# Patient Record
Sex: MALE | Race: OTHER | NOT HISPANIC OR LATINO | URBAN - METROPOLITAN AREA
[De-identification: names, ages, dates, MRNs, and addresses within clinical notes are randomized per-mention and may not be internally consistent; named-entity substitution may affect disease eponyms.]

---

## 2018-09-04 ENCOUNTER — INPATIENT (INPATIENT)
Facility: HOSPITAL | Age: 18
LOS: 2 days | Discharge: HOME | DRG: 872 | End: 2018-09-07
Attending: INTERNAL MEDICINE | Admitting: INTERNAL MEDICINE
Payer: COMMERCIAL

## 2018-09-04 VITALS
HEIGHT: 66 IN | SYSTOLIC BLOOD PRESSURE: 121 MMHG | TEMPERATURE: 103 F | HEART RATE: 152 BPM | OXYGEN SATURATION: 96 % | RESPIRATION RATE: 20 BRPM | DIASTOLIC BLOOD PRESSURE: 80 MMHG | WEIGHT: 199.96 LBS

## 2018-09-04 DIAGNOSIS — Z29.9 ENCOUNTER FOR PROPHYLACTIC MEASURES, UNSPECIFIED: ICD-10-CM

## 2018-09-04 DIAGNOSIS — A41.9 SEPSIS, UNSPECIFIED ORGANISM: ICD-10-CM

## 2018-09-04 DIAGNOSIS — K52.9 NONINFECTIVE GASTROENTERITIS AND COLITIS, UNSPECIFIED: ICD-10-CM

## 2018-09-04 LAB
24R-OH-CALCIDIOL SERPL-MCNC: 23.3 NG/ML — LOW (ref 30–80)
ALBUMIN SERPL ELPH-MCNC: 4.2 G/DL — SIGNIFICANT CHANGE UP (ref 3.5–5)
ALP SERPL-CCNC: 112 U/L — SIGNIFICANT CHANGE UP (ref 40–120)
ALT FLD-CCNC: 50 U/L DA — SIGNIFICANT CHANGE UP (ref 10–60)
ANION GAP SERPL CALC-SCNC: 7 MMOL/L — SIGNIFICANT CHANGE UP (ref 5–17)
ANION GAP SERPL CALC-SCNC: 9 MMOL/L — SIGNIFICANT CHANGE UP (ref 5–17)
APPEARANCE UR: CLEAR — SIGNIFICANT CHANGE UP
APTT BLD: 30 SEC — SIGNIFICANT CHANGE UP (ref 27.5–37.4)
AST SERPL-CCNC: 25 U/L — SIGNIFICANT CHANGE UP (ref 10–40)
BAKER'S YEAST IGA QN IA: 13.9 UNITS — SIGNIFICANT CHANGE UP
BAKER'S YEAST IGA QN IA: NEGATIVE — SIGNIFICANT CHANGE UP
BAKER'S YEAST IGG QN IA: 13.7 UNITS — SIGNIFICANT CHANGE UP
BAKER'S YEAST IGG QN IA: NEGATIVE — SIGNIFICANT CHANGE UP
BASOPHILS # BLD AUTO: 0 K/UL — SIGNIFICANT CHANGE UP (ref 0–0.2)
BASOPHILS # BLD AUTO: 0.1 K/UL — SIGNIFICANT CHANGE UP (ref 0–0.2)
BASOPHILS NFR BLD AUTO: 0.2 % — SIGNIFICANT CHANGE UP (ref 0–2)
BASOPHILS NFR BLD AUTO: 0.5 % — SIGNIFICANT CHANGE UP (ref 0–2)
BILIRUB SERPL-MCNC: 0.4 MG/DL — SIGNIFICANT CHANGE UP (ref 0.2–1.2)
BILIRUB UR-MCNC: NEGATIVE — SIGNIFICANT CHANGE UP
BUN SERPL-MCNC: 13 MG/DL — SIGNIFICANT CHANGE UP (ref 7–18)
BUN SERPL-MCNC: 14 MG/DL — SIGNIFICANT CHANGE UP (ref 7–18)
CALCIUM SERPL-MCNC: 8.5 MG/DL — SIGNIFICANT CHANGE UP (ref 8.4–10.5)
CALCIUM SERPL-MCNC: 8.9 MG/DL — SIGNIFICANT CHANGE UP (ref 8.4–10.5)
CHLORIDE SERPL-SCNC: 105 MMOL/L — SIGNIFICANT CHANGE UP (ref 96–108)
CHLORIDE SERPL-SCNC: 108 MMOL/L — SIGNIFICANT CHANGE UP (ref 96–108)
CHOLEST SERPL-MCNC: 159 MG/DL — SIGNIFICANT CHANGE UP (ref 10–199)
CO2 SERPL-SCNC: 25 MMOL/L — SIGNIFICANT CHANGE UP (ref 22–31)
CO2 SERPL-SCNC: 25 MMOL/L — SIGNIFICANT CHANGE UP (ref 22–31)
COLOR SPEC: YELLOW — SIGNIFICANT CHANGE UP
CREAT SERPL-MCNC: 1.1 MG/DL — SIGNIFICANT CHANGE UP (ref 0.5–1.3)
CREAT SERPL-MCNC: 1.11 MG/DL — SIGNIFICANT CHANGE UP (ref 0.5–1.3)
CRP SERPL-MCNC: 13.47 MG/DL — HIGH (ref 0–0.4)
DIFF PNL FLD: ABNORMAL
EOSINOPHIL # BLD AUTO: 0 K/UL — SIGNIFICANT CHANGE UP (ref 0–0.5)
EOSINOPHIL # BLD AUTO: 0 K/UL — SIGNIFICANT CHANGE UP (ref 0–0.5)
EOSINOPHIL NFR BLD AUTO: 0 % — SIGNIFICANT CHANGE UP (ref 0–6)
EOSINOPHIL NFR BLD AUTO: 0.1 % — SIGNIFICANT CHANGE UP (ref 0–6)
ERYTHROCYTE [SEDIMENTATION RATE] IN BLOOD: 13 MM/HR — SIGNIFICANT CHANGE UP (ref 0–15)
GLUCOSE SERPL-MCNC: 104 MG/DL — HIGH (ref 70–99)
GLUCOSE SERPL-MCNC: 99 MG/DL — SIGNIFICANT CHANGE UP (ref 70–99)
GLUCOSE UR QL: NEGATIVE — SIGNIFICANT CHANGE UP
HBA1C BLD-MCNC: 5.4 % — SIGNIFICANT CHANGE UP (ref 4–5.6)
HCT VFR BLD CALC: 42.4 % — SIGNIFICANT CHANGE UP (ref 39–50)
HCT VFR BLD CALC: 47.6 % — SIGNIFICANT CHANGE UP (ref 39–50)
HDLC SERPL-MCNC: 45 MG/DL — SIGNIFICANT CHANGE UP
HGB BLD-MCNC: 13.6 G/DL — SIGNIFICANT CHANGE UP (ref 13–17)
HGB BLD-MCNC: 15.2 G/DL — SIGNIFICANT CHANGE UP (ref 13–17)
INR BLD: 1.12 RATIO — SIGNIFICANT CHANGE UP (ref 0.88–1.16)
KETONES UR-MCNC: NEGATIVE — SIGNIFICANT CHANGE UP
LACTATE SERPL-SCNC: 1.1 MMOL/L — SIGNIFICANT CHANGE UP (ref 0.7–2)
LEUKOCYTE ESTERASE UR-ACNC: NEGATIVE — SIGNIFICANT CHANGE UP
LIPID PNL WITH DIRECT LDL SERPL: 106 MG/DL — SIGNIFICANT CHANGE UP
LYMPHOCYTES # BLD AUTO: 0.8 K/UL — LOW (ref 1–3.3)
LYMPHOCYTES # BLD AUTO: 1.1 K/UL — SIGNIFICANT CHANGE UP (ref 1–3.3)
LYMPHOCYTES # BLD AUTO: 4.9 % — LOW (ref 13–44)
LYMPHOCYTES # BLD AUTO: 6.4 % — LOW (ref 13–44)
MAGNESIUM SERPL-MCNC: 1.7 MG/DL — SIGNIFICANT CHANGE UP (ref 1.6–2.6)
MCHC RBC-ENTMCNC: 27.2 PG — SIGNIFICANT CHANGE UP (ref 27–34)
MCHC RBC-ENTMCNC: 27.6 PG — SIGNIFICANT CHANGE UP (ref 27–34)
MCHC RBC-ENTMCNC: 31.9 GM/DL — LOW (ref 32–36)
MCHC RBC-ENTMCNC: 32.2 GM/DL — SIGNIFICANT CHANGE UP (ref 32–36)
MCV RBC AUTO: 85.3 FL — SIGNIFICANT CHANGE UP (ref 80–100)
MCV RBC AUTO: 85.8 FL — SIGNIFICANT CHANGE UP (ref 80–100)
MONOCYTES # BLD AUTO: 0.8 K/UL — SIGNIFICANT CHANGE UP (ref 0–0.9)
MONOCYTES # BLD AUTO: 0.9 K/UL — SIGNIFICANT CHANGE UP (ref 0–0.9)
MONOCYTES NFR BLD AUTO: 4.5 % — SIGNIFICANT CHANGE UP (ref 2–14)
MONOCYTES NFR BLD AUTO: 5.2 % — SIGNIFICANT CHANGE UP (ref 2–14)
NEUTROPHILS # BLD AUTO: 14.8 K/UL — HIGH (ref 1.8–7.4)
NEUTROPHILS # BLD AUTO: 15.2 K/UL — HIGH (ref 1.8–7.4)
NEUTROPHILS NFR BLD AUTO: 87.8 % — HIGH (ref 43–77)
NEUTROPHILS NFR BLD AUTO: 90.3 % — HIGH (ref 43–77)
NITRITE UR-MCNC: NEGATIVE — SIGNIFICANT CHANGE UP
PH UR: 5 — SIGNIFICANT CHANGE UP (ref 5–8)
PHOSPHATE SERPL-MCNC: 2.3 MG/DL — LOW (ref 2.5–4.5)
PLATELET # BLD AUTO: 295 K/UL — SIGNIFICANT CHANGE UP (ref 150–400)
PLATELET # BLD AUTO: 347 K/UL — SIGNIFICANT CHANGE UP (ref 150–400)
POTASSIUM SERPL-MCNC: 3.5 MMOL/L — SIGNIFICANT CHANGE UP (ref 3.5–5.3)
POTASSIUM SERPL-MCNC: 3.7 MMOL/L — SIGNIFICANT CHANGE UP (ref 3.5–5.3)
POTASSIUM SERPL-SCNC: 3.5 MMOL/L — SIGNIFICANT CHANGE UP (ref 3.5–5.3)
POTASSIUM SERPL-SCNC: 3.7 MMOL/L — SIGNIFICANT CHANGE UP (ref 3.5–5.3)
PROT SERPL-MCNC: 9.1 G/DL — HIGH (ref 6–8.3)
PROT UR-MCNC: 30 MG/DL
PROTHROM AB SERPL-ACNC: 12.2 SEC — SIGNIFICANT CHANGE UP (ref 9.8–12.7)
RBC # BLD: 4.94 M/UL — SIGNIFICANT CHANGE UP (ref 4.2–5.8)
RBC # BLD: 5.58 M/UL — SIGNIFICANT CHANGE UP (ref 4.2–5.8)
RBC # FLD: 13.4 % — SIGNIFICANT CHANGE UP (ref 10.3–14.5)
RBC # FLD: 13.7 % — SIGNIFICANT CHANGE UP (ref 10.3–14.5)
SODIUM SERPL-SCNC: 139 MMOL/L — SIGNIFICANT CHANGE UP (ref 135–145)
SODIUM SERPL-SCNC: 140 MMOL/L — SIGNIFICANT CHANGE UP (ref 135–145)
SP GR SPEC: 1.01 — SIGNIFICANT CHANGE UP (ref 1.01–1.02)
TOTAL CHOLESTEROL/HDL RATIO MEASUREMENT: 3.5 RATIO — SIGNIFICANT CHANGE UP (ref 3.4–9.6)
TRIGL SERPL-MCNC: 42 MG/DL — SIGNIFICANT CHANGE UP (ref 10–149)
TSH SERPL-MCNC: 0.28 UU/ML — LOW (ref 0.34–4.82)
UROBILINOGEN FLD QL: NEGATIVE — SIGNIFICANT CHANGE UP
VIT B12 SERPL-MCNC: 643 PG/ML — SIGNIFICANT CHANGE UP (ref 232–1245)
WBC # BLD: 16.9 K/UL — HIGH (ref 3.8–10.5)
WBC # BLD: 16.9 K/UL — HIGH (ref 3.8–10.5)
WBC # FLD AUTO: 16.9 K/UL — HIGH (ref 3.8–10.5)
WBC # FLD AUTO: 16.9 K/UL — HIGH (ref 3.8–10.5)

## 2018-09-04 PROCEDURE — 71046 X-RAY EXAM CHEST 2 VIEWS: CPT | Mod: 26

## 2018-09-04 PROCEDURE — 74177 CT ABD & PELVIS W/CONTRAST: CPT | Mod: 26

## 2018-09-04 PROCEDURE — 99285 EMERGENCY DEPT VISIT HI MDM: CPT | Mod: 25

## 2018-09-04 RX ORDER — ACETAMINOPHEN 500 MG
1000 TABLET ORAL ONCE
Qty: 0 | Refills: 0 | Status: COMPLETED | OUTPATIENT
Start: 2018-09-04 | End: 2018-09-04

## 2018-09-04 RX ORDER — CIPROFLOXACIN LACTATE 400MG/40ML
400 VIAL (ML) INTRAVENOUS EVERY 12 HOURS
Qty: 0 | Refills: 0 | Status: DISCONTINUED | OUTPATIENT
Start: 2018-09-04 | End: 2018-09-07

## 2018-09-04 RX ORDER — METRONIDAZOLE 500 MG
500 TABLET ORAL EVERY 8 HOURS
Qty: 0 | Refills: 0 | Status: DISCONTINUED | OUTPATIENT
Start: 2018-09-04 | End: 2018-09-07

## 2018-09-04 RX ORDER — ACETAMINOPHEN 500 MG
650 TABLET ORAL EVERY 6 HOURS
Qty: 0 | Refills: 0 | Status: DISCONTINUED | OUTPATIENT
Start: 2018-09-04 | End: 2018-09-07

## 2018-09-04 RX ORDER — ONDANSETRON 8 MG/1
4 TABLET, FILM COATED ORAL ONCE
Qty: 0 | Refills: 0 | Status: COMPLETED | OUTPATIENT
Start: 2018-09-04 | End: 2018-09-04

## 2018-09-04 RX ORDER — SODIUM,POTASSIUM PHOSPHATES 278-250MG
1 POWDER IN PACKET (EA) ORAL
Qty: 0 | Refills: 0 | Status: COMPLETED | OUTPATIENT
Start: 2018-09-04 | End: 2018-09-04

## 2018-09-04 RX ORDER — SODIUM CHLORIDE 9 MG/ML
1000 INJECTION, SOLUTION INTRAVENOUS
Qty: 0 | Refills: 0 | Status: COMPLETED | OUTPATIENT
Start: 2018-09-04 | End: 2018-09-05

## 2018-09-04 RX ORDER — KETOROLAC TROMETHAMINE 30 MG/ML
15 SYRINGE (ML) INJECTION EVERY 6 HOURS
Qty: 0 | Refills: 0 | Status: DISCONTINUED | OUTPATIENT
Start: 2018-09-04 | End: 2018-09-07

## 2018-09-04 RX ORDER — SODIUM CHLORIDE 9 MG/ML
2700 INJECTION INTRAMUSCULAR; INTRAVENOUS; SUBCUTANEOUS ONCE
Qty: 0 | Refills: 0 | Status: COMPLETED | OUTPATIENT
Start: 2018-09-04 | End: 2018-09-04

## 2018-09-04 RX ORDER — TRAMADOL HYDROCHLORIDE 50 MG/1
25 TABLET ORAL EVERY 6 HOURS
Qty: 0 | Refills: 0 | Status: DISCONTINUED | OUTPATIENT
Start: 2018-09-04 | End: 2018-09-07

## 2018-09-04 RX ORDER — ONDANSETRON 8 MG/1
4 TABLET, FILM COATED ORAL EVERY 6 HOURS
Qty: 0 | Refills: 0 | Status: DISCONTINUED | OUTPATIENT
Start: 2018-09-04 | End: 2018-09-07

## 2018-09-04 RX ORDER — KETOROLAC TROMETHAMINE 30 MG/ML
30 SYRINGE (ML) INJECTION ONCE
Qty: 0 | Refills: 0 | Status: DISCONTINUED | OUTPATIENT
Start: 2018-09-04 | End: 2018-09-04

## 2018-09-04 RX ORDER — PIPERACILLIN AND TAZOBACTAM 4; .5 G/20ML; G/20ML
3.38 INJECTION, POWDER, LYOPHILIZED, FOR SOLUTION INTRAVENOUS ONCE
Qty: 0 | Refills: 0 | Status: COMPLETED | OUTPATIENT
Start: 2018-09-04 | End: 2018-09-04

## 2018-09-04 RX ADMIN — SODIUM CHLORIDE 100 MILLILITER(S): 9 INJECTION, SOLUTION INTRAVENOUS at 13:00

## 2018-09-04 RX ADMIN — Medication 100 MILLIGRAM(S): at 13:00

## 2018-09-04 RX ADMIN — Medication 100 MILLIGRAM(S): at 06:37

## 2018-09-04 RX ADMIN — Medication 100 MILLIGRAM(S): at 21:32

## 2018-09-04 RX ADMIN — Medication 1 TABLET(S): at 18:19

## 2018-09-04 RX ADMIN — SODIUM CHLORIDE 2700 MILLILITER(S): 9 INJECTION INTRAMUSCULAR; INTRAVENOUS; SUBCUTANEOUS at 03:09

## 2018-09-04 RX ADMIN — Medication 400 MILLIGRAM(S): at 03:09

## 2018-09-04 RX ADMIN — SODIUM CHLORIDE 100 MILLILITER(S): 9 INJECTION, SOLUTION INTRAVENOUS at 18:21

## 2018-09-04 RX ADMIN — Medication 1000 MILLIGRAM(S): at 04:00

## 2018-09-04 RX ADMIN — Medication 30 MILLIGRAM(S): at 03:10

## 2018-09-04 RX ADMIN — Medication 200 MILLIGRAM(S): at 18:19

## 2018-09-04 RX ADMIN — PIPERACILLIN AND TAZOBACTAM 200 GRAM(S): 4; .5 INJECTION, POWDER, LYOPHILIZED, FOR SOLUTION INTRAVENOUS at 03:14

## 2018-09-04 RX ADMIN — ONDANSETRON 4 MILLIGRAM(S): 8 TABLET, FILM COATED ORAL at 03:10

## 2018-09-04 RX ADMIN — Medication 650 MILLIGRAM(S): at 18:21

## 2018-09-04 RX ADMIN — PIPERACILLIN AND TAZOBACTAM 3.38 GRAM(S): 4; .5 INJECTION, POWDER, LYOPHILIZED, FOR SOLUTION INTRAVENOUS at 04:00

## 2018-09-04 RX ADMIN — Medication 200 MILLIGRAM(S): at 06:37

## 2018-09-04 RX ADMIN — SODIUM CHLORIDE 2700 MILLILITER(S): 9 INJECTION INTRAMUSCULAR; INTRAVENOUS; SUBCUTANEOUS at 04:22

## 2018-09-04 RX ADMIN — Medication 650 MILLIGRAM(S): at 13:00

## 2018-09-04 RX ADMIN — Medication 30 MILLIGRAM(S): at 04:22

## 2018-09-04 RX ADMIN — Medication 1 TABLET(S): at 13:00

## 2018-09-04 RX ADMIN — SODIUM CHLORIDE 100 MILLILITER(S): 9 INJECTION, SOLUTION INTRAVENOUS at 21:32

## 2018-09-04 NOTE — H&P ADULT - PROBLEM SELECTOR PLAN 1
Patient comes in with fever with abdominal pain   High grade fever and tachycardia in Ed  Code Sepsis was called in Ed, patient was given one dose of Zosyn , 2700ml fluid , after which temp and Hr normalized  ; Lactate wnl   Leucocytosis noted on labs  will start with Cipro and Flagyl for now   NPO  IVF   Pain mx   f/u blood cultures   Monitor for fever , signs of hemodynamic stability Patient comes in with fever with abdominal pain   High grade fever and tachycardia in Ed  Code Sepsis was called in Ed, patient was given one dose of Zosyn , 2700ml fluid , after which temp and Hr normalized  ; Lactate wnl   Leucocytosis noted on labs  will start with Cipro and Flagyl for now   NPO  IVF   Pain mx   f/u blood cultures   Monitor for fever , signs of hemodynamic stability  Could be bacterial vs viral   however with h/o Ulcerative Colitis, would send Calprotectin, asca , anca Patient comes in with fever with abdominal pain   High grade fever and tachycardia in Ed  Code Sepsis was called in Ed, patient was given one dose of Zosyn , 2700ml fluid , after which temp and Hr normalized  ; Lactate wnl   Leucocytosis noted on labs  will start with Cipro and Flagyl for now   NPO  IVF   Pain mx   Zofran prn for nausea   f/u blood cultures   Monitor for fever , signs of hemodynamic stability  Could be bacterial vs viral   Stool ova and parasites   however with h/o Ulcerative Colitis, would send Calprotectin, asca , anca , esr , crp

## 2018-09-04 NOTE — ED PROVIDER NOTE - PROGRESS NOTE DETAILS
labs show wbc 17K, cmp unremarkable, awaiting UA  CT shows ascending/transverse colon colitis  discussed above with patient and family, pt stable for admission

## 2018-09-04 NOTE — H&P ADULT - FAMILY HISTORY
No pertinent family history in first degree relatives Sibling  Still living? Unknown  Family history of ulcerative colitis, Age at diagnosis: Age Unknown

## 2018-09-04 NOTE — ED PROVIDER NOTE - MUSCULOSKELETAL, MLM
Spine appears normal, range of motion is not limited, mild tenderness over right upper quadrant and epigastrium.

## 2018-09-04 NOTE — H&P ADULT - ATTENDING COMMENTS
Seen and examined  with sister in room . I feel much better so want to eat . Agree with above A/P . Ct abdomen noted. IV abxs,IVF and will start clears.

## 2018-09-04 NOTE — H&P ADULT - NSHPPHYSICALEXAM_GEN_ALL_CORE
Constitutional : Well-developed  Eyes : EOMI; PERRL; no drainage or redness ; dry mucous membranes   Neck: No bruits; no thyromegaly or nodules  Breasts : No masses; no nipple discharge  Back : No deformity or limitation of movement  Respiratory : Breath Sounds equal & clear to percussion & auscultation, no accessory muscle use  Cardiovascular :Regular rate & rhythm, normal S1, S2; no murmurs, gallops or rubs;  Gastrointestinal : Soft, tenderness + diffusely , no rebound tenderness or guarding , no hepatosplenomegaly, normal bowel sounds  Extremities : No cyanosis, clubbing or edema  Vascular	: Equal and normal pulses (carotid, femoral, dorsalis pedis)  Neurological : Alert & oriented; no sensory, motor or coordination deficits, normal reflexes  Skin : No lesions; no rash  Musculoskeletal : No joint pain, swelling or deformity; no limitation of movement  Psychiatric : Affect and characteristics of appearance, verbalizations, behaviors are appropriate Constitutional : obese   Eyes : EOMI; PERRL; no drainage or redness ; dry mucous membranes   Neck: No bruits; no thyromegaly or nodules  Breasts : No masses; no nipple discharge  Back : No deformity or limitation of movement  Respiratory : Breath Sounds equal & clear to percussion & auscultation, no accessory muscle use  Cardiovascular :Regular rate & rhythm, normal S1, S2; no murmurs, gallops or rubs;  Gastrointestinal : Soft, no tenderness , said had tenderness before , no rebound tenderness or guarding , no hepatosplenomegaly, normal bowel sounds  Extremities : No cyanosis, clubbing or edema  Vascular	: Equal and normal pulses (carotid, femoral, dorsalis pedis)  Neurological : Alert & oriented; no sensory, motor or coordination deficits, normal reflexes  Skin : No lesions; no rash  Musculoskeletal : No joint pain, swelling or deformity; no limitation of movement  Psychiatric : Affect and characteristics of appearance, verbalizations, behaviors are appropriate

## 2018-09-04 NOTE — ED PROVIDER NOTE - MEDICAL DECISION MAKING DETAILS
17 y/o M pt presenting with fever, abd pain, and diarrhea. Will obtain sepsis, labs, UA, CT abd, give IV fluids, IV abx, Tylenol and Toradol for fever. Will reassess. 17 y/o M pt presenting with fever, abd pain, and diarrhea. Will obtain sepsis, labs, UA, CT, give IV fluids, IV abx, Tylenol and Toradol for fever. Will reassess.

## 2018-09-04 NOTE — H&P ADULT - NSHPLABSRESULTS_GEN_ALL_CORE
15.2   16.9  )-----------( 347      ( 04 Sep 2018 03:10 )             47.6       09-04    139  |  105  |  14  ----------------------------<  104<H>  3.5   |  25  |  1.11    Ca    8.9      04 Sep 2018 03:10    TPro  9.1<H>  /  Alb  4.2  /  TBili  0.4  /  DBili  x   /  AST  25  /  ALT  50  /  AlkPhos  112  09-04      < from: CT Abdomen and Pelvis w/ IV Cont (09.04.18 @ 03:52) >    FINDINGS:    LOWER CHEST: Within normal limits.    LIVER: Steatosis.  BILE DUCTS: Normal caliber.  GALLBLADDER: Within normal limits.  SPLEEN: Within normal limits.  PANCREAS: Within normal limits.  ADRENALS: Within normal limits.  KIDNEYS/URETERS: Within normal limits.    BLADDER: Within normal limits.  REPRODUCTIVE ORGANS: The prostate is within normal limits.    BOWEL: Colonic bowel wall thickening involving the ascending and   transverse colon with associated mild pericolonic inflammatory change. No   bowel obstruction. Appendix normal.  PERITONEUM: No ascites. Few mildly prominent right lower quadrant lymph   nodes, likely reactive.   VESSELS:  Within normal limits.  RETROPERITONEUM: No lymphadenopathy.    ABDOMINAL WALL: Small fat-containing left inguinal hernia..  BONES: Within normal limits.    IMPRESSION:    Colitis of the ascending and transverse colon.    < end of copied text >

## 2018-09-04 NOTE — H&P ADULT - ASSESSMENT
Will admit to medicine for Sepsis secondary to Colitis. 17 yo male with no PMH or PSH ,comes in with abdominal pain and fever at home. Patient complaint of high grade fever at home , with some chills. Also experienced generalized abdominal pain , 4/10 in intensity , with no radiation to back. Patient also reported nausea and 4 episodes of NBNP vomiting. Last bm was yest night , formed stools , no diarrhea , no blood in stools. No constipation. Denies any sick contacts / hospitalizations. Denies any h/o similar episode.   Negative for all other ROS. Had outside food ( chinese ) , but on Saturday , nobody else ate same food. No recent travel.   Family history : sister has Ulcerative Colitis     In Ed , BP : 121/ 80 mm hg , Hr : 152 , Temp : 102.8 F  Code Sepsis was called in Ed, patient was given one dose of Zosyn , 2700ml fluid , after which temp and Hr normalized   cbc shows white count of 16 with left shift   bmp wnl , lactate wnl   CT A/P shows colitis of Ascending colon and Transverse Colon     Will admit to medicine for Sepsis secondary to Colitis.

## 2018-09-04 NOTE — ED ADULT TRIAGE NOTE - CHIEF COMPLAINT QUOTE
pt stated he has a fever that started yesterday with stomach pain with nausea and vomiting denies diarrhea

## 2018-09-04 NOTE — H&P ADULT - HISTORY OF PRESENT ILLNESS
17 yo male with no PMH or PSH ,comes in with abdominal pain and fever at home.         In Ed , BP : 121/ 80 mm hg , Hr : 152 , Temp : 102.8 F  Code Sepsis was called in Ed, patient was given one dose of Zosyn , 2700ml fluid , after which temp and Hr normalized   cbc shows white count of 16 with left shift   bmp wnl , lactate wnl   CT A/P shows colitis of Ascending colon and Transverse Colon 17 yo male with no PMH or PSH ,comes in with abdominal pain and fever at home. Patient complaint of high grade fever at home , with some chills. Also experienced generalized abdominal pain , 4/10 in intensity , with no radiation to back. Patient also reported nausea and 4 episodes of NBNP vomiting. Last bm was yest night , formed stools , no diarrhea , no blood in stools. No constipation. Denies any sick contacts / hospitalizations. Denies any h/o similar episode.   Negative for all other ROS. Had outside food ( chinese ) , but on Saturday , nobody else ate same food. No recent travel.   Family history : sister has Ulcerative Colitis     In Ed , BP : 121/ 80 mm hg , Hr : 152 , Temp : 102.8 F  Code Sepsis was called in Ed, patient was given one dose of Zosyn , 2700ml fluid , after which temp and Hr normalized   cbc shows white count of 16 with left shift   bmp wnl , lactate wnl   CT A/P shows colitis of Ascending colon and Transverse Colon

## 2018-09-04 NOTE — ED PROVIDER NOTE - OBJECTIVE STATEMENT
19 y/o M pt with no PMHx presents to the ED c/o fever and abd pain on the middle of his abd since yesterday. Patient reports he did not take his temperature. Patient states he took ibuprofen at 11pm and had 3 episodes of vomiting. Patient denies diarrhea or any other complaints. NKDA.

## 2018-09-04 NOTE — PATIENT PROFILE ADULT. - CAREGIVER
Patient Education     Bronchitis with Wheezing (Viral or Bacterial: Adult)    Bronchitis is an infection of the air passages. It often occurs during the common cold and is usually caused by a virus. Symptoms include cough with mucus (phlegm) and low-grade fever. This illness is contagious during the first few days and is spread through the air by coughing and sneezing, or by direct contact (touching the sick person and then touching your own eyes, nose, or mouth).  If there is a lot of inflammation, air flow is restricted. The air passages may also go into spasm, especially if you have asthma. This causes wheezing and difficulty breathing even in people who do not have asthma.  Bronchitis usually lasts 7 to 14 days. The wheezing should improve with treatment during the first week. An inhaler is often prescribed to relax the air passages and stop wheezing. Antibiotics will be prescribed if your doctor thinks there is also a secondary bacterial infection.  Home care  · If symptoms are severe, rest at home for the first 2 to 3 days. When you go back to your usual activities, don't let yourself get too tired.  · Do not smoke. Also avoid being exposed to secondhand smoke.  · You may use over-the-counter medicine to control fever or pain, unless another medicine was prescribed. Note: If you have chronic liver or kidney disease or have ever had a stomach ulcer or gastrointestinal bleeding, talk with your healthcare provider before using these medicines. Also talk to your provider if you are taking medicine to prevent blood clots.) Aspirin should never be given to anyone younger than 18 years of age who is ill with a viral infection or fever. It may cause severe liver or brain damage.  · Your appetite may be poor, so a light diet is fine. Avoid dehydration by drinking 6 to 8 glasses of fluids per day (such as water, soft drinks, sports drinks, juices, tea, or soup). Extra fluids will help loosen secretions in the nose  and lungs.  · Over-the-counter cough, cold, and sore-throat medicines will not shorten the length of the illness, but they may be helpful to reduce symptoms. (Note: Do not use decongestants if you have high blood pressure.)  · If you were given an inhaler, use it exactly as directed. If you need to use it more often than prescribed, your condition may be worsening. If this happens, contact your healthcare provider.  · If prescribed, finish all antibiotic medicine, even if you are feeling better after only a few days.  Follow-up care  Follow up with your healthcare provider, or as advised. If you had an X-ray or ECG (electrocardiogram), a specialist will review it. You will be notified of any new findings that may affect your care.  Note: If you are age 65 or older, or if you have a chronic lung disease or condition that affects your immune system, or you smoke, talk to your healthcare provider about having a pneumococcal vaccinations and a yearly influenza vaccination (flu shot).  When to seek medical advice   Call your healthcare provider right away if any of these occur:  · Fever of 100.4°F (38°C) or higher  · Coughing up increasing amounts of colored sputum  · Weakness, drowsiness, headache, facial pain, ear pain, or a stiff neck  Call 911, or get immediate medical care  Contact emergency services right away if any of these occur.  · Coughing up blood  · Worsening weakness, drowsiness, headache, or stiff neck  · Increased wheezing not helped with medication, shortness of breath, or pain with breathing  © 9146-2853 [employer]. [address] [place] [address], [place] [address]. All rights reserved. This information is not intended as a substitute for professional medical care. Always follow your healthcare professional's instructions.             FOR TREATMENT OF RESPIRATORY INFECTIONS (UPPER RESPIRATORY INFECTIONS OR COMMMON COLD, SINUSITIS, BRONCHITIS, ETC.:    1. To help thin out the mucous and dry it up  some, use guaifenesin, an expectorant, brand names are Mucinex or Robitussin.  It also is used to lessen a cough by making the phlegm thinner, but does not suppress it. The maximum dose is 1200 mg 2 times daily.  2.  To help suppress the cough, use dextromethorphan which comes by itself, for example a brand name is Delsym, or comes in combination with guaifenesin, for example, brand names are Mucinex DM or Robitussin-DM.  The maximum dose is 60 mg twice a day. Also, studies have shown that honey is clinically effective as a cough suppressant.  3. To help decrease congestion and swelling in the nose/sinus openings and loosen mucus in the nose/sinuses, studies have shown that using saline irrigation or a saline nasal spray, a brand name is Ayr Nasal Spray, is very helpful. I usually recommend to use 2 sprays each nostril at least 4 times daily, or as often as needed. Using the saline nasal spray also helps for prevention of nosebleeds if use regularly. In addition, at nighttime, I recommend using an ointment to each nostril, like vitamin A and D or Vaseline or even saline ointment applying to each nostril.  4. Also, to help decrease congestion and swelling in the nose/sinus openings, studies have shown that a steroid nasal spray is also effective. They are available by prescription or over-the-counter. A common one is fluticasone, brand name is Flonase, and usual dosing is 2 sprays to each nostril once daily.  5. To help with congestion/swelling alone in the nose/sinus openings and the eustachian tube which drains from the middle ear, use a nasal decongestant, oxymetazoline, brand names are Afrin or Neosynephrine, usually dosed at 2 sprays in each nostril 2 times daily.  You need to be careful to use it not longer than 4-5 days because of the risk of tolerance and developing chronic nasal congestion unless you continue to use it.   6. To help with congestion/swelling in the nose, sinus openings and chest, use  pseudoephedrine, a brand name is Sudafed, which is dosed based on the number of milligrams in the pill/liquid:  Immediate release: 60 mg every 4-6 hours; Extended release: 120 mg every 12 hours or 240 mg every 24 hours; maximum: 240 mg/24 hours.  7. For pain/fever, use acetaminophen, a brand name is Tylenol, maximum dosing is 1000 mg 4 times a day, or any of the over-the-counter NSAIDs for example ibuprofen,a brand name is Advil, usually comes in 200 mg pills and use a maximum of 12 pills per day  usually taking 4 pills 3 times daily or naproxen sodium, a brand name is Aleve, usually comes in 220 mg pills and use a maximum of 2 pills up to 2 times per day. Take the ibuprofen or naproxen sodium with food, or a large glass of juice or milk.  8. Antibiotics are generally not necessary early on in the course of a respiratory infection, but if symptoms worsen or a person is at increased risk of a bacterial infection secondary to other health problems, then antibiotics can be prescribed.  9. Drink plenty of fluids as this also helps to thin out the mucous.  10.  Use a cool mist vaporizer or humidifier at the bedside at night to increase humidity and help loosen mucus/phlegm and prevent dry throat irritation causing increased coughing.  11.  For wheezing or bronchospasm, you have been prescribed and an albuterol inhaler. I recommend that for the first 4 days take 1 or 2 puffs 4 times a day regularly and you may increase up to every 4 hours as needed if you wake up during the night and after those first 4 days, you may be able to use it less often and only use it when needed, depending on how severe the wheezing/bronchospasm/coughing is. If you take 2 puffs at a time, try to wait 2-3 minutes after the 1st puff before you take the 2nd puff.  After using an inhaler, rinse your mouth out with any liquid and then swallow and then drink more liquid to rinse any residual medication in your mouth and throat.   12. Follow up with  your primary care physician next week Thursday or Friday. If you get worse then go to the emergency room   Yes

## 2018-09-04 NOTE — ED PROVIDER NOTE - NS ED MD DISPO ADMITTING SERVICE
- Patient reported to have 3 episodes of whole-body shaking followed by unresponsiveness over the past 2 days lasting 20-25 seconds- 2 witnessed by his uncle and 1 that patient reports feeling himself  - pending video EEG  - neuro recs appreciated  - possible non psychogenic seizure - Patient reported to have 3 episodes of whole-body shaking followed by unresponsiveness prior to hospitalization  - pending video EEG  - neuro recs appreciated  - possible non psychogenic seizure [facility]

## 2018-09-05 DIAGNOSIS — K52.9 NONINFECTIVE GASTROENTERITIS AND COLITIS, UNSPECIFIED: ICD-10-CM

## 2018-09-05 LAB
ANION GAP SERPL CALC-SCNC: 10 MMOL/L — SIGNIFICANT CHANGE UP (ref 5–17)
AUTO DIFF PNL BLD: NEGATIVE — SIGNIFICANT CHANGE UP
BUN SERPL-MCNC: 9 MG/DL — SIGNIFICANT CHANGE UP (ref 7–18)
C-ANCA SER-ACNC: NEGATIVE — SIGNIFICANT CHANGE UP
CALCIUM SERPL-MCNC: 8.3 MG/DL — LOW (ref 8.4–10.5)
CHLORIDE SERPL-SCNC: 105 MMOL/L — SIGNIFICANT CHANGE UP (ref 96–108)
CO2 SERPL-SCNC: 23 MMOL/L — SIGNIFICANT CHANGE UP (ref 22–31)
CREAT SERPL-MCNC: 1.12 MG/DL — SIGNIFICANT CHANGE UP (ref 0.5–1.3)
CULTURE RESULTS: NO GROWTH — SIGNIFICANT CHANGE UP
GLUCOSE SERPL-MCNC: 103 MG/DL — HIGH (ref 70–99)
HCT VFR BLD CALC: 43.3 % — SIGNIFICANT CHANGE UP (ref 39–50)
HGB BLD-MCNC: 14.1 G/DL — SIGNIFICANT CHANGE UP (ref 13–17)
MCHC RBC-ENTMCNC: 28 PG — SIGNIFICANT CHANGE UP (ref 27–34)
MCHC RBC-ENTMCNC: 32.6 GM/DL — SIGNIFICANT CHANGE UP (ref 32–36)
MCV RBC AUTO: 86.1 FL — SIGNIFICANT CHANGE UP (ref 80–100)
P-ANCA SER-ACNC: NEGATIVE — SIGNIFICANT CHANGE UP
PHOSPHATE SERPL-MCNC: 2.6 MG/DL — SIGNIFICANT CHANGE UP (ref 2.5–4.5)
PLATELET # BLD AUTO: 260 K/UL — SIGNIFICANT CHANGE UP (ref 150–400)
POTASSIUM SERPL-MCNC: 3.7 MMOL/L — SIGNIFICANT CHANGE UP (ref 3.5–5.3)
POTASSIUM SERPL-SCNC: 3.7 MMOL/L — SIGNIFICANT CHANGE UP (ref 3.5–5.3)
RBC # BLD: 5.03 M/UL — SIGNIFICANT CHANGE UP (ref 4.2–5.8)
RBC # FLD: 13.3 % — SIGNIFICANT CHANGE UP (ref 10.3–14.5)
SODIUM SERPL-SCNC: 138 MMOL/L — SIGNIFICANT CHANGE UP (ref 135–145)
SPECIMEN SOURCE: SIGNIFICANT CHANGE UP
T3FREE SERPL-MCNC: 1.58 PG/ML — LOW (ref 1.8–4.6)
T4 FREE SERPL-MCNC: 1.1 NG/DL — SIGNIFICANT CHANGE UP (ref 0.9–1.8)
WBC # BLD: 15.7 K/UL — HIGH (ref 3.8–10.5)
WBC # FLD AUTO: 15.7 K/UL — HIGH (ref 3.8–10.5)

## 2018-09-05 PROCEDURE — 99254 IP/OBS CNSLTJ NEW/EST MOD 60: CPT

## 2018-09-05 RX ORDER — ERGOCALCIFEROL 1.25 MG/1
50000 CAPSULE ORAL
Qty: 0 | Refills: 0 | Status: DISCONTINUED | OUTPATIENT
Start: 2018-09-05 | End: 2018-09-07

## 2018-09-05 RX ORDER — ACETAMINOPHEN 500 MG
650 TABLET ORAL ONCE
Qty: 0 | Refills: 0 | Status: DISCONTINUED | OUTPATIENT
Start: 2018-09-05 | End: 2018-09-07

## 2018-09-05 RX ADMIN — Medication 650 MILLIGRAM(S): at 00:34

## 2018-09-05 RX ADMIN — SODIUM CHLORIDE 100 MILLILITER(S): 9 INJECTION, SOLUTION INTRAVENOUS at 06:24

## 2018-09-05 RX ADMIN — Medication 100 MILLIGRAM(S): at 14:50

## 2018-09-05 RX ADMIN — ERGOCALCIFEROL 50000 UNIT(S): 1.25 CAPSULE ORAL at 14:50

## 2018-09-05 RX ADMIN — Medication 200 MILLIGRAM(S): at 17:30

## 2018-09-05 RX ADMIN — Medication 100 MILLIGRAM(S): at 06:24

## 2018-09-05 RX ADMIN — Medication 200 MILLIGRAM(S): at 06:24

## 2018-09-05 RX ADMIN — Medication 100 MILLIGRAM(S): at 21:17

## 2018-09-05 NOTE — PROGRESS NOTE ADULT - SUBJECTIVE AND OBJECTIVE BOX
INTERVAL HPI/OVERNIGHT EVENTS: I feel better and had two loose BM today.   Vital Signs Last 24 Hrs  T(C): 37.3 (05 Sep 2018 08:14), Max: 39.3 (04 Sep 2018 23:55)  T(F): 99.1 (05 Sep 2018 08:14), Max: 102.8 (04 Sep 2018 23:55)  HR: 105 (05 Sep 2018 08:14) (105 - 131)  BP: 132/68 (05 Sep 2018 08:14) (109/54 - 132/68)  BP(mean): --  RR: 16 (05 Sep 2018 08:14) (16 - 18)  SpO2: 97% (05 Sep 2018 08:14) (97% - 99%)  I&O's Summary    MEDICATIONS  (STANDING):  ciprofloxacin   IVPB 400 milliGRAM(s) IV Intermittent every 12 hours  ergocalciferol 10033 Unit(s) Oral <User Schedule>  metroNIDAZOLE  IVPB 500 milliGRAM(s) IV Intermittent every 8 hours    MEDICATIONS  (PRN):  acetaminophen   Tablet 650 milliGRAM(s) Oral every 6 hours PRN For Temp greater than 38 C (100.4 F)  acetaminophen   Tablet .. 650 milliGRAM(s) Oral once PRN Temp greater or equal to 38.5C (101.3F)  acetaminophen   Tablet. 650 milliGRAM(s) Oral every 6 hours PRN Mild Pain (1 - 3)  ketorolac   Injectable 15 milliGRAM(s) IV Push every 6 hours PRN Severe Pain (7 - 10)  ondansetron Injectable 4 milliGRAM(s) IV Push every 6 hours PRN Nausea and/or Vomiting  traMADol 25 milliGRAM(s) Oral every 6 hours PRN Moderate Pain (4 - 6)    LABS:                        14.1   15.7  )-----------( 260      ( 05 Sep 2018 07:02 )             43.3     09-05    138  |  105  |  9   ----------------------------<  103<H>  3.7   |  23  |  1.12    Ca    8.3<L>      05 Sep 2018 07:02  Phos  2.6     09-05  Mg     1.7     09-04    TPro  9.1<H>  /  Alb  4.2  /  TBili  0.4  /  DBili  x   /  AST  25  /  ALT  50  /  AlkPhos  112  09-04    PT/INR - ( 04 Sep 2018 03:10 )   PT: 12.2 sec;   INR: 1.12 ratio         PTT - ( 04 Sep 2018 03:10 )  PTT:30.0 sec  Urinalysis Basic - ( 04 Sep 2018 05:45 )    Color: Yellow / Appearance: Clear / S.015 / pH: x  Gluc: x / Ketone: Negative  / Bili: Negative / Urobili: Negative   Blood: x / Protein: 30 mg/dL / Nitrite: Negative   Leuk Esterase: Negative / RBC: 2-5 /HPF / WBC 0-2 /HPF   Sq Epi: x / Non Sq Epi: Occasional /HPF / Bacteria: x      CAPILLARY BLOOD GLUCOSE            Urinalysis Basic - ( 04 Sep 2018 05:45 )    Color: Yellow / Appearance: Clear / S.015 / pH: x  Gluc: x / Ketone: Negative  / Bili: Negative / Urobili: Negative   Blood: x / Protein: 30 mg/dL / Nitrite: Negative   Leuk Esterase: Negative / RBC: 2-5 /HPF / WBC 0-2 /HPF   Sq Epi: x / Non Sq Epi: Occasional /HPF / Bacteria: x      REVIEW OF SYSTEMS:  CONSTITUTIONAL: No fever, weight loss, or fatigue  EYES: No eye pain, visual disturbances, or discharge  ENMT:  No difficulty hearing, tinnitus, vertigo; No sinus or throat pain  NECK: No pain or stiffness  RESPIRATORY: No cough, wheezing, chills or hemoptysis; No shortness of breath  CARDIOVASCULAR: No chest pain, palpitations, dizziness, or leg swelling  GASTROINTESTINAL: No abdominal or epigastric pain. No nausea, vomiting, or hematemesis; No diarrhea or constipation. No melena or hematochezia.  GENITOURINARY: No dysuria, frequency, hematuria, or incontinence  NEUROLOGICAL: No headaches, memory loss, loss of strength, numbness, or tremors      Consultant(s) Notes Reviewed:  [x ] YES  [ ] NO    PHYSICAL EXAM:  GENERAL: NAD, well-groomed, well-developed,not in any distress ,  HEAD:  Atraumatic, Normocephalic  EYES: EOMI, PERRLA, conjunctiva and sclera clear  ENMT: No tonsillar erythema, exudates, or enlargement; Moist mucous membranes, Good dentition, No lesions  NECK: Supple, No JVD, Normal thyroid  NERVOUS SYSTEM:  Alert & Oriented X3, No focal deficit   CHEST/LUNG: Good air entry bilateral with no  rales, rhonchi, wheezing, or rubs  HEART: Regular rate and rhythm; No murmurs, rubs, or gallops  ABDOMEN: Soft, Nontender, Nondistended; Bowel sounds present  EXTREMITIES:  2+ Peripheral Pulses, No clubbing, cyanosis, or edema  SKIN: No rashes or lesions    Care Discussed with Consultants/Other Providers [ x] YES  [ ] NO

## 2018-09-05 NOTE — PROGRESS NOTE ADULT - PROBLEM SELECTOR PLAN 2
IMPROVE VTE Individual Risk Assessment          RISK                                                          Points  [  ] Previous VTE                                                3  [  ] Thrombophilia                                             2  [  ] Lower limb paralysis                                   2        (unable to hold up >15 seconds)    [  ] Current Cancer                                             2         (within 6 months)  [  ] Immobilization > 24 hrs                              1  [  ] ICU/CCU stay > 24 hours                             1  [  ] Age > 60                                                         1    IMPROVE VTE Score: 0   no indication for dvt ppx

## 2018-09-05 NOTE — CONSULT NOTE ADULT - ASSESSMENT
18 year old male with acute colitis. Although this might be UC the acuity of his symptoms lean away from a chronic process like UC. He is responding well to antibiotics ASCA is negative. ANCA is pending. Stool cultures and Fecal calprotectin is pending.   Plan:  Advance to Low fiber diet   Follow up all stool cultures and Ova and parasite (please send three specimens).   Outpatient colonoscopy   Avoid opoid for pain   Continue antibiotics   DVT prophylaxis

## 2018-09-05 NOTE — PROGRESS NOTE ADULT - ASSESSMENT
19 yo male with no PMH or PSH ,comes in with abdominal pain and fever at home. Patient complaint of high grade fever at home , with some chills. Also experienced generalized abdominal pain , 4/10 in intensity , with no radiation to back. Patient also reported nausea and 4 episodes of NBNP vomiting. Last bm was yest night , formed stools , no diarrhea , no blood in stools. No constipation. Denies any sick contacts / hospitalizations. Denies any h/o similar episode.   Negative for all other ROS. Had outside food ( chinese ) , but on Saturday , nobody else ate same food. No recent travel.   Family history : sister has Ulcerative Colitis     In Ed , BP : 121/ 80 mm hg , Hr : 152 , Temp : 102.8 F  Code Sepsis was called in Ed, patient was given one dose of Zosyn , 2700ml fluid , after which temp and Hr normalized   cbc shows white count of 16 with left shift   bmp wnl , lactate wnl   CT A/P shows colitis of Ascending colon and Transverse Colon     Will admit to medicine for Sepsis secondary to Colitis.

## 2018-09-05 NOTE — PROGRESS NOTE ADULT - ASSESSMENT
19 yo male with no PMH or PSH ,comes in with abdominal pain and fever at home. Patient complaint of high grade fever at home , with some chills. Also experienced generalized abdominal pain , 4/10 in intensity , with no radiation to back. Patient also reported nausea and 4 episodes of NBNP vomiting. Last bm was yest night , formed stools , no diarrhea , no blood in stools. No constipation. Denies any sick contacts / hospitalizations. Denies any h/o similar episode.      Problem/Plan - 1:  ·  Problem: Sepsis.  Plan: Present on admission. Improving. IV Abxs and S/P IVF.      Problem/Plan - 2:  ·  Problem: Acute colitis .  Plan: On Abxs. GI consult noted . Work up in progress .      Problem/Plan - 2:  ·  Problem: Prophylactic measure.  Plan: IMPROVE VTE Individual Risk Assessment; Ambulating .

## 2018-09-05 NOTE — CONSULT NOTE ADULT - SUBJECTIVE AND OBJECTIVE BOX
Patient is a 18y old  Male who presents with a chief complaint of fever, abdominal pain (05 Sep 2018 07:43)        REVIEW OF SYSTEMS  Constitutional:   No fever, no fatigue, no pallor, no night sweats, no weight loss.  HEENT:   No eye pain, no vision changes, no icterus, no mouth ulcers.  Respiratory:   No shortness of breath, no cough, no respiratory distress.   Cardiovascular:   No chest pain, no palpitations.   Gastrointestinal: No abdominal pain, no nausea, no vomiting , no diahrrea, no constipation, no hematochezia,no melena.  Skin:   No rashes, no jaundice, no eczema.   Musculoskeletal:   No joint pain, no swelling, no myalgia.   Neurologic:   No headache, no seizure, no weakness.   Genitourinary:   No dysuria, no decreased urine output.  Psychiatric:  No depression, no anxiety,   Endocrine:   No thyroid disease, no diabetes.  Heme/Lymphatic:   No anemia, no blood transfusions, no lymph node enlargement, no bleeding, no bruising.  ___________________________________________________________________________________________  Allergies    No Known Allergies    Intolerances      MEDICATIONS  (STANDING):  ciprofloxacin   IVPB 400 milliGRAM(s) IV Intermittent every 12 hours  ergocalciferol 88755 Unit(s) Oral <User Schedule>  metroNIDAZOLE  IVPB 500 milliGRAM(s) IV Intermittent every 8 hours    MEDICATIONS  (PRN):  acetaminophen   Tablet 650 milliGRAM(s) Oral every 6 hours PRN For Temp greater than 38 C (100.4 F)  acetaminophen   Tablet .. 650 milliGRAM(s) Oral once PRN Temp greater or equal to 38.5C (101.3F)  acetaminophen   Tablet. 650 milliGRAM(s) Oral every 6 hours PRN Mild Pain (1 - 3)  ketorolac   Injectable 15 milliGRAM(s) IV Push every 6 hours PRN Severe Pain (7 - 10)  ondansetron Injectable 4 milliGRAM(s) IV Push every 6 hours PRN Nausea and/or Vomiting  traMADol 25 milliGRAM(s) Oral every 6 hours PRN Moderate Pain (4 - 6)      PAST MEDICAL & SURGICAL HISTORY:  No pertinent past medical history  Hearing loss: Right-sided hearing loss at the age of 4. Prescribed a hearing aid.  No significant past surgical history    FAMILY HISTORY:  Family history of ulcerative colitis (Sibling)  Family history of hypercholesterolemia    Social History: No hsitory of : Tobacco use, IVDA, EToH  ______________________________________________________________________________________    PHYSICAL EXAM    Daily     Daily Weight in k.8 (04 Sep 2018 17:32)  BMI: 32.3 ( @ 02:34)  Change in Weight:  Vital Signs Last 24 Hrs  T(C): 37.3 (05 Sep 2018 08:14), Max: 39.3 (04 Sep 2018 23:55)  T(F): 99.1 (05 Sep 2018 08:14), Max: 102.8 (04 Sep 2018 23:55)  HR: 105 (05 Sep 2018 08:14) (105 - 131)  BP: 132/68 (05 Sep 2018 08:14) (109/54 - 132/68)  BP(mean): --  RR: 16 (05 Sep 2018 08:14) (16 - 18)  SpO2: 97% (05 Sep 2018 08:14) (97% - 99%)    General:  Well developed, well nourished, alert and active, no pallor, NAD.  HEENT:    Normal appearance of conjunctiva, ears, nose, lips, oropharynx, and oral mucosa, anicteric.  Neck:  No masses, no asymmetry.  Lymph Nodes:  No lymphadenopathy.   Cardiovascular:  RRR normal S1/S2, no murmur.  Respiratory:  CTA B/L, normal respiratory effort.   Abdominal:   soft, no masses or tenderness, normoactive BS, NT/ND, no HSM.  Extremities:   No clubbing or cyanosis, normal capillary refill, no edema.   Skin:   No rash, jaundice, lesions, eczema.   Musculoskeletal:  No joint swelling, erythema or tenderness.   Neuro: No focal deficits.   Other:   _______________________________________________________________________________________________  Lab Results:                          14.1   15.7  )-----------( 260      ( 05 Sep 2018 07:02 )             43.3         138  |  105  |  9   ----------------------------<  103<H>  3.7   |  23  |  1.12    Ca    8.3<L>      05 Sep 2018 07:02  Phos  2.6       Mg     1.7         TPro  9.1<H>  /  Alb  4.2  /  TBili  0.4  /  DBili  x   /  AST  25  /  ALT  50  /  AlkPhos  112  09-04    LIVER FUNCTIONS - ( 04 Sep 2018 03:10 )  Alb: 4.2 g/dL / Pro: 9.1 g/dL / ALK PHOS: 112 U/L / ALT: 50 U/L DA / AST: 25 U/L / GGT: x           PT/INR - ( 04 Sep 2018 03:10 )   PT: 12.2 sec;   INR: 1.12 ratio         PTT - ( 04 Sep 2018 03:10 )  PTT:30.0 sec  C-Reactive Protein, Serum: 13.47 mg/dL ( @ 17:57)        Stool Results:          RADIOLOGY RESULTS:    EXAM:  CT ABDOMEN AND PELVIS IC                            PROCEDURE DATE:  2018          INTERPRETATION:  CLINICAL INFORMATION: Right upper quadrant abdominal   pain.     COMPARISON: None available.    PROCEDURE:   CT of the Abdomen and Pelvis was performed with intravenous contrast.   Intravenous contrast: 90 ml Omnipaque 350. 10 ml discarded.  Oral contrast: None.  Sagittal and coronal reformats were performed.    FINDINGS:    LOWER CHEST: Within normal limits.    LIVER: Steatosis.  BILE DUCTS: Normal caliber.  GALLBLADDER: Within normal limits.  SPLEEN: Within normal limits.  PANCREAS: Within normal limits.  ADRENALS: Within normal limits.  KIDNEYS/URETERS: Within normal limits.    BLADDER: Within normal limits.  REPRODUCTIVE ORGANS: The prostate is within normal limits.    BOWEL: Colonic bowel wall thickening involving the ascending and   transverse colon with associated mild pericolonic inflammatory change. No   bowel obstruction. Appendix normal.  PERITONEUM: No ascites. Few mildly prominent right lower quadrant lymph   nodes, likely reactive.   VESSELS:  Within normal limits.  RETROPERITONEUM: No lymphadenopathy.    ABDOMINAL WALL: Small fat-containing left inguinal hernia..  BONES: Within normal limits.    IMPRESSION:    Colitis of the ascending and transverse colon.                ESMER DU M.D., ATTENDING RADIOLOGIST  This document has been electronically signed. Sep  4 2018  3:59AM                SURGICAL PATHOLOGY: Patient is a 18y old  Male who presents with a chief complaint of fever, abdominal pain (05 Sep 2018 07:43)    18 year old male presents with fever and abdominal pain.  Mr. Esquivel was in his normal state of health. This past Saturday he ate Chinese food abn felt fine afterwards. This past Tuesday he started to experience high fever (102 Far) with severe diffsue abdominal pain with no radiation   His pain intensified to 8/10 and he presented to the ED. In ED he was found to be tachycardiac and was started on fluids and antibiotics Today he is feeling better. He only reports two episodes of non-bloody diarrhea.         REVIEW OF SYSTEMS  Constitutional:   +fever, no fatigue, no pallor, no night sweats, no weight loss.  HEENT:   No eye pain, no vision changes, no icterus, no mouth ulcers.  Respiratory:   No shortness of breath, no cough, no respiratory distress.   Cardiovascular:   No chest pain, no palpitations.   Gastrointestinal: + abdominal pain, no nausea, no vomiting , + diarrhea no constipation, no hematochezia, no melena.  Skin:   No rashes, no jaundice, no eczema.   Musculoskeletal:   No joint pain, no swelling, no myalgia.   Neurologic:   No headache, no seizure, no weakness.   Genitourinary:   No dysuria, no decreased urine output.  Psychiatric:  No depression, no anxiety,   Endocrine:   No thyroid disease, no diabetes.  Heme/Lymphatic:   No anemia, no blood transfusions, no lymph node enlargement, no bleeding, no bruising.  ___________________________________________________________________________________________  Allergies    No Known Allergies    Intolerances      MEDICATIONS  (STANDING):  ciprofloxacin   IVPB 400 milliGRAM(s) IV Intermittent every 12 hours  ergocalciferol 21760 Unit(s) Oral <User Schedule>  metroNIDAZOLE  IVPB 500 milliGRAM(s) IV Intermittent every 8 hours    MEDICATIONS  (PRN):  acetaminophen   Tablet 650 milliGRAM(s) Oral every 6 hours PRN For Temp greater than 38 C (100.4 F)  acetaminophen   Tablet .. 650 milliGRAM(s) Oral once PRN Temp greater or equal to 38.5C (101.3F)  acetaminophen   Tablet. 650 milliGRAM(s) Oral every 6 hours PRN Mild Pain (1 - 3)  ketorolac   Injectable 15 milliGRAM(s) IV Push every 6 hours PRN Severe Pain (7 - 10)  ondansetron Injectable 4 milliGRAM(s) IV Push every 6 hours PRN Nausea and/or Vomiting  traMADol 25 milliGRAM(s) Oral every 6 hours PRN Moderate Pain (4 - 6)      PAST MEDICAL & SURGICAL HISTORY:  No pertinent past medical history  Hearing loss: Right-sided hearing loss at the age of 4. Prescribed a hearing aid.  No significant past surgical history    FAMILY HISTORY:  Family history of ulcerative colitis (Sibling)  Family history of hypercholesterolemia    Social History: No history of : Tobacco use, IVDA, EToH  ______________________________________________________________________________________    PHYSICAL EXAM    Daily     Daily Weight in k.8 (04 Sep 2018 17:32)  BMI: 32.3 ( @ 02:34)  Change in Weight:  Vital Signs Last 24 Hrs  T(C): 37.3 (05 Sep 2018 08:14), Max: 39.3 (04 Sep 2018 23:55)  T(F): 99.1 (05 Sep 2018 08:14), Max: 102.8 (04 Sep 2018 23:55)  HR: 105 (05 Sep 2018 08:14) (105 - 131)  BP: 132/68 (05 Sep 2018 08:14) (109/54 - 132/68)  BP(mean): --  RR: 16 (05 Sep 2018 08:14) (16 - 18)  SpO2: 97% (05 Sep 2018 08:14) (97% - 99%)    General:  Well developed, well nourished, alert and active, no pallor, NAD.  HEENT:    Normal appearance of conjunctiva, ears, nose, lips, oropharynx, and oral mucosa, anicteric.  Neck:  No masses, no asymmetry.  Lymph Nodes:  No lymphadenopathy.   Cardiovascular:  RRR normal S1/S2, no murmur.  Respiratory:  CTA B/L, normal respiratory effort.   Abdominal:   soft, no masses or tenderness, normoactive BS, NT/ND, no HSM.  Extremities:   No clubbing or cyanosis, normal capillary refill, no edema.   Skin:   No rash, jaundice, lesions, eczema.   Musculoskeletal:  No joint swelling, erythema or tenderness.   Neuro: No focal deficits.   Other:   _______________________________________________________________________________________________  Lab Results:                          14.1   15.7  )-----------( 260      ( 05 Sep 2018 07:02 )             43.3         138  |  105  |  9   ----------------------------<  103<H>  3.7   |  23  |  1.12    Ca    8.3<L>      05 Sep 2018 07:02  Phos  2.6     -  Mg     1.7         TPro  9.1<H>  /  Alb  4.2  /  TBili  0.4  /  DBili  x   /  AST  25  /  ALT  50  /  AlkPhos  112  09-04    LIVER FUNCTIONS - ( 04 Sep 2018 03:10 )  Alb: 4.2 g/dL / Pro: 9.1 g/dL / ALK PHOS: 112 U/L / ALT: 50 U/L DA / AST: 25 U/L / GGT: x           PT/INR - ( 04 Sep 2018 03:10 )   PT: 12.2 sec;   INR: 1.12 ratio         PTT - ( 04 Sep 2018 03:10 )  PTT:30.0 sec  C-Reactive Protein, Serum: 13.47 mg/dL ( @ 17:57)        Stool Results:          RADIOLOGY RESULTS:    EXAM:  CT ABDOMEN AND PELVIS IC                            PROCEDURE DATE:  2018          INTERPRETATION:  CLINICAL INFORMATION: Right upper quadrant abdominal   pain.     COMPARISON: None available.    PROCEDURE:   CT of the Abdomen and Pelvis was performed with intravenous contrast.   Intravenous contrast: 90 ml Omnipaque 350. 10 ml discarded.  Oral contrast: None.  Sagittal and coronal reformats were performed.    FINDINGS:    LOWER CHEST: Within normal limits.    LIVER: Steatosis.  BILE DUCTS: Normal caliber.  GALLBLADDER: Within normal limits.  SPLEEN: Within normal limits.  PANCREAS: Within normal limits.  ADRENALS: Within normal limits.  KIDNEYS/URETERS: Within normal limits.    BLADDER: Within normal limits.  REPRODUCTIVE ORGANS: The prostate is within normal limits.    BOWEL: Colonic bowel wall thickening involving the ascending and   transverse colon with associated mild pericolonic inflammatory change. No   bowel obstruction. Appendix normal.  PERITONEUM: No ascites. Few mildly prominent right lower quadrant lymph   nodes, likely reactive.   VESSELS:  Within normal limits.  RETROPERITONEUM: No lymphadenopathy.    ABDOMINAL WALL: Small fat-containing left inguinal hernia..  BONES: Within normal limits.    IMPRESSION:    Colitis of the ascending and transverse colon.                ESMER DU M.D., ATTENDING RADIOLOGIST  This document has been electronically signed. Sep  4 2018  3:59AM                SURGICAL PATHOLOGY:

## 2018-09-05 NOTE — PROGRESS NOTE ADULT - SUBJECTIVE AND OBJECTIVE BOX
PGY 1 Note discussed with supervising resident and primary attending    Patient is a 18y old  Male who presents with a chief complaint of fever, abdominal pain (04 Sep 2018 05:44)      INTERVAL HPI/OVERNIGHT EVENTS: 2 episodes of lose stools & overnight fever    MEDICATIONS  (STANDING):  ciprofloxacin   IVPB 400 milliGRAM(s) IV Intermittent every 12 hours  ergocalciferol 65215 Unit(s) Oral every week  metroNIDAZOLE  IVPB 500 milliGRAM(s) IV Intermittent every 8 hours    MEDICATIONS  (PRN):  acetaminophen   Tablet 650 milliGRAM(s) Oral every 6 hours PRN For Temp greater than 38 C (100.4 F)  acetaminophen   Tablet .. 650 milliGRAM(s) Oral once PRN Temp greater or equal to 38.5C (101.3F)  acetaminophen   Tablet. 650 milliGRAM(s) Oral every 6 hours PRN Mild Pain (1 - 3)  ketorolac   Injectable 15 milliGRAM(s) IV Push every 6 hours PRN Severe Pain (7 - 10)  ondansetron Injectable 4 milliGRAM(s) IV Push every 6 hours PRN Nausea and/or Vomiting  traMADol 25 milliGRAM(s) Oral every 6 hours PRN Moderate Pain (4 - 6)      __________________________________________________  REVIEW OF SYSTEMS:    CONSTITUTIONAL: No fever,   EYES: no acute visual disturbances  NECK: No pain or stiffness  RESPIRATORY: No cough; No shortness of breath  CARDIOVASCULAR: No chest pain, no palpitations  GASTROINTESTINAL: No pain. No nausea or vomiting; No diarrhea   NEUROLOGICAL: No headache or numbness, no tremors  MUSCULOSKELETAL: No joint pain, no muscle pain  GENITOURINARY: no dysuria, no frequency, no hesitancy  PSYCHIATRY: no depression , no anxiety  ALL OTHER  ROS negative        Vital Signs Last 24 Hrs  T(C): 37.3 (05 Sep 2018 02:55), Max: 39.3 (04 Sep 2018 23:55)  T(F): 99.1 (05 Sep 2018 02:55), Max: 102.8 (04 Sep 2018 23:55)  HR: 112 (05 Sep 2018 02:55) (112 - 133)  BP: 110/41 (05 Sep 2018 02:55) (93/34 - 130/57)  BP(mean): --  RR: 18 (05 Sep 2018 02:55) (18 - 18)  SpO2: 98% (05 Sep 2018 02:55) (97% - 99%)    ________________________________________________  PHYSICAL EXAM:  GENERAL: NAD  HEENT: Normocephalic;  conjunctivae and sclerae clear; moist mucous membranes;   NECK : supple  CHEST/LUNG: Clear to auscultation bilaterally with good air entry   HEART: S1 S2  regular; no murmurs, gallops or rubs  ABDOMEN: Soft, Nontender, Nondistended; Bowel sounds present  EXTREMITIES: no cyanosis; no edema; no calf tenderness  SKIN: warm and dry; no rash  NERVOUS SYSTEM:  Awake and alert; Oriented  to place, person and time ; no new deficits    _________________________________________________  LABS:                        13.6   16.9  )-----------( 295      ( 04 Sep 2018 10:40 )             42.4     09-04    140  |  108  |  13  ----------------------------<  99  3.7   |  25  |  1.10    Ca    8.5      04 Sep 2018 10:40  Phos  2.3     09-04  Mg     1.7     09-04    TPro  9.1<H>  /  Alb  4.2  /  TBili  0.4  /  DBili  x   /  AST  25  /  ALT  50  /  AlkPhos  112  09-04    PT/INR - ( 04 Sep 2018 03:10 )   PT: 12.2 sec;   INR: 1.12 ratio         PTT - ( 04 Sep 2018 03:10 )  PTT:30.0 sec  Urinalysis Basic - ( 04 Sep 2018 05:45 )    Color: Yellow / Appearance: Clear / S.015 / pH: x  Gluc: x / Ketone: Negative  / Bili: Negative / Urobili: Negative   Blood: x / Protein: 30 mg/dL / Nitrite: Negative   Leuk Esterase: Negative / RBC: 2-5 /HPF / WBC 0-2 /HPF   Sq Epi: x / Non Sq Epi: Occasional /HPF / Bacteria: x      CAPILLARY BLOOD GLUCOSE            RADIOLOGY & ADDITIONAL TESTS:    Imaging Personally Reviewed:  YES/NO    Consultant(s) Notes Reviewed:   YES/ No    Care Discussed with Consultants :     Plan of care was discussed with patient and /or primary care giver; all questions and concerns were addressed and care was aligned with patient's wishes.

## 2018-09-05 NOTE — PROGRESS NOTE ADULT - PROBLEM SELECTOR PLAN 1
s/p code sepsis in emergency  Elevated wbc counts, initial belly pain, vomiting & nausea  Ate chinese food on saturday & symptoms started on tuesday  CT abdomen shows colitis of ascending & transverse colon  asca & anca negative  On cipro & metro s/p 1 dose of zosyn in ed  ova & parasites & calprotectin awaited  tolerating oral liquid diet s/p code sepsis in emergency  Elevated wbc counts, initial belly pain, vomiting & nausea  Ate chinese food on saturday & symptoms started on tuesday  CT abdomen shows colitis of ascending & transverse colon  asca & anca negative  On cipro & metro s/p 1 dose of zosyn in ed  ova & parasites & calprotectin awaited  advanced diet to full liquid

## 2018-09-06 LAB
ANION GAP SERPL CALC-SCNC: 8 MMOL/L — SIGNIFICANT CHANGE UP (ref 5–17)
BUN SERPL-MCNC: 8 MG/DL — SIGNIFICANT CHANGE UP (ref 7–18)
CALCIUM SERPL-MCNC: 8.8 MG/DL — SIGNIFICANT CHANGE UP (ref 8.4–10.5)
CHLORIDE SERPL-SCNC: 105 MMOL/L — SIGNIFICANT CHANGE UP (ref 96–108)
CO2 SERPL-SCNC: 26 MMOL/L — SIGNIFICANT CHANGE UP (ref 22–31)
CREAT SERPL-MCNC: 0.97 MG/DL — SIGNIFICANT CHANGE UP (ref 0.5–1.3)
GLUCOSE SERPL-MCNC: 108 MG/DL — HIGH (ref 70–99)
HCT VFR BLD CALC: 43.1 % — SIGNIFICANT CHANGE UP (ref 39–50)
HGB BLD-MCNC: 14.2 G/DL — SIGNIFICANT CHANGE UP (ref 13–17)
MAGNESIUM SERPL-MCNC: 2.2 MG/DL — SIGNIFICANT CHANGE UP (ref 1.6–2.6)
MCHC RBC-ENTMCNC: 28.2 PG — SIGNIFICANT CHANGE UP (ref 27–34)
MCHC RBC-ENTMCNC: 33 GM/DL — SIGNIFICANT CHANGE UP (ref 32–36)
MCV RBC AUTO: 85.3 FL — SIGNIFICANT CHANGE UP (ref 80–100)
PHOSPHATE SERPL-MCNC: 2.4 MG/DL — LOW (ref 2.5–4.5)
PLATELET # BLD AUTO: 256 K/UL — SIGNIFICANT CHANGE UP (ref 150–400)
POTASSIUM SERPL-MCNC: 3.5 MMOL/L — SIGNIFICANT CHANGE UP (ref 3.5–5.3)
POTASSIUM SERPL-SCNC: 3.5 MMOL/L — SIGNIFICANT CHANGE UP (ref 3.5–5.3)
RBC # BLD: 5.05 M/UL — SIGNIFICANT CHANGE UP (ref 4.2–5.8)
RBC # FLD: 13.5 % — SIGNIFICANT CHANGE UP (ref 10.3–14.5)
SODIUM SERPL-SCNC: 139 MMOL/L — SIGNIFICANT CHANGE UP (ref 135–145)
WBC # BLD: 10.2 K/UL — SIGNIFICANT CHANGE UP (ref 3.8–10.5)
WBC # FLD AUTO: 10.2 K/UL — SIGNIFICANT CHANGE UP (ref 3.8–10.5)

## 2018-09-06 RX ORDER — SODIUM,POTASSIUM PHOSPHATES 278-250MG
1 POWDER IN PACKET (EA) ORAL
Qty: 0 | Refills: 0 | Status: DISCONTINUED | OUTPATIENT
Start: 2018-09-06 | End: 2018-09-07

## 2018-09-06 RX ADMIN — Medication 100 MILLIGRAM(S): at 05:14

## 2018-09-06 RX ADMIN — Medication 1 TABLET(S): at 17:38

## 2018-09-06 RX ADMIN — Medication 200 MILLIGRAM(S): at 05:14

## 2018-09-06 RX ADMIN — Medication 100 MILLIGRAM(S): at 14:07

## 2018-09-06 RX ADMIN — Medication 100 MILLIGRAM(S): at 21:29

## 2018-09-06 RX ADMIN — Medication 1 TABLET(S): at 14:07

## 2018-09-06 RX ADMIN — Medication 200 MILLIGRAM(S): at 17:38

## 2018-09-06 RX ADMIN — Medication 1 TABLET(S): at 21:29

## 2018-09-06 NOTE — PROGRESS NOTE ADULT - ASSESSMENT
17 yo male with no PMH or PSH ,comes in with abdominal pain and fever at home. Patient complaint of high grade fever at home , with some chills. Also experienced generalized abdominal pain , 4/10 in intensity , with no radiation to back. Patient also reported nausea and 4 episodes of NBNP vomiting. Last bm was yest night , formed stools , no diarrhea , no blood in stools. No constipation. Denies any sick contacts / hospitalizations. Denies any h/o similar episode.   Negative for all other ROS. Had outside food ( chinese ) , but on Saturday , nobody else ate same food. No recent travel.   Family history : sister has Ulcerative Colitis     In Ed , BP : 121/ 80 mm hg , Hr : 152 , Temp : 102.8 F  Code Sepsis was called in Ed, patient was given one dose of Zosyn , 2700ml fluid , after which temp and Hr normalized   cbc shows white count of 16 with left shift   bmp wnl , lactate wnl   CT A/P shows colitis of Ascending colon and Transverse Colon     Will admit to medicine for Sepsis secondary to Colitis.

## 2018-09-06 NOTE — PROGRESS NOTE ADULT - SUBJECTIVE AND OBJECTIVE BOX
INTERVAL HPI/OVERNIGHT EVENTS: Seen and examined with family in room . I feel fine.   Vital Signs Last 24 Hrs  T(C): 36.7 (06 Sep 2018 08:47), Max: 37.3 (05 Sep 2018 23:38)  T(F): 98 (06 Sep 2018 08:47), Max: 99.2 (05 Sep 2018 23:38)  HR: 93 (06 Sep 2018 08:47) (90 - 100)  BP: 115/57 (06 Sep 2018 08:47) (115/57 - 127/77)  BP(mean): --  RR: 16 (06 Sep 2018 08:47) (16 - 16)  SpO2: 98% (06 Sep 2018 08:47) (98% - 99%)  I&O's Summary    MEDICATIONS  (STANDING):  ciprofloxacin   IVPB 400 milliGRAM(s) IV Intermittent every 12 hours  ergocalciferol 41441 Unit(s) Oral <User Schedule>  metroNIDAZOLE  IVPB 500 milliGRAM(s) IV Intermittent every 8 hours    MEDICATIONS  (PRN):  acetaminophen   Tablet 650 milliGRAM(s) Oral every 6 hours PRN For Temp greater than 38 C (100.4 F)  acetaminophen   Tablet .. 650 milliGRAM(s) Oral once PRN Temp greater or equal to 38.5C (101.3F)  acetaminophen   Tablet. 650 milliGRAM(s) Oral every 6 hours PRN Mild Pain (1 - 3)  ketorolac   Injectable 15 milliGRAM(s) IV Push every 6 hours PRN Severe Pain (7 - 10)  ondansetron Injectable 4 milliGRAM(s) IV Push every 6 hours PRN Nausea and/or Vomiting  traMADol 25 milliGRAM(s) Oral every 6 hours PRN Moderate Pain (4 - 6)    LABS:                        14.2   10.2  )-----------( 256      ( 06 Sep 2018 07:19 )             43.1     09-06    139  |  105  |  8   ----------------------------<  108<H>  3.5   |  26  |  0.97    Ca    8.8      06 Sep 2018 07:19  Phos  2.4     09-06  Mg     2.2     09-06          CAPILLARY BLOOD GLUCOSE              REVIEW OF SYSTEMS:  CONSTITUTIONAL: No fever, weight loss, or fatigue  EYES: No eye pain, visual disturbances, or discharge  ENMT:  No difficulty hearing, tinnitus, vertigo; No sinus or throat pain  NECK: No pain or stiffness  BREASTS: No pain, masses, or nipple discharge  RESPIRATORY: No cough, wheezing, chills or hemoptysis; No shortness of breath  CARDIOVASCULAR: No chest pain, palpitations, dizziness, or leg swelling  GASTROINTESTINAL: No abdominal or epigastric pain. No nausea, vomiting, or hematemesis; No diarrhea or constipation. No melena or hematochezia.  GENITOURINARY: No dysuria, frequency, hematuria, or incontinence  NEUROLOGICAL: No headaches, memory loss, loss of strength, numbness, or tremors  SKIN: No itching, burning, rashes, or lesions     Consultant(s) Notes Reviewed:  [x ] YES  [ ] NO    PHYSICAL EXAM:  GENERAL: NAD, well-groomed, well-developed,not in any distress ,  HEAD:  Atraumatic, Normocephalic  EYES: EOMI, PERRLA, conjunctiva and sclera clear  ENMT: No tonsillar erythema, exudates, or enlargement; Moist mucous membranes, Good dentition, No lesions  NECK: Supple, No JVD, Normal thyroid  NERVOUS SYSTEM:  Alert & Oriented X3, No focal deficit   CHEST/LUNG: Good air entry bilateral with no  rales, rhonchi, wheezing, or rubs  HEART: Regular rate and rhythm; No murmurs, rubs, or gallops  ABDOMEN: Soft, Nontender, Nondistended; Bowel sounds present  EXTREMITIES:  2+ Peripheral Pulses, No clubbing, cyanosis, or edema  SKIN: No rashes or lesions    Care Discussed with Consultants/Other Providers [ x] YES  [ ] NO

## 2018-09-06 NOTE — PROGRESS NOTE ADULT - ASSESSMENT
17 yo male with no PMH or PSH ,comes in with abdominal pain and fever at home. Patient complaint of high grade fever at home , with some chills. Also experienced generalized abdominal pain , 4/10 in intensity , with no radiation to back. Patient also reported nausea and 4 episodes of NBNP vomiting. Last bm was yest night , formed stools , no diarrhea , no blood in stools. No constipation. Denies any sick contacts / hospitalizations. Denies any h/o similar episode.      Problem/Plan - 1:  ·  Problem: Sepsis.  Plan: Present on admission. Improving. IV Abxs and S/P IVF.     Problem/Plan - 2:  ·  Problem: Acute colitis .  Plan: Advancing diet . On Abxs for total 10 days . GI consult noted . Work up in progress . Needs outpt Colonoscopy.      Problem/Plan - 2:  ·  Problem: Prophylactic measure.  Plan: IMPROVE VTE Individual Risk Assessment; Ambulating

## 2018-09-06 NOTE — MEDICAL STUDENT ADULT H&P (EDUCATION) - NS MD HP STUD HX OF PRESENT ILLNESS FT
19 yo male came in for right sided abdominal pain, fever and NBNB vomiting which began 2 d ago. The pain is non radiating, sharp and most prominent in the right upper quadrant. 4/10 in severity and associated with fever. He said his mother gave him Pepto-Bismol and Tylenol which did not help. 17 yo male came in for right sided abdominal pain, fever and NBNB vomiting which began 2 d ago. The pain is non radiating, sharp and most prominent in the right upper quadrant. 4/10 in severity and associated with fever. He said his mother gave him Pepto-Bismol and Tylenol which did not help.  Patient reported eating chinese food on saturday and noticed the pain began 1 day after.

## 2018-09-06 NOTE — PROGRESS NOTE ADULT - SUBJECTIVE AND OBJECTIVE BOX
PGY 1 Note discussed with supervising resident and primary attending    Patient is a 18y old  Male who presents with a chief complaint of fever, abdominal pain (06 Sep 2018 10:17)      INTERVAL HPI/OVERNIGHT EVENTS: offers no new complaints; current symptoms resolving    MEDICATIONS  (STANDING):  ciprofloxacin   IVPB 400 milliGRAM(s) IV Intermittent every 12 hours  ergocalciferol 02973 Unit(s) Oral <User Schedule>  metroNIDAZOLE  IVPB 500 milliGRAM(s) IV Intermittent every 8 hours  potassium acid phosphate/sodium acid phosphate tablet (K-PHOS No. 2) 1 Tablet(s) Oral four times a day with meals    MEDICATIONS  (PRN):  acetaminophen   Tablet 650 milliGRAM(s) Oral every 6 hours PRN For Temp greater than 38 C (100.4 F)  acetaminophen   Tablet .. 650 milliGRAM(s) Oral once PRN Temp greater or equal to 38.5C (101.3F)  acetaminophen   Tablet. 650 milliGRAM(s) Oral every 6 hours PRN Mild Pain (1 - 3)  ketorolac   Injectable 15 milliGRAM(s) IV Push every 6 hours PRN Severe Pain (7 - 10)  ondansetron Injectable 4 milliGRAM(s) IV Push every 6 hours PRN Nausea and/or Vomiting  traMADol 25 milliGRAM(s) Oral every 6 hours PRN Moderate Pain (4 - 6)      __________________________________________________  REVIEW OF SYSTEMS:    CONSTITUTIONAL: No fever,   EYES: no acute visual disturbances  NECK: No pain or stiffness  RESPIRATORY: No cough; No shortness of breath  CARDIOVASCULAR: No chest pain, no palpitations  GASTROINTESTINAL: No pain. No nausea or vomiting; No diarrhea   NEUROLOGICAL: No headache or numbness, no tremors  MUSCULOSKELETAL: No joint pain, no muscle pain  GENITOURINARY: no dysuria, no frequency, no hesitancy  PSYCHIATRY: no depression , no anxiety  ALL OTHER  ROS negative        Vital Signs Last 24 Hrs  T(C): 36.7 (06 Sep 2018 08:47), Max: 37.3 (05 Sep 2018 23:38)  T(F): 98 (06 Sep 2018 08:47), Max: 99.2 (05 Sep 2018 23:38)  HR: 93 (06 Sep 2018 08:47) (90 - 100)  BP: 115/57 (06 Sep 2018 08:47) (115/57 - 127/77)  BP(mean): --  RR: 16 (06 Sep 2018 08:47) (16 - 16)  SpO2: 98% (06 Sep 2018 08:47) (98% - 99%)    ________________________________________________  PHYSICAL EXAM:  GENERAL: NAD  HEENT: Normocephalic;  conjunctivae and sclerae clear; moist mucous membranes;   NECK : supple  CHEST/LUNG: Clear to auscultation bilaterally with good air entry   HEART: S1 S2  regular; no murmurs, gallops or rubs  ABDOMEN: Soft, Nontender, Nondistended; Bowel sounds present  EXTREMITIES: no cyanosis; no edema; no calf tenderness  SKIN: warm and dry; no rash  NERVOUS SYSTEM:  Awake and alert; Oriented  to place, person and time ; no new deficits    _________________________________________________  LABS:                        14.2   10.2  )-----------( 256      ( 06 Sep 2018 07:19 )             43.1     09-06    139  |  105  |  8   ----------------------------<  108<H>  3.5   |  26  |  0.97    Ca    8.8      06 Sep 2018 07:19  Phos  2.4     09-06  Mg     2.2     09-06          CAPILLARY BLOOD GLUCOSE            RADIOLOGY & ADDITIONAL TESTS:    Imaging Personally Reviewed:  YES/NO    Consultant(s) Notes Reviewed:   YES/ No    Care Discussed with Consultants :     Plan of care was discussed with patient and /or primary care giver; all questions and concerns were addressed and care was aligned with patient's wishes.

## 2018-09-06 NOTE — PROGRESS NOTE ADULT - PROBLEM SELECTOR PLAN 1
s/p code sepsis in emergency  Elevated wbc counts, initial belly pain, vomiting & nausea  Ate chinese food on saturday & symptoms started on tuesday  CT abdomen shows colitis of ascending & transverse colon  asca & anca negative  On cipro & metro s/p 1 dose of zosyn in ed  ova & parasites & calprotectin awaited  advanced diet to full liquid s/p code sepsis in emergency  Elevated wbc counts, initial belly pain, vomiting & nausea  Ate chinese food on saturday & symptoms started on tuesday  CT abdomen shows colitis of ascending & transverse colon  asca & anca negative  On cipro & metro s/p 1 dose of zosyn in ed  ova & parasites & calprotectin awaited  advanced to full liquid diet

## 2018-09-06 NOTE — MEDICAL STUDENT ADULT H&P (EDUCATION) - NS MD HP STUD ASPLAN ASSES FT
17 yo M with no significant PMHx presented with abdominal pain, fever, nausea and vomiting. Blood cultures were negative.

## 2018-09-06 NOTE — MEDICAL STUDENT ADULT H&P (EDUCATION) - NS MD HP STUD MEDICATIONS FT
None MEDICATIONS  (STANDING):  ciprofloxacin   IVPB 400 milliGRAM(s) IV Intermittent every 12 hours  ergocalciferol 21805 Unit(s) Oral   metroNIDAZOLE  IVPB 500 milliGRAM(s) IV Intermittent every 8 hours  potassium acid phosphate/sodium acid phosphate tablet (K-PHOS No. 2) 1 Tablet(s) Oral four times a day with meals    MEDICATIONS  (PRN):  acetaminophen   Tablet 650 milliGRAM(s) Oral every 6 hours PRN For Temp greater than 38 C (100.4 F)  acetaminophen   Tablet .. 650 milliGRAM(s) Oral once PRN Temp greater or equal to 38.5C (101.3F)  acetaminophen   Tablet. 650 milliGRAM(s) Oral every 6 hours PRN Mild Pain (1 - 3)  ketorolac   Injectable 15 milliGRAM(s) IV Push every 6 hours PRN Severe Pain (7 - 10)  ondansetron Injectable 4 milliGRAM(s) IV Push every 6 hours PRN Nausea and/or Vomiting  traMADol 25 milliGRAM(s) Oral every 6 hours PRN Moderate Pain (4 - 6)

## 2018-09-07 ENCOUNTER — TRANSCRIPTION ENCOUNTER (OUTPATIENT)
Age: 18
End: 2018-09-07

## 2018-09-07 VITALS
TEMPERATURE: 98 F | OXYGEN SATURATION: 98 % | HEART RATE: 68 BPM | RESPIRATION RATE: 16 BRPM | SYSTOLIC BLOOD PRESSURE: 131 MMHG | DIASTOLIC BLOOD PRESSURE: 77 MMHG

## 2018-09-07 LAB
ANION GAP SERPL CALC-SCNC: 8 MMOL/L — SIGNIFICANT CHANGE UP (ref 5–17)
BUN SERPL-MCNC: 12 MG/DL — SIGNIFICANT CHANGE UP (ref 7–18)
CALCIUM SERPL-MCNC: 9.2 MG/DL — SIGNIFICANT CHANGE UP (ref 8.4–10.5)
CHLORIDE SERPL-SCNC: 108 MMOL/L — SIGNIFICANT CHANGE UP (ref 96–108)
CO2 SERPL-SCNC: 26 MMOL/L — SIGNIFICANT CHANGE UP (ref 22–31)
CREAT SERPL-MCNC: 0.94 MG/DL — SIGNIFICANT CHANGE UP (ref 0.5–1.3)
GLUCOSE SERPL-MCNC: 90 MG/DL — SIGNIFICANT CHANGE UP (ref 70–99)
HCT VFR BLD CALC: 46.7 % — SIGNIFICANT CHANGE UP (ref 39–50)
HGB BLD-MCNC: 15.1 G/DL — SIGNIFICANT CHANGE UP (ref 13–17)
MAGNESIUM SERPL-MCNC: 2.2 MG/DL — SIGNIFICANT CHANGE UP (ref 1.6–2.6)
MCHC RBC-ENTMCNC: 27.6 PG — SIGNIFICANT CHANGE UP (ref 27–34)
MCHC RBC-ENTMCNC: 32.3 GM/DL — SIGNIFICANT CHANGE UP (ref 32–36)
MCV RBC AUTO: 85.5 FL — SIGNIFICANT CHANGE UP (ref 80–100)
PHOSPHATE SERPL-MCNC: 3.5 MG/DL — SIGNIFICANT CHANGE UP (ref 2.5–4.5)
PLATELET # BLD AUTO: 340 K/UL — SIGNIFICANT CHANGE UP (ref 150–400)
POTASSIUM SERPL-MCNC: 3.8 MMOL/L — SIGNIFICANT CHANGE UP (ref 3.5–5.3)
POTASSIUM SERPL-SCNC: 3.8 MMOL/L — SIGNIFICANT CHANGE UP (ref 3.5–5.3)
RBC # BLD: 5.46 M/UL — SIGNIFICANT CHANGE UP (ref 4.2–5.8)
RBC # FLD: 13.6 % — SIGNIFICANT CHANGE UP (ref 10.3–14.5)
SODIUM SERPL-SCNC: 142 MMOL/L — SIGNIFICANT CHANGE UP (ref 135–145)
WBC # BLD: 8.3 K/UL — SIGNIFICANT CHANGE UP (ref 3.8–10.5)
WBC # FLD AUTO: 8.3 K/UL — SIGNIFICANT CHANGE UP (ref 3.8–10.5)

## 2018-09-07 PROCEDURE — 86036 ANCA SCREEN EACH ANTIBODY: CPT

## 2018-09-07 PROCEDURE — 83520 IMMUNOASSAY QUANT NOS NONAB: CPT

## 2018-09-07 PROCEDURE — 96375 TX/PRO/DX INJ NEW DRUG ADDON: CPT

## 2018-09-07 PROCEDURE — 96374 THER/PROPH/DIAG INJ IV PUSH: CPT | Mod: XU

## 2018-09-07 PROCEDURE — 83605 ASSAY OF LACTIC ACID: CPT

## 2018-09-07 PROCEDURE — 80061 LIPID PANEL: CPT

## 2018-09-07 PROCEDURE — 71046 X-RAY EXAM CHEST 2 VIEWS: CPT

## 2018-09-07 PROCEDURE — 82306 VITAMIN D 25 HYDROXY: CPT

## 2018-09-07 PROCEDURE — 85027 COMPLETE CBC AUTOMATED: CPT

## 2018-09-07 PROCEDURE — 80053 COMPREHEN METABOLIC PANEL: CPT

## 2018-09-07 PROCEDURE — 83735 ASSAY OF MAGNESIUM: CPT

## 2018-09-07 PROCEDURE — 87040 BLOOD CULTURE FOR BACTERIA: CPT

## 2018-09-07 PROCEDURE — 83993 ASSAY FOR CALPROTECTIN FECAL: CPT

## 2018-09-07 PROCEDURE — 80048 BASIC METABOLIC PNL TOTAL CA: CPT

## 2018-09-07 PROCEDURE — 84439 ASSAY OF FREE THYROXINE: CPT

## 2018-09-07 PROCEDURE — 99285 EMERGENCY DEPT VISIT HI MDM: CPT | Mod: 25

## 2018-09-07 PROCEDURE — 74177 CT ABD & PELVIS W/CONTRAST: CPT

## 2018-09-07 PROCEDURE — 84481 FREE ASSAY (FT-3): CPT

## 2018-09-07 PROCEDURE — 86140 C-REACTIVE PROTEIN: CPT

## 2018-09-07 PROCEDURE — 85730 THROMBOPLASTIN TIME PARTIAL: CPT

## 2018-09-07 PROCEDURE — 85610 PROTHROMBIN TIME: CPT

## 2018-09-07 PROCEDURE — 85652 RBC SED RATE AUTOMATED: CPT

## 2018-09-07 PROCEDURE — 82607 VITAMIN B-12: CPT

## 2018-09-07 PROCEDURE — 87086 URINE CULTURE/COLONY COUNT: CPT

## 2018-09-07 PROCEDURE — 83036 HEMOGLOBIN GLYCOSYLATED A1C: CPT

## 2018-09-07 PROCEDURE — 84100 ASSAY OF PHOSPHORUS: CPT

## 2018-09-07 PROCEDURE — 99232 SBSQ HOSP IP/OBS MODERATE 35: CPT

## 2018-09-07 PROCEDURE — 84443 ASSAY THYROID STIM HORMONE: CPT

## 2018-09-07 PROCEDURE — 81001 URINALYSIS AUTO W/SCOPE: CPT

## 2018-09-07 PROCEDURE — G0378: CPT

## 2018-09-07 RX ORDER — SODIUM,POTASSIUM PHOSPHATES 278-250MG
1 POWDER IN PACKET (EA) ORAL
Qty: 0 | Refills: 0 | Status: COMPLETED | OUTPATIENT
Start: 2018-09-07 | End: 2018-09-07

## 2018-09-07 RX ORDER — METRONIDAZOLE 500 MG
1 TABLET ORAL
Qty: 18 | Refills: 0
Start: 2018-09-07 | End: 2018-09-12

## 2018-09-07 RX ORDER — ERGOCALCIFEROL 1.25 MG/1
1 CAPSULE ORAL
Qty: 4 | Refills: 0
Start: 2018-09-07 | End: 2018-10-06

## 2018-09-07 RX ORDER — CIPROFLOXACIN LACTATE 400MG/40ML
1 VIAL (ML) INTRAVENOUS
Qty: 12 | Refills: 0
Start: 2018-09-07 | End: 2018-09-12

## 2018-09-07 RX ADMIN — Medication 100 MILLIGRAM(S): at 05:37

## 2018-09-07 RX ADMIN — Medication 100 MILLIGRAM(S): at 13:38

## 2018-09-07 RX ADMIN — Medication 1 TABLET(S): at 13:39

## 2018-09-07 RX ADMIN — Medication 200 MILLIGRAM(S): at 05:37

## 2018-09-07 RX ADMIN — Medication 1 TABLET(S): at 09:07

## 2018-09-07 NOTE — DISCHARGE NOTE ADULT - CARE PLAN
Principal Discharge DX:	Acute colitis  Goal:	Continue with your antibiotics & follow up with Dr. Juarez within 1 month  Assessment and plan of treatment:	You came in with fever & abdominal pain. You were found to have colitis and you were started on antibiotics. Please complete your antibiotics and follow up with Dr. Juarez at out-patient clinic at [address] [place] [address].  Secondary Diagnosis:	Vitamin D deficiency  Assessment and plan of treatment:	Your vitamin D levels were low. Please take the capsule once a week for 4 weeks and then get your levels rechecked in 1 month  Secondary Diagnosis:	Thyroid dysfunction  Assessment and plan of treatment:	Your thyroid test done in hospital were not in normal range. You are advised to please follow up with your primary doctor and get your thyroid function tests repeated in 6 weeks

## 2018-09-07 NOTE — DISCHARGE NOTE ADULT - MEDICATION SUMMARY - MEDICATIONS TO TAKE
I will START or STAY ON the medications listed below when I get home from the hospital:    metroNIDAZOLE 500 mg oral tablet  -- 1 tab(s) by mouth 3 times a day   -- Do not drink alcoholic beverages when taking this medication.  Finish all this medication unless otherwise directed by prescriber.  May discolor urine or feces.    -- Indication: For colitis    ciprofloxacin 500 mg oral tablet  -- 1 tab(s) by mouth 2 times a day   -- Avoid prolonged or excessive exposure to direct and/or artificial sunlight while taking this medication.  Check with your doctor before becoming pregnant.  Do not take dairy products, antacids, or iron preparations within one hour of this medication.  Finish all this medication unless otherwise directed by prescriber.  Medication should be taken with plenty of water.    -- Indication: For colitis    ergocalciferol 50,000 intl units (1.25 mg) oral capsule  -- 1 cap(s) by mouth once a week   -- Indication: For vitamin supplement

## 2018-09-07 NOTE — PROGRESS NOTE ADULT - ASSESSMENT
Doing well . No longer having abdominal pain or diarrhea. Discharge planning with follow up at my office [phone]

## 2018-09-07 NOTE — PROGRESS NOTE ADULT - SUBJECTIVE AND OBJECTIVE BOX
Subjective:   Doing well. No complaints     Objective:    MEDICATIONS  (STANDING):  ciprofloxacin   IVPB 400 milliGRAM(s) IV Intermittent every 12 hours  ergocalciferol 67311 Unit(s) Oral <User Schedule>  metroNIDAZOLE  IVPB 500 milliGRAM(s) IV Intermittent every 8 hours  potassium acid phosphate/sodium acid phosphate tablet (K-PHOS No. 2) 1 Tablet(s) Oral four times a day with meals    MEDICATIONS  (PRN):  acetaminophen   Tablet 650 milliGRAM(s) Oral every 6 hours PRN For Temp greater than 38 C (100.4 F)  acetaminophen   Tablet .. 650 milliGRAM(s) Oral once PRN Temp greater or equal to 38.5C (101.3F)  acetaminophen   Tablet. 650 milliGRAM(s) Oral every 6 hours PRN Mild Pain (1 - 3)  ketorolac   Injectable 15 milliGRAM(s) IV Push every 6 hours PRN Severe Pain (7 - 10)  ondansetron Injectable 4 milliGRAM(s) IV Push every 6 hours PRN Nausea and/or Vomiting  traMADol 25 milliGRAM(s) Oral every 6 hours PRN Moderate Pain (4 - 6)              Vital Signs Last 24 Hrs  T(C): 36.8 (07 Sep 2018 07:13), Max: 37 (06 Sep 2018 23:50)  T(F): 98.3 (07 Sep 2018 07:13), Max: 98.6 (06 Sep 2018 23:50)  HR: 68 (07 Sep 2018 07:13) (63 - 68)  BP: 131/77 (07 Sep 2018 07:13) (119/57 - 131/77)  BP(mean): --  RR: 16 (07 Sep 2018 07:13) (16 - 18)  SpO2: 98% (07 Sep 2018 07:13) (98% - 100%)      General:  Well developed, well nourished, alert and active, no pallor, NAD.  HEENT:    Normal appearance of conjunctiva, ears, nose, lips, oropharynx, and oral mucosa, anicteric.  Neck:  No masses, no asymmetry.  Lymph Nodes:  No lymphadenopathy.   Cardiovascular:  RRR normal S1/S2, no murmur.  Respiratory:  CTA B/L, normal respiratory effort.   Abdominal:   soft, no masses or tenderness, normoactive BS, NT/ND, no HSM.  Extremities:   No clubbing or cyanosis, normal capillary refill, no edema.   Skin:   No rash, jaundice, lesions, eczema.   Musculoskeletal:  No joint swelling, erythema or tenderness.   Neuro: No focal deficits.   Other:       LABS:                        15.1   8.3   )-----------( 340      ( 07 Sep 2018 06:47 )             46.7     09-07    142  |  108  |  12  ----------------------------<  90  3.8   |  26  |  0.94    Ca    9.2      07 Sep 2018 06:47  Phos  3.5     09-07  Mg     2.2     09-07            RADIOLOGY & ADDITIONAL TESTS:

## 2018-09-07 NOTE — PROGRESS NOTE ADULT - SUBJECTIVE AND OBJECTIVE BOX
INTERVAL HPI/OVERNIGHT EVENTS: I feel better . Mother and sister in room.   Vital Signs Last 24 Hrs  T(C): 36.8 (07 Sep 2018 07:13), Max: 37 (06 Sep 2018 23:50)  T(F): 98.3 (07 Sep 2018 07:13), Max: 98.6 (06 Sep 2018 23:50)  HR: 68 (07 Sep 2018 07:13) (63 - 68)  BP: 131/77 (07 Sep 2018 07:13) (119/57 - 131/77)  BP(mean): --  RR: 16 (07 Sep 2018 07:13) (16 - 18)  SpO2: 98% (07 Sep 2018 07:13) (98% - 100%)  I&O's Summary    MEDICATIONS  (STANDING):  ciprofloxacin   IVPB 400 milliGRAM(s) IV Intermittent every 12 hours  ergocalciferol 36288 Unit(s) Oral <User Schedule>  metroNIDAZOLE  IVPB 500 milliGRAM(s) IV Intermittent every 8 hours  potassium acid phosphate/sodium acid phosphate tablet (K-PHOS No. 2) 1 Tablet(s) Oral four times a day with meals    MEDICATIONS  (PRN):  acetaminophen   Tablet 650 milliGRAM(s) Oral every 6 hours PRN For Temp greater than 38 C (100.4 F)  acetaminophen   Tablet .. 650 milliGRAM(s) Oral once PRN Temp greater or equal to 38.5C (101.3F)  acetaminophen   Tablet. 650 milliGRAM(s) Oral every 6 hours PRN Mild Pain (1 - 3)  ketorolac   Injectable 15 milliGRAM(s) IV Push every 6 hours PRN Severe Pain (7 - 10)  ondansetron Injectable 4 milliGRAM(s) IV Push every 6 hours PRN Nausea and/or Vomiting  traMADol 25 milliGRAM(s) Oral every 6 hours PRN Moderate Pain (4 - 6)    LABS:                        15.1   8.3   )-----------( 340      ( 07 Sep 2018 06:47 )             46.7     09-07    142  |  108  |  12  ----------------------------<  90  3.8   |  26  |  0.94    Ca    9.2      07 Sep 2018 06:47  Phos  3.5     09-07  Mg     2.2     09-07          CAPILLARY BLOOD GLUCOSE              REVIEW OF SYSTEMS:  CONSTITUTIONAL: No fever, weight loss, or fatigue  EYES: No eye pain, visual disturbances, or discharge  ENMT:  No difficulty hearing, tinnitus, vertigo; No sinus or throat pain  NECK: No pain or stiffness  RESPIRATORY: No cough, wheezing, chills or hemoptysis; No shortness of breath  CARDIOVASCULAR: No chest pain, palpitations, dizziness, or leg swelling  GASTROINTESTINAL: No abdominal or epigastric pain. No nausea, vomiting, or hematemesis; No diarrhea or constipation. No melena or hematochezia.  GENITOURINARY: No dysuria, frequency, hematuria, or incontinence  NEUROLOGICAL: No headaches, memory loss, loss of strength, numbness, or tremors    Consultant(s) Notes Reviewed:  [x ] YES  [ ] NO    PHYSICAL EXAM:  GENERAL: NAD, well-groomed, well-developed, not in any distress ,  HEAD:  Atraumatic, Normocephalic  EYES: EOMI, PERRLA, conjunctiva and sclera clear  ENMT: No tonsillar erythema, exudates, or enlargement; Moist mucous membranes, Good dentition, No lesions  NECK: Supple, No JVD, Normal thyroid  NERVOUS SYSTEM:  Alert & Oriented X3, No focal deficit   CHEST/LUNG: Good air entry bilateral with no  rales, rhonchi, wheezing, or rubs  HEART: Regular rate and rhythm; No murmurs, rubs, or gallops  ABDOMEN: Soft, Nontender, Nondistended; Bowel sounds present  EXTREMITIES:  2+ Peripheral Pulses, No clubbing, cyanosis, or edema  SKIN: No rashes or lesions    Care Discussed with Consultants/Other Providers [ x] YES  [ ] NO

## 2018-09-07 NOTE — PROGRESS NOTE ADULT - ASSESSMENT
19 yo male with no PMH or PSH ,comes in with abdominal pain and fever at home. Patient complaint of high grade fever at home , with some chills. Also experienced generalized abdominal pain , 4/10 in intensity , with no radiation to back. Patient also reported nausea and 4 episodes of NBNP vomiting. Last bm was yest night , formed stools , no diarrhea , no blood in stools. No constipation. Denies any sick contacts / hospitalizations. Denies any h/o similar episode.      Problem/Plan - 1:  ·  Problem: Sepsis.  Plan: Present on admission. Resolved.  IV Abxs and S/P IVF.     Problem/Plan - 2:  ·  Problem: Acute colitis .  Plan: Tolerating regular diet . On Abxs for total 10 days . Will change to PO on DC. GI helping .  Needs outpt Colonoscopy.      Problem/Plan - 2:  ·  Problem: Prophylactic measure.  Plan: IMPROVE VTE Individual Risk Assessment; Ambulating       Disposition : DC planning home today.

## 2018-09-07 NOTE — DISCHARGE NOTE ADULT - VISION (WITH CORRECTIVE LENSES IF THE PATIENT USUALLY WEARS THEM):
wears eyeglasses/Normal vision: sees adequately in most situations; can see medication labels, newsprint

## 2018-09-07 NOTE — DISCHARGE NOTE ADULT - CARE PROVIDER_API CALL
Marco Antonio Pisano), Gastroenterology; Internal Medicine  [address] [place] [address]  Phone: [phone]  Fax: [phone]

## 2018-09-07 NOTE — DISCHARGE NOTE ADULT - MEDICATION SUMMARY - MEDICATIONS TO STOP TAKING
I will STOP taking the medications listed below when I get home from the hospital:    benztropine 1 mg oral tablet  -- 1 tab(s) by mouth once a day

## 2018-09-07 NOTE — PROGRESS NOTE ADULT - REASON FOR ADMISSION
fever, abdominal pain

## 2018-09-07 NOTE — DISCHARGE NOTE ADULT - HOSPITAL COURSE
Pt is 17 yo male with no PMH or PSH ,comes in with abdominal pain and fever at home. Patient complaint of high grade fever at home , with some chills. Also experienced generalized abdominal pain , 4/10 in intensity , with no radiation to back. Patient also reported nausea and 4 episodes of NBNP vomiting. Last bm was yest night , formed stools , no diarrhea , no blood in stools. No constipation. Denies any sick contacts / hospitalizations. Denies any h/o similar episode. Had outside food ( chinese ) , but on Saturday , nobody else ate same food. No recent travel.   In Ed , BP : 121/ 80 mm hg , Hr : 152 , Temp : 102.8 F  Code Sepsis was called in Ed, patient was given one dose of Zosyn , 2700ml fluid , after which temp and Hr normalized   cbc shows white count of 16 with left shift   bmp wnl , lactate wnl   CT A/P shows colitis of Ascending colon and Transverse Colon   Pt was started on IV antibiotics ciprofloxacin & metronidazole with IV fluids. Pt clinically improved and was started on diet as tolerated. Pt blood cultures & urine cultures has been negative until now. Pt inflammatory markers were negative. Pt fever has been improved and pt is recommended by Gastroenterologist to follow up out-patient at clinic.    Pt is being discharged home with antibiotics after discussing with attending on case.

## 2018-09-07 NOTE — DISCHARGE NOTE ADULT - PATIENT PORTAL LINK FT
You can access the [facility], offered by [facility], by registering with the following website: [email or web address]

## 2018-09-07 NOTE — DISCHARGE NOTE ADULT - PLAN OF CARE
Continue with your antibiotics & follow up with Dr. Juarez within 1 month You came in with fever & abdominal pain. You were found to have colitis and you were started on antibiotics. Please complete your antibiotics and follow up with Dr. Juarez at out-patient clinic at [address] [place] [address]. Your vitamin D levels were low. Please take the capsule once a week for 4 weeks and then get your levels rechecked in 1 month Your thyroid test done in hospital were not in normal range. You are advised to please follow up with your primary doctor and get your thyroid function tests repeated in 6 weeks

## 2018-09-09 LAB
CULTURE RESULTS: SIGNIFICANT CHANGE UP
CULTURE RESULTS: SIGNIFICANT CHANGE UP
SPECIMEN SOURCE: SIGNIFICANT CHANGE UP
SPECIMEN SOURCE: SIGNIFICANT CHANGE UP

## 2018-09-17 LAB — CALPROTECTIN STL-MCNT: 397 UG/G — HIGH (ref 0–120)

## 2018-10-09 ENCOUNTER — APPOINTMENT (OUTPATIENT)
Dept: GASTROENTEROLOGY | Facility: CLINIC | Age: 18
End: 2018-10-09
Payer: COMMERCIAL

## 2018-10-09 VITALS
TEMPERATURE: 98.9 F | HEART RATE: 88 BPM | WEIGHT: 215 LBS | DIASTOLIC BLOOD PRESSURE: 63 MMHG | OXYGEN SATURATION: 98 % | SYSTOLIC BLOOD PRESSURE: 137 MMHG | HEIGHT: 67 IN | RESPIRATION RATE: 16 BRPM | BODY MASS INDEX: 33.74 KG/M2

## 2018-10-09 PROCEDURE — 99203 OFFICE O/P NEW LOW 30 MIN: CPT

## 2019-04-14 ENCOUNTER — EMERGENCY (EMERGENCY)
Facility: HOSPITAL | Age: 19
LOS: 1 days | Discharge: HOME | End: 2019-04-14
Attending: STUDENT IN AN ORGANIZED HEALTH CARE EDUCATION/TRAINING PROGRAM
Payer: COMMERCIAL

## 2019-04-14 VITALS
HEART RATE: 87 BPM | DIASTOLIC BLOOD PRESSURE: 71 MMHG | SYSTOLIC BLOOD PRESSURE: 120 MMHG | TEMPERATURE: 99 F | OXYGEN SATURATION: 100 % | RESPIRATION RATE: 17 BRPM

## 2019-04-14 VITALS
HEIGHT: 67 IN | HEART RATE: 116 BPM | DIASTOLIC BLOOD PRESSURE: 75 MMHG | WEIGHT: 210.1 LBS | SYSTOLIC BLOOD PRESSURE: 122 MMHG | RESPIRATION RATE: 18 BRPM | TEMPERATURE: 100 F | OXYGEN SATURATION: 100 %

## 2019-04-14 LAB
ALBUMIN SERPL ELPH-MCNC: 3.9 G/DL — SIGNIFICANT CHANGE UP (ref 3.5–5)
ALP SERPL-CCNC: 110 U/L — SIGNIFICANT CHANGE UP (ref 40–120)
ALT FLD-CCNC: 68 U/L DA — HIGH (ref 10–60)
ANION GAP SERPL CALC-SCNC: 8 MMOL/L — SIGNIFICANT CHANGE UP (ref 5–17)
AST SERPL-CCNC: 49 U/L — HIGH (ref 10–40)
BASOPHILS # BLD AUTO: 0.01 K/UL — SIGNIFICANT CHANGE UP (ref 0–0.2)
BASOPHILS NFR BLD AUTO: 0.1 % — SIGNIFICANT CHANGE UP (ref 0–2)
BILIRUB SERPL-MCNC: 0.5 MG/DL — SIGNIFICANT CHANGE UP (ref 0.2–1.2)
BUN SERPL-MCNC: 17 MG/DL — SIGNIFICANT CHANGE UP (ref 7–18)
CALCIUM SERPL-MCNC: 8.4 MG/DL — SIGNIFICANT CHANGE UP (ref 8.4–10.5)
CHLORIDE SERPL-SCNC: 109 MMOL/L — HIGH (ref 96–108)
CO2 SERPL-SCNC: 23 MMOL/L — SIGNIFICANT CHANGE UP (ref 22–31)
CREAT SERPL-MCNC: 1.05 MG/DL — SIGNIFICANT CHANGE UP (ref 0.5–1.3)
EOSINOPHIL # BLD AUTO: 0.16 K/UL — SIGNIFICANT CHANGE UP (ref 0–0.5)
EOSINOPHIL NFR BLD AUTO: 1.2 % — SIGNIFICANT CHANGE UP (ref 0–6)
GLUCOSE SERPL-MCNC: 108 MG/DL — HIGH (ref 70–99)
HCT VFR BLD CALC: 44.2 % — SIGNIFICANT CHANGE UP (ref 39–50)
HGB BLD-MCNC: 14.8 G/DL — SIGNIFICANT CHANGE UP (ref 13–17)
IMM GRANULOCYTES NFR BLD AUTO: 0.4 % — SIGNIFICANT CHANGE UP (ref 0–1.5)
LIDOCAIN IGE QN: 63 U/L — LOW (ref 73–393)
LYMPHOCYTES # BLD AUTO: 0.8 K/UL — LOW (ref 1–3.3)
LYMPHOCYTES # BLD AUTO: 6 % — LOW (ref 13–44)
MCHC RBC-ENTMCNC: 28 PG — SIGNIFICANT CHANGE UP (ref 27–34)
MCHC RBC-ENTMCNC: 33.5 GM/DL — SIGNIFICANT CHANGE UP (ref 32–36)
MCV RBC AUTO: 83.7 FL — SIGNIFICANT CHANGE UP (ref 80–100)
MONOCYTES # BLD AUTO: 0.55 K/UL — SIGNIFICANT CHANGE UP (ref 0–0.9)
MONOCYTES NFR BLD AUTO: 4.1 % — SIGNIFICANT CHANGE UP (ref 2–14)
NEUTROPHILS # BLD AUTO: 11.83 K/UL — HIGH (ref 1.8–7.4)
NEUTROPHILS NFR BLD AUTO: 88.2 % — HIGH (ref 43–77)
NRBC # BLD: 0 /100 WBCS — SIGNIFICANT CHANGE UP (ref 0–0)
PLATELET # BLD AUTO: 314 K/UL — SIGNIFICANT CHANGE UP (ref 150–400)
POTASSIUM SERPL-MCNC: 4.1 MMOL/L — SIGNIFICANT CHANGE UP (ref 3.5–5.3)
POTASSIUM SERPL-SCNC: 4.1 MMOL/L — SIGNIFICANT CHANGE UP (ref 3.5–5.3)
PROT SERPL-MCNC: 8.2 G/DL — SIGNIFICANT CHANGE UP (ref 6–8.3)
RBC # BLD: 5.28 M/UL — SIGNIFICANT CHANGE UP (ref 4.2–5.8)
RBC # FLD: 14.4 % — SIGNIFICANT CHANGE UP (ref 10.3–14.5)
SODIUM SERPL-SCNC: 140 MMOL/L — SIGNIFICANT CHANGE UP (ref 135–145)
WBC # BLD: 13.4 K/UL — HIGH (ref 3.8–10.5)
WBC # FLD AUTO: 13.4 K/UL — HIGH (ref 3.8–10.5)

## 2019-04-14 PROCEDURE — 80053 COMPREHEN METABOLIC PANEL: CPT

## 2019-04-14 PROCEDURE — 96374 THER/PROPH/DIAG INJ IV PUSH: CPT

## 2019-04-14 PROCEDURE — 83690 ASSAY OF LIPASE: CPT

## 2019-04-14 PROCEDURE — 85027 COMPLETE CBC AUTOMATED: CPT

## 2019-04-14 PROCEDURE — 99284 EMERGENCY DEPT VISIT MOD MDM: CPT | Mod: 25

## 2019-04-14 PROCEDURE — 99284 EMERGENCY DEPT VISIT MOD MDM: CPT

## 2019-04-14 PROCEDURE — 96375 TX/PRO/DX INJ NEW DRUG ADDON: CPT

## 2019-04-14 PROCEDURE — 36415 COLL VENOUS BLD VENIPUNCTURE: CPT

## 2019-04-14 RX ORDER — FAMOTIDINE 10 MG/ML
20 INJECTION INTRAVENOUS ONCE
Qty: 0 | Refills: 0 | Status: COMPLETED | OUTPATIENT
Start: 2019-04-14 | End: 2019-04-14

## 2019-04-14 RX ORDER — ONDANSETRON 8 MG/1
4 TABLET, FILM COATED ORAL ONCE
Qty: 0 | Refills: 0 | Status: COMPLETED | OUTPATIENT
Start: 2019-04-14 | End: 2019-04-14

## 2019-04-14 RX ORDER — SODIUM CHLORIDE 9 MG/ML
2000 INJECTION INTRAMUSCULAR; INTRAVENOUS; SUBCUTANEOUS ONCE
Qty: 0 | Refills: 0 | Status: COMPLETED | OUTPATIENT
Start: 2019-04-14 | End: 2019-04-14

## 2019-04-14 RX ADMIN — Medication 30 MILLILITER(S): at 15:51

## 2019-04-14 RX ADMIN — FAMOTIDINE 20 MILLIGRAM(S): 10 INJECTION INTRAVENOUS at 15:52

## 2019-04-14 RX ADMIN — SODIUM CHLORIDE 2000 MILLILITER(S): 9 INJECTION INTRAMUSCULAR; INTRAVENOUS; SUBCUTANEOUS at 17:06

## 2019-04-14 RX ADMIN — SODIUM CHLORIDE 2000 MILLILITER(S): 9 INJECTION INTRAMUSCULAR; INTRAVENOUS; SUBCUTANEOUS at 15:52

## 2019-04-14 RX ADMIN — ONDANSETRON 4 MILLIGRAM(S): 8 TABLET, FILM COATED ORAL at 15:52

## 2019-04-14 NOTE — ED PROVIDER NOTE - PROGRESS NOTE DETAILS
patient abd soft, no ttp on re-examination. mild wbc likely 2/2 to gastroenteritis. vital improved. hr rechecked on manual palp after fluid. no vomioting. return precuation given

## 2019-04-14 NOTE — ED PROVIDER NOTE - CLINICAL SUMMARY MEDICAL DECISION MAKING FREE TEXT BOX
Patient presenting with nausea, vomiting, and abdominal pain. Obtain vital signs for tachycardia. Give fluids. Obtain labs, serial abdominal exam. Abdomen soft, non tender. No signs of appendicitis. Will reassess.

## 2019-04-14 NOTE — ED PROVIDER NOTE - OBJECTIVE STATEMENT
19 y/o M patient with a significant PMHx of hearing loss and no significant PSHx presents to the ED with epigastric pain, nausea, and vomiting. Patient endorses that his symptoms started today. Patient notes eating street food yesterday. Patient denies fever, RLQ pain, and any other complaints. NKDA.

## 2019-05-22 NOTE — H&P ADULT - NSHPSOCIALHISTORY_GEN_ALL_CORE
Occupational [address] [place] [address] CARE OCCUPATIONAL THERAPY EVALUATION    Lisa Fothergill, 6/20/1935, 2018/2018-A, 5/22/2019    Discharge Recommendation: Zeke Smith      History:  [place]:  RN Yoandy Burnett, Co-evaluation with PT Tosha  Restrictions: General Precautions, Fall Risk, TTWB Rt LE, External Fixator, Telemetry, Pulse Ox, BP cuff, Bed/chair alarm    Home Setup/Prior level of function:  Social/Functional History  Lives With: Alone  Type of Home: Facility (Long term resident at Henrico Doctors' Hospital—Parham Campus No smoking / alcohol / drugs

## 2020-11-04 NOTE — ED PROVIDER NOTE - GASTROINTESTINAL [+], MLM
ABDOMINAL PAIN/VOMITING Closure 2 Information: This tab is for additional flaps and grafts, including complex repair and grafts and complex repair and flaps. You can also specify a different location for the additional defect, if the location is the same you do not need to select a new one. We will insert the automated text for the repair you select below just as we do for solitary flaps and grafts. Please note that at this time if you select a location with a different insurance zone you will need to override the ICD10 and CPT if appropriate.

## 2022-01-01 NOTE — H&P ADULT - PROBLEM SELECTOR PLAN 2
Return to the ER for increased work of breathing characterized by but not limited to: 1. Flaring of the Nostrils, 2. Retractions of the ribs, 3. Increased belly breathing. If you see this please return to the ER immediately, otherwise please follow up with your pediatrician in 2-3 days. IMPROVE VTE Individual Risk Assessment          RISK                                                          Points  [  ] Previous VTE                                                3  [  ] Thrombophilia                                             2  [  ] Lower limb paralysis                                   2        (unable to hold up >15 seconds)    [  ] Current Cancer                                             2         (within 6 months)  [  ] Immobilization > 24 hrs                              1  [  ] ICU/CCU stay > 24 hours                             1  [  ] Age > 60                                                         1    IMPROVE VTE Score: 0   no indication for dvt ppx

## 2022-09-30 ENCOUNTER — INPATIENT (INPATIENT)
Facility: HOSPITAL | Age: 22
LOS: 4 days | Discharge: HOME | DRG: 896 | End: 2022-10-05
Attending: INTERNAL MEDICINE | Admitting: INTERNAL MEDICINE
Payer: COMMERCIAL

## 2022-09-30 VITALS
HEART RATE: 114 BPM | TEMPERATURE: 99 F | DIASTOLIC BLOOD PRESSURE: 91 MMHG | OXYGEN SATURATION: 97 % | RESPIRATION RATE: 16 BRPM | WEIGHT: 210.32 LBS | HEIGHT: 67 IN | SYSTOLIC BLOOD PRESSURE: 131 MMHG

## 2022-09-30 DIAGNOSIS — R41.82 ALTERED MENTAL STATUS, UNSPECIFIED: ICD-10-CM

## 2022-09-30 LAB
ALBUMIN SERPL ELPH-MCNC: 4 G/DL — SIGNIFICANT CHANGE UP (ref 3.5–5)
ALP SERPL-CCNC: 76 U/L — SIGNIFICANT CHANGE UP (ref 40–120)
ALT FLD-CCNC: 60 U/L DA — SIGNIFICANT CHANGE UP (ref 10–60)
AMPHET UR-MCNC: NEGATIVE — SIGNIFICANT CHANGE UP
ANION GAP SERPL CALC-SCNC: 8 MMOL/L — SIGNIFICANT CHANGE UP (ref 5–17)
APAP SERPL-MCNC: <2 UG/ML — LOW (ref 10–30)
APPEARANCE UR: CLEAR — SIGNIFICANT CHANGE UP
AST SERPL-CCNC: 81 U/L — HIGH (ref 10–40)
BACTERIA # UR AUTO: ABNORMAL /HPF
BARBITURATES UR SCN-MCNC: NEGATIVE — SIGNIFICANT CHANGE UP
BASOPHILS # BLD AUTO: 0.06 K/UL — SIGNIFICANT CHANGE UP (ref 0–0.2)
BASOPHILS NFR BLD AUTO: 0.5 % — SIGNIFICANT CHANGE UP (ref 0–2)
BENZODIAZ UR-MCNC: NEGATIVE — SIGNIFICANT CHANGE UP
BILIRUB SERPL-MCNC: 0.6 MG/DL — SIGNIFICANT CHANGE UP (ref 0.2–1.2)
BILIRUB UR-MCNC: NEGATIVE — SIGNIFICANT CHANGE UP
BUN SERPL-MCNC: 13 MG/DL — SIGNIFICANT CHANGE UP (ref 7–18)
CALCIUM SERPL-MCNC: 9.4 MG/DL — SIGNIFICANT CHANGE UP (ref 8.4–10.5)
CHLORIDE SERPL-SCNC: 105 MMOL/L — SIGNIFICANT CHANGE UP (ref 96–108)
CO2 SERPL-SCNC: 25 MMOL/L — SIGNIFICANT CHANGE UP (ref 22–31)
COCAINE METAB.OTHER UR-MCNC: NEGATIVE — SIGNIFICANT CHANGE UP
COLOR SPEC: YELLOW — SIGNIFICANT CHANGE UP
CREAT SERPL-MCNC: 0.84 MG/DL — SIGNIFICANT CHANGE UP (ref 0.5–1.3)
DIFF PNL FLD: ABNORMAL
EGFR: 125 ML/MIN/1.73M2 — SIGNIFICANT CHANGE UP
EOSINOPHIL # BLD AUTO: 0.08 K/UL — SIGNIFICANT CHANGE UP (ref 0–0.5)
EOSINOPHIL NFR BLD AUTO: 0.7 % — SIGNIFICANT CHANGE UP (ref 0–6)
EPI CELLS # UR: SIGNIFICANT CHANGE UP /HPF
ETHANOL SERPL-MCNC: <3 MG/DL — SIGNIFICANT CHANGE UP (ref 0–10)
GLUCOSE SERPL-MCNC: 95 MG/DL — SIGNIFICANT CHANGE UP (ref 70–99)
GLUCOSE UR QL: NEGATIVE — SIGNIFICANT CHANGE UP
HCT VFR BLD CALC: 45 % — SIGNIFICANT CHANGE UP (ref 39–50)
HGB BLD-MCNC: 15 G/DL — SIGNIFICANT CHANGE UP (ref 13–17)
IMM GRANULOCYTES NFR BLD AUTO: 0.3 % — SIGNIFICANT CHANGE UP (ref 0–0.9)
KETONES UR-MCNC: ABNORMAL
LEUKOCYTE ESTERASE UR-ACNC: NEGATIVE — SIGNIFICANT CHANGE UP
LYMPHOCYTES # BLD AUTO: 1.64 K/UL — SIGNIFICANT CHANGE UP (ref 1–3.3)
LYMPHOCYTES # BLD AUTO: 14.5 % — SIGNIFICANT CHANGE UP (ref 13–44)
MCHC RBC-ENTMCNC: 28.2 PG — SIGNIFICANT CHANGE UP (ref 27–34)
MCHC RBC-ENTMCNC: 33.3 GM/DL — SIGNIFICANT CHANGE UP (ref 32–36)
MCV RBC AUTO: 84.7 FL — SIGNIFICANT CHANGE UP (ref 80–100)
METHADONE UR-MCNC: NEGATIVE — SIGNIFICANT CHANGE UP
MONOCYTES # BLD AUTO: 0.68 K/UL — SIGNIFICANT CHANGE UP (ref 0–0.9)
MONOCYTES NFR BLD AUTO: 6 % — SIGNIFICANT CHANGE UP (ref 2–14)
NEUTROPHILS # BLD AUTO: 8.84 K/UL — HIGH (ref 1.8–7.4)
NEUTROPHILS NFR BLD AUTO: 78 % — HIGH (ref 43–77)
NITRITE UR-MCNC: NEGATIVE — SIGNIFICANT CHANGE UP
NRBC # BLD: 0 /100 WBCS — SIGNIFICANT CHANGE UP (ref 0–0)
OPIATES UR-MCNC: NEGATIVE — SIGNIFICANT CHANGE UP
PCP SPEC-MCNC: SIGNIFICANT CHANGE UP
PCP UR-MCNC: NEGATIVE — SIGNIFICANT CHANGE UP
PH UR: 5 — SIGNIFICANT CHANGE UP (ref 5–8)
PLATELET # BLD AUTO: 322 K/UL — SIGNIFICANT CHANGE UP (ref 150–400)
POTASSIUM SERPL-MCNC: 3.6 MMOL/L — SIGNIFICANT CHANGE UP (ref 3.5–5.3)
POTASSIUM SERPL-SCNC: 3.6 MMOL/L — SIGNIFICANT CHANGE UP (ref 3.5–5.3)
PROT SERPL-MCNC: 8.3 G/DL — SIGNIFICANT CHANGE UP (ref 6–8.3)
PROT UR-MCNC: 30 MG/DL
RBC # BLD: 5.31 M/UL — SIGNIFICANT CHANGE UP (ref 4.2–5.8)
RBC # FLD: 14.1 % — SIGNIFICANT CHANGE UP (ref 10.3–14.5)
RBC CASTS # UR COMP ASSIST: SIGNIFICANT CHANGE UP /HPF (ref 0–2)
SALICYLATES SERPL-MCNC: <1.7 MG/DL — LOW (ref 2.8–20)
SARS-COV-2 RNA SPEC QL NAA+PROBE: SIGNIFICANT CHANGE UP
SODIUM SERPL-SCNC: 138 MMOL/L — SIGNIFICANT CHANGE UP (ref 135–145)
SP GR SPEC: 1.02 — SIGNIFICANT CHANGE UP (ref 1.01–1.02)
THC UR QL: POSITIVE
TSH SERPL-MCNC: 0.57 UU/ML — SIGNIFICANT CHANGE UP (ref 0.34–4.82)
UROBILINOGEN FLD QL: NEGATIVE — SIGNIFICANT CHANGE UP
WBC # BLD: 11.33 K/UL — HIGH (ref 3.8–10.5)
WBC # FLD AUTO: 11.33 K/UL — HIGH (ref 3.8–10.5)
WBC UR QL: SIGNIFICANT CHANGE UP /HPF (ref 0–5)

## 2022-09-30 PROCEDURE — 99285 EMERGENCY DEPT VISIT HI MDM: CPT

## 2022-09-30 PROCEDURE — 70450 CT HEAD/BRAIN W/O DYE: CPT | Mod: 26,MA

## 2022-09-30 PROCEDURE — 71045 X-RAY EXAM CHEST 1 VIEW: CPT | Mod: 26

## 2022-09-30 RX ORDER — HALOPERIDOL DECANOATE 100 MG/ML
5 INJECTION INTRAMUSCULAR ONCE
Refills: 0 | Status: DISCONTINUED | OUTPATIENT
Start: 2022-09-30 | End: 2022-10-03

## 2022-09-30 RX ORDER — SODIUM CHLORIDE 9 MG/ML
1000 INJECTION INTRAMUSCULAR; INTRAVENOUS; SUBCUTANEOUS
Refills: 0 | Status: COMPLETED | OUTPATIENT
Start: 2022-09-30 | End: 2022-09-30

## 2022-09-30 RX ADMIN — Medication 2 MILLIGRAM(S): at 18:26

## 2022-09-30 NOTE — ED PROVIDER NOTE - CLINICAL SUMMARY MEDICAL DECISION MAKING FREE TEXT BOX
Patient with altered mental status. May suggest effect of drug use like marijuana. Also may consider psychiatric illness such as bipolar. Will do labs, ct head, toxication screen and reassess. 21 yo male with altered mental status. May suggest effect of drug use such as marijuana. Also consideration for manic episode among other causes. Will perform labs, ct head, toxicology screen and reassess.

## 2022-09-30 NOTE — ED PROVIDER NOTE - OBJECTIVE STATEMENT
22 year old male with no pmhx presents with altered mental status. As per sister, patient arrived home this morning around 6am with rambling conversation and confusion. Last seen yesterday afternoon. Patient endorses occasional Marijuana use. Denies other drugs or trauma. Per sister, patient had similar episode 5-6 years ago which was attributed to drug use. but not sure which drug. Otherwise no fever, chills, vomiting, diarrhea. Patient not on any medications.   NKDA. 22 year old male with no pmhx presents with altered mental status. As per sister, patient arrived home this morning around 6am with rambling speech and confusion. Last seen by family yesterday afternoon. Patient endorses occasional Marijuana use. Denies other drugs or trauma. Per sister, patient had similar episode 5-6 years ago which was attributed to drug use, but not sure which drug. Otherwise no fever, chills, vomiting, diarrhea. Patient not on any medications.   NKDA.

## 2022-09-30 NOTE — ED PROVIDER NOTE - NSICDXPASTMEDICALHX_GEN_ALL_CORE_FT
PAST MEDICAL HISTORY:  Hearing loss Right-sided hearing loss at the age of 4. Prescribed a hearing aid.

## 2022-09-30 NOTE — ED PROVIDER NOTE - NSICDXFAMILYHX_GEN_ALL_CORE_FT
FAMILY HISTORY:  Family history of hypercholesterolemia    Sibling  Still living? Unknown  Family history of ulcerative colitis, Age at diagnosis: Age Unknown

## 2022-10-01 DIAGNOSIS — Z29.9 ENCOUNTER FOR PROPHYLACTIC MEASURES, UNSPECIFIED: ICD-10-CM

## 2022-10-01 DIAGNOSIS — G92.8 OTHER TOXIC ENCEPHALOPATHY: ICD-10-CM

## 2022-10-01 LAB
ALBUMIN SERPL ELPH-MCNC: 3.7 G/DL — SIGNIFICANT CHANGE UP (ref 3.5–5)
ALP SERPL-CCNC: 85 U/L — SIGNIFICANT CHANGE UP (ref 40–120)
ALT FLD-CCNC: 63 U/L DA — HIGH (ref 10–60)
ANION GAP SERPL CALC-SCNC: 7 MMOL/L — SIGNIFICANT CHANGE UP (ref 5–17)
AST SERPL-CCNC: 80 U/L — HIGH (ref 10–40)
BILIRUB SERPL-MCNC: 0.7 MG/DL — SIGNIFICANT CHANGE UP (ref 0.2–1.2)
BUN SERPL-MCNC: 12 MG/DL — SIGNIFICANT CHANGE UP (ref 7–18)
CALCIUM SERPL-MCNC: 9.1 MG/DL — SIGNIFICANT CHANGE UP (ref 8.4–10.5)
CHLORIDE SERPL-SCNC: 107 MMOL/L — SIGNIFICANT CHANGE UP (ref 96–108)
CO2 SERPL-SCNC: 22 MMOL/L — SIGNIFICANT CHANGE UP (ref 22–31)
CREAT SERPL-MCNC: 0.91 MG/DL — SIGNIFICANT CHANGE UP (ref 0.5–1.3)
EGFR: 121 ML/MIN/1.73M2 — SIGNIFICANT CHANGE UP
GLUCOSE SERPL-MCNC: 90 MG/DL — SIGNIFICANT CHANGE UP (ref 70–99)
HCT VFR BLD CALC: 47.5 % — SIGNIFICANT CHANGE UP (ref 39–50)
HGB BLD-MCNC: 15.2 G/DL — SIGNIFICANT CHANGE UP (ref 13–17)
MAGNESIUM SERPL-MCNC: 2.2 MG/DL — SIGNIFICANT CHANGE UP (ref 1.6–2.6)
MCHC RBC-ENTMCNC: 28.1 PG — SIGNIFICANT CHANGE UP (ref 27–34)
MCHC RBC-ENTMCNC: 32 GM/DL — SIGNIFICANT CHANGE UP (ref 32–36)
MCV RBC AUTO: 87.8 FL — SIGNIFICANT CHANGE UP (ref 80–100)
NRBC # BLD: 0 /100 WBCS — SIGNIFICANT CHANGE UP (ref 0–0)
PHOSPHATE SERPL-MCNC: 3.5 MG/DL — SIGNIFICANT CHANGE UP (ref 2.5–4.5)
PLATELET # BLD AUTO: 305 K/UL — SIGNIFICANT CHANGE UP (ref 150–400)
POTASSIUM SERPL-MCNC: 4.3 MMOL/L — SIGNIFICANT CHANGE UP (ref 3.5–5.3)
POTASSIUM SERPL-SCNC: 4.3 MMOL/L — SIGNIFICANT CHANGE UP (ref 3.5–5.3)
PROT SERPL-MCNC: 8.4 G/DL — HIGH (ref 6–8.3)
RBC # BLD: 5.41 M/UL — SIGNIFICANT CHANGE UP (ref 4.2–5.8)
RBC # FLD: 14.5 % — SIGNIFICANT CHANGE UP (ref 10.3–14.5)
SODIUM SERPL-SCNC: 136 MMOL/L — SIGNIFICANT CHANGE UP (ref 135–145)
WBC # BLD: 11.82 K/UL — HIGH (ref 3.8–10.5)
WBC # FLD AUTO: 11.82 K/UL — HIGH (ref 3.8–10.5)

## 2022-10-01 PROCEDURE — 99222 1ST HOSP IP/OBS MODERATE 55: CPT

## 2022-10-01 RX ORDER — LANOLIN ALCOHOL/MO/W.PET/CERES
5 CREAM (GRAM) TOPICAL AT BEDTIME
Refills: 0 | Status: DISCONTINUED | OUTPATIENT
Start: 2022-10-01 | End: 2022-10-05

## 2022-10-01 RX ORDER — ENOXAPARIN SODIUM 100 MG/ML
40 INJECTION SUBCUTANEOUS EVERY 24 HOURS
Refills: 0 | Status: DISCONTINUED | OUTPATIENT
Start: 2022-10-01 | End: 2022-10-05

## 2022-10-01 RX ORDER — SODIUM CHLORIDE 9 MG/ML
1000 INJECTION INTRAMUSCULAR; INTRAVENOUS; SUBCUTANEOUS
Refills: 0 | Status: DISCONTINUED | OUTPATIENT
Start: 2022-10-01 | End: 2022-10-05

## 2022-10-01 RX ADMIN — Medication 5 MILLIGRAM(S): at 22:27

## 2022-10-01 RX ADMIN — SODIUM CHLORIDE 1000 MILLILITER(S): 9 INJECTION INTRAMUSCULAR; INTRAVENOUS; SUBCUTANEOUS at 00:36

## 2022-10-01 RX ADMIN — SODIUM CHLORIDE 100 MILLILITER(S): 9 INJECTION INTRAMUSCULAR; INTRAVENOUS; SUBCUTANEOUS at 12:02

## 2022-10-01 RX ADMIN — ENOXAPARIN SODIUM 40 MILLIGRAM(S): 100 INJECTION SUBCUTANEOUS at 12:01

## 2022-10-01 RX ADMIN — SODIUM CHLORIDE 1000 MILLILITER(S): 9 INJECTION INTRAMUSCULAR; INTRAVENOUS; SUBCUTANEOUS at 06:23

## 2022-10-01 NOTE — CONSULT NOTE ADULT - SUBJECTIVE AND OBJECTIVE BOX
Patient is a 22y old  Male who presents with a chief complaint of Altered Mental Status 2/2 Cannibis use (01 Oct 2022 02:10)      HPI:  Patient is a 22 year old male with hx of bilateral hearing loss at birth, presents with altered mental status. Patient is accompanied by his sister. As per sister, patient arrived home this morning around 6am with rambling speech and confusion. Last seen by family yesterday afternoon. Patient states that he had a similar episode 5-6 years ago after using marijuana.   Patient endorses occasional Marijuana use, and states that he smoked 2 vape cartridges early Friday morning. Denies other drugs or trauma.  Denies chest pain, palpitations or shortness of breath. Denies numbness, tingling, weakness. Denies fevers or chills. Denies recent travel, denies neck pain.     PAST MEDICAL & SURGICAL HISTORY:  Hearing loss  Right-sided hearing loss at the age of 4. Prescribed a hearing aid.      No significant past surgical history          FAMILY HISTORY:  Family history of hypercholesterolemia    Family history of ulcerative colitis (Sibling)          Social Hx:  Nonsmoker, no drug or alcohol use    MEDICATIONS  (STANDING):  enoxaparin Injectable 40 milliGRAM(s) SubCutaneous every 24 hours  haloperidol    Injectable 5 milliGRAM(s) IntraMuscular Once  sodium chloride 0.9%. 1000 milliLiter(s) (100 mL/Hr) IV Continuous <Continuous>       Allergies    No Known Allergies    Intolerances        ROS: Pertinent positives in HPI, all other ROS were reviewed and are negative.      Vital Signs Last 24 Hrs  T(C): 36.6 (01 Oct 2022 05:02), Max: 37.7 (30 Sep 2022 20:08)  T(F): 97.8 (01 Oct 2022 05:02), Max: 99.9 (30 Sep 2022 20:08)  HR: 81 (01 Oct 2022 05:02) (81 - 114)  BP: 128/82 (01 Oct 2022 05:02) (120/76 - 136/75)  BP(mean): --  RR: 16 (01 Oct 2022 05:02) (16 - 18)  SpO2: 100% (01 Oct 2022 05:02) (97% - 100%)    Parameters below as of 01 Oct 2022 05:02  Patient On (Oxygen Delivery Method): room air            Constitutional: awake and alert.  HEENT: PERRLA, EOMI,   Neck: Supple.  Respiratory: Breath sounds are clear bilaterally  Cardiovascular: S1 and S2, regular rhythm  Gastrointestinal: soft, nontender  Extremities:  no edema  Vascular: Carotid Bruit - no  Musculoskeletal: no joint swelling/tenderness, no abnormal movements  Skin: No rashes    Neurological exam:  HF: A x O x 3. Appropriately interactive, normal affect. Speech fluent, No Aphasia or paraphasic errors. Naming /repetition intact   CN: MIKA, EOMI, VFF, facial sensation normal, no NLFD, tongue midline, Palate moves equally, SCM equal bilaterally  Motor: No pronator drift, Strength 5/5 in all 4 ext, normal bulk and tone, no tremor, rigidity or bradykinesia.    Sens: Intact to light touch / PP/ VS/ JS    Reflexes: Symmetric and normal . BJ 2+, BR 2+, KJ 2+, AJ 2+, downgoing toes b/l  Coord:  No FNFA, dysmetria, CARISA intact   Gait/Balance: Normal/Cannot test    NIHSS: 0          Labs:                        15.2   11.82 )-----------( 305      ( 01 Oct 2022 04:55 )             47.5     10-01    136  |  107  |  12  ----------------------------<  90  4.3   |  22  |  0.91    Ca    9.1      01 Oct 2022 04:55  Phos  3.5     10-01  Mg     2.2     10-01    TPro  8.4<H>  /  Alb  3.7  /  TBili  0.7  /  DBili  x   /  AST  80<H>  /  ALT  63<H>  /  AlkPhos  85  10-01            Radiology report:  - CT head:  < from: CT Head No Cont (09.30.22 @ 20:48) >    ACC: [id number] EXAM:  CT BRAIN                          PROCEDURE DATE:  09/30/2022          INTERPRETATION:  CLINICAL INFORMATION:  AMS    TECHNIQUE:  Axial CT images were acquired through the head.  Intravenous contrast: None  Two-dimensional reformats were generated.    COMPARISON STUDY: MRI brain 7/14/2014.    FINDINGS:    There is no CT evidence of acute intracranial hemorrhage, mass effect,   midline shift, or acute, large territorial infarct.  The ventricles and   sulci are normal in sizeand configuration. The basal cisterns are patent.    The mastoid air cells and middle ear cavities are grossly clear. There is   no significant paranasal sinus mucosal thickening.    The calvarium and skull base are grossly intact.    IMPRESSION:  Noacute intracranial hemorrhage or mass effect.    --- End of Report ---            AVILA SHAIKH MD; Attending Radiologist  This document has been electronically signed. Sep 30 2022  9:14PM    < end of copied text >   Patient is a 22y old  Male who presents with a chief complaint of Altered Mental Status 2/2 Cannibis use (01 Oct 2022 02:10)      HPI:   22 year old male with hx of bilateral hearing loss at birth, presents with altered mental status. His mother is with him today. According to his sister, he arrived home yesterday in the morning around 6am with rambling speech and confusion. Last seen by family the day before, in the  afternoon. His mother remembers a similar episode when he was 14 years old after using marijuana.  This time, too, he admits he smoked 2 vape cartridges early Friday morning. Denies other drugs or trauma.  Denies chest pain, palpitations or shortness of breath. Denies numbness, tingling, weakness. Denies fevers or chills. Denies recent travel, denies neck pain.   His mother is concerned about his excessive talking that tires everyone around    PAST MEDICAL & SURGICAL HISTORY:  Hearing loss  Right-sided hearing loss at the age of 4. Prescribed a hearing aid.    No significant past surgical history    FAMILY HISTORY:  Family history of hypercholesterolemia  Family history of ulcerative colitis (Sibling)     Social Hx:  Nonsmoker, no drug or alcohol use    MEDICATIONS  (STANDING):  enoxaparin Injectable 40 milliGRAM(s) SubCutaneous every 24 hours  haloperidol    Injectable 5 milliGRAM(s) IntraMuscular Once  sodium chloride 0.9%. 1000 milliLiter(s) (100 mL/Hr) IV Continuous <Continuous>       Allergies    No Known Allergies    Intolerances    ROS: Pertinent positives in HPI, all other ROS were reviewed and are negative.      Vital Signs Last 24 Hrs  T(C): 36.6 (01 Oct 2022 05:02), Max: 37.7 (30 Sep 2022 20:08)  T(F): 97.8 (01 Oct 2022 05:02), Max: 99.9 (30 Sep 2022 20:08)  HR: 81 (01 Oct 2022 05:02) (81 - 114)  BP: 128/82 (01 Oct 2022 05:02) (120/76 - 136/75)  BP(mean): --  RR: 16 (01 Oct 2022 05:02) (16 - 18)  SpO2: 100% (01 Oct 2022 05:02) (97% - 100%)    Parameters below as of 01 Oct 2022 05:02  Patient On (Oxygen Delivery Method): room air      Constitutional: awake and alert.  HEENT: PERRLA, EOMI,   Neck: Supple.  Respiratory: Breath sounds are clear bilaterally  Cardiovascular: S1 and S2, regular rhythm  Gastrointestinal: soft, nontender  Extremities:  no edema  Vascular: Carotid Bruit - no  Musculoskeletal: no joint swelling/tenderness, no abnormal movements  Skin: No rashes    Neurological exam:  HF: A x O x 3. Appropriately interactive, normal affect. Speech fluent, Pressure of speech and hyperactive; No Aphasia or paraphasic errors. Naming /repetition intact   CN: MIAK, EOMI, VFF, facial sensation normal, no NLFD, tongue midline, Palate moves equally, SCM equal bilaterally  Motor: No pronator drift, Strength 5/5 in all 4 ext, normal bulk and tone, no tremor, rigidity or bradykinesia.    Sens: Intact to light touch / PP/ VS/ JS    Reflexes: Symmetric and normal . BJ 2+, BR 2+, KJ 2+, AJ 2+, downgoing toes b/l  Coord:  No FNFA, dysmetria, CARISA intact   Gait/Balance: Normal test    NIHSS: 0          Labs:                        15.2   11.82 )-----------( 305      ( 01 Oct 2022 04:55 )             47.5     10-01    136  |  107  |  12  ----------------------------<  90  4.3   |  22  |  0.91    Ca    9.1      01 Oct 2022 04:55  Phos  3.5     10-01  Mg     2.2     10-01    TPro  8.4<H>  /  Alb  3.7  /  TBili  0.7  /  DBili  x   /  AST  80<H>  /  ALT  63<H>  /  AlkPhos  85  10-01            Radiology report:  - CT head:  < from: CT Head No Cont (09.30.22 @ 20:48) >    ACC: [id number] EXAM:  CT BRAIN                          PROCEDURE DATE:  09/30/2022          INTERPRETATION:  CLINICAL INFORMATION:  AMS    TECHNIQUE:  Axial CT images were acquired through the head.  Intravenous contrast: None  Two-dimensional reformats were generated.    COMPARISON STUDY: MRI brain 7/14/2014.    FINDINGS:    There is no CT evidence of acute intracranial hemorrhage, mass effect,   midline shift, or acute, large territorial infarct.  The ventricles and   sulci are normal in sizeand configuration. The basal cisterns are patent.    The mastoid air cells and middle ear cavities are grossly clear. There is   no significant paranasal sinus mucosal thickening.    The calvarium and skull base are grossly intact.    IMPRESSION:  Noacute intracranial hemorrhage or mass effect.    --- End of Report ---            AVILA SHAIKH MD; Attending Radiologist  This document has been electronically signed. Sep 30 2022  9:14PM    < end of copied text >

## 2022-10-01 NOTE — PATIENT PROFILE ADULT - FALL HARM RISK - RISK INTERVENTIONS
Assistance OOB with selected safe patient handling equipment/Assistance with ambulation/Communicate Fall Risk and Risk Factors to all staff, patient, and family/Discuss with provider need for PT consult/Monitor for mental status changes/Monitor gait and stability/Move patient closer to nurses' station/Reinforce activity limits and safety measures with patient and family/Reorient to person, place and time as needed/Toileting schedule using arm’s reach rule for commode and bathroom/Use of alarms - bed, chair and/or voice tab/Visual Cue: Yellow wristband/Bed in lowest position, wheels locked, appropriate side rails in place/Call bell, personal items and telephone in reach/Instruct patient to call for assistance before getting out of bed or chair/Non-slip footwear when patient is out of bed/[place] to call system/Physically safe environment - no spills, clutter or unnecessary equipment/Purposeful Proactive Rounding/Room/bathroom lighting operational, light cord in reach

## 2022-10-01 NOTE — H&P ADULT - ASSESSMENT
Patient is a 22 year old male with hx of bilateral hearing loss at birth, presents with altered mental status. Upon evaluation on the ED, patient found to be afebrile and hemodynamically stable. Patient noted to be disoriented, CT head noted to be negative, UTox noted to be positive cannabis. Patient admitted to medicine for acute toxic encephalopathy 2/2 Cannabis use.

## 2022-10-01 NOTE — H&P ADULT - PROBLEM SELECTOR PLAN 1
- patient noted to have altered mental status  - CT head, electrolytes normal  - PE showing no focal neurological deficits  - admit to medicine  - Utox noted to be positive for cannabis   - neuro checks  - fall checks - patient noted to have altered mental status  - CT head, electrolytes normal  - PE showing no focal neurological deficits  - admit to medicine  - Utox noted to be positive for cannabis   - neuro checks  - fall checks  - Neurology Dr. Jackman consulted

## 2022-10-01 NOTE — H&P ADULT - NSHPPHYSICALEXAM_GEN_ALL_CORE
PHYSICAL EXAM:  GENERAL: NAD, speaks in full sentences, no signs of respiratory distress  HEAD:  Atraumatic, Normocephalic  EYES: EOMI, PERRLA, conjunctiva and sclera clear  NECK: Supple, No JVD, no nuchal rigidity   CHEST/LUNG: Clear to auscultation bilaterally; No wheeze; No crackles; No accessory muscles used  HEART: Regular rate and rhythm; No murmurs; gallops or rubs   ABDOMEN: Soft, Nontender, Nondistended; Bowel sounds present; No guarding  EXTREMITIES:  2+ Peripheral Pulses, No cyanosis or edema  PSYCH: AAOx3  NEUROLOGY: CN II-XII intact b/l 5/5 strength intact b/l in UE and LE, sensation intact   SKIN: No rashes or lesions

## 2022-10-01 NOTE — ED ADULT NURSE NOTE - ED STAT RN HANDOFF DETAILS
PT received from KOLTON Hernandez, pt is stable and safe condition, awaiting for transport to floor bed

## 2022-10-01 NOTE — ED ADULT NURSE NOTE - OBJECTIVE STATEMENT
As per the sister of the pt, c/o confusion and visual hallucinations s/p smoking marijuana and going out last night. No obvious traumas noted. Pt denies all other symptoms.

## 2022-10-01 NOTE — CHART NOTE - NSCHARTNOTEFT_GEN_A_CORE
EVENT: Notified by RN, pt with complaints of insomnia     HPI:  22 year old male with hx of bilateral hearing loss at birth, presents with altered mental status. Upon evaluation on the ED, patient found to be afebrile and hemodynamically stable. Patient noted to be disoriented, CT head noted to be negative, Urine tox noted to be positive cannabis. Patient admitted to medicine for acute toxic encephalopathy 2/2 Cannabis use.     SUBJECTIVE: Pt is A&Ox3, with reports of difficulty falling asleep and is requesting medication     OBJECTIVE:  Vital Signs Last 24 Hrs  T(C): 36.7 (01 Oct 2022 20:34), Max: 36.7 (30 Sep 2022 23:57)  T(F): 98.1 (01 Oct 2022 20:34), Max: 98.1 (30 Sep 2022 23:57)  HR: 81 (01 Oct 2022 20:34) (81 - 98)  BP: 135/73 (01 Oct 2022 20:34) (120/76 - 135/73)  BP(mean): --  RR: 17 (01 Oct 2022 20:34) (16 - 18)  SpO2: 100% (01 Oct 2022 20:34) (96% - 100%)    Parameters below as of 01 Oct 2022 20:34  Patient On (Oxygen Delivery Method): room air      PHYSICAL EXAM:  Neuro: Awake and alert, oriented to person, place, and time  Cardiovascular: + S1, S2, no murmurs, rubs, or bruits  Respiratory: clear to auscultation bilaterally with good air entry   GI: Abdomen soft, non-tender, bowel sounds present   : Non distended;   Skin: warm and dry; no rash      LABS:                        15.2   11.82 )-----------( 305      ( 01 Oct 2022 04:55 )             47.5     10-01    136  |  107  |  12  ----------------------------<  90  4.3   |  22  |  0.91    Ca    9.1      01 Oct 2022 04:55  Phos  3.5     10-01  Mg     2.2     10-01    TPro  8.4<H>  /  Alb  3.7  /  TBili  0.7  /  DBili  x   /  AST  80<H>  /  ALT  63<H>  /  AlkPhos  85  10-01        EKG:   IMAGING:    ASSESSMENT/PROBLEM: Insomnia possibly due to environmental factors     PLAN:     -Melatonin 5 mg PO x 1 dose  -Reduce environmental factors to promote sleep and relaxation  -Continue current/supportive measures    FOLLOW UP / RESULT:     -Monitor effectiveness of sleep aid

## 2022-10-01 NOTE — H&P ADULT - HISTORY OF PRESENT ILLNESS
Patient is a 22 year old male with no pmhx presents with altered mental status. As per sister, patient arrived home this morning around 6am with rambling speech and confusion. Last seen by family yesterday afternoon. Patient states that he had a similiar episode  Patient endorses occasional Marijuana use. Denies other drugs or trauma. Per sister, patient had similar episode 5-6 years ago which was attributed to drug use, but not sure which drug. Otherwise no fever, chills, vomiting, diarrhea. Patient not on any medications. Patient is a 22 year old male with hx of bilateral hearing loss at birth, presents with altered mental status. Patient is accompanied by his sister. As per sister, patient arrived home this morning around 6am with rambling speech and confusion. Last seen by family yesterday afternoon. Patient states that he had a similar episode 5-6 years ago after using marijuana.   Patient endorses occasional Marijuana use, and states that he smoked 2 vape cartridges early Friday morning. Denies other drugs or trauma.  Denies chest pain, palpitations or shortness of breath. Denies numbness, tingling, weakness. Denies fevers or chills. Denies recent travel, denies neck pain.

## 2022-10-01 NOTE — H&P ADULT - NSHPREVIEWOFSYSTEMS_GEN_ALL_CORE
CONSTITUTIONAL: No generalized weakness or appetite  No fever, weight loss, or fatigue  EYES: No eye pain, visual disturbances, or discharge  ENT:  No difficulty hearing, tinnitus, vertigo; No sinus or throat pain  NECK: No pain or stiffness  RESPIRATORY: No cough, wheezing, chills or hemoptysis; No Shortness of Breath  CARDIOVASCULAR: No chest pain, palpitations, passing out, dizziness, or leg swelling  GASTROINTESTINAL: No abdominal or epigastric pain. No nausea, vomiting, or hematemesis; No diarrhea or constipation. No melena or hematochezia.  GENITOURINARY: No dysuria, frequency, hematuria, or incontinence  NEUROLOGICAL: No headaches, memory loss, loss of strength, numbness, or tremors  SKIN: No skin lesions   LYMPH Nodes: No enlarged glands  ENDOCRINE: No heat or cold intolerance; No hair loss  MUSCULOSKELETAL: No joint pain or swelling; No muscle, back, No extremity pain  PSYCHIATRIC: No depression, anxiety, mood swings, or difficulty sleeping  HEME/LYMPH: No easy bruising, or bleeding gums  ALLERGY AND IMMUNOLOGIC: No hives or eczema

## 2022-10-01 NOTE — CONSULT NOTE ADULT - SUBJECTIVE AND OBJECTIVE BOX
Patient is a 22y old  Male who presents with a chief complaint of Altered Mental Status 2/2 Cannibis use (01 Oct 2022 10:33)    History of Present Illness:  Reason for Admission: Altered Mental Status 2/2 Cannibis use  History of Present Illness:   Patient is a 22 year old male with hx of bilateral hearing loss at birth, presents with altered mental status. Patient is accompanied by his sister. As per sister, patient arrived home this morning around 6am with rambling speech and confusion. Last seen by family yesterday afternoon. Patient states that he had a similar episode 5-6 years ago after using marijuana.   Patient endorses occasional Marijuana use, and states that he smoked 2 vape cartridges early Friday morning. Denies other drugs or trauma.  Denies chest pain, palpitations or shortness of breath. Denies numbness    INTERVAL HPI/OVERNIGHT EVENTS:  T(C): 36.6 (10-01-22 @ 05:02), Max: 37.7 (22 @ 20:08)  HR: 81 (10-01-22 @ 05:02) (81 - 114)  BP: 128/82 (10-01-22 @ 05:02) (120/76 - 136/75)  RR: 16 (10-01-22 @ 05:02) (16 - 18)  SpO2: 100% (10-01-22 @ 05:02) (97% - 100%)  Wt(kg): --  I&O's Summary      PAST MEDICAL & SURGICAL HISTORY:  Hearing loss  Right-sided hearing loss at the age of 4. Prescribed a hearing aid.      No significant past surgical history          SOCIAL HISTORY  Alcohol:  Tobacco:  Illicit substance use:      FAMILY HISTORY:      LABS:                        15.2   11.82 )-----------( 305      ( 01 Oct 2022 04:55 )             47.5     10    136  |  107  |  12  ----------------------------<  90  4.3   |  22  |  0.91    Ca    9.1      01 Oct 2022 04:55  Phos  3.5     10-  Mg     2.2     10-    TPro  8.4<H>  /  Alb  3.7  /  TBili  0.7  /  DBili  x   /  AST  80<H>  /  ALT  63<H>  /  AlkPhos  85  10-      Urinalysis Basic - ( 30 Sep 2022 22:30 )    Color: Yellow / Appearance: Clear / S.025 / pH: x  Gluc: x / Ketone: Large  / Bili: Negative / Urobili: Negative   Blood: x / Protein: 30 mg/dL / Nitrite: Negative   Leuk Esterase: Negative / RBC: 0-2 /HPF / WBC 0-2 /HPF   Sq Epi: x / Non Sq Epi: Few /HPF / Bacteria: Trace /HPF      CAPILLARY BLOOD GLUCOSE            Urinalysis Basic - ( 30 Sep 2022 22:30 )    Color: Yellow / Appearance: Clear / S.025 / pH: x  Gluc: x / Ketone: Large  / Bili: Negative / Urobili: Negative   Blood: x / Protein: 30 mg/dL / Nitrite: Negative   Leuk Esterase: Negative / RBC: 0-2 /HPF / WBC 0-2 /HPF   Sq Epi: x / Non Sq Epi: Few /HPF / Bacteria: Trace /HPF        MEDICATIONS  (STANDING):  enoxaparin Injectable 40 milliGRAM(s) SubCutaneous every 24 hours  haloperidol    Injectable 5 milliGRAM(s) IntraMuscular Once  sodium chloride 0.9%. 1000 milliLiter(s) (100 mL/Hr) IV Continuous <Continuous>    MEDICATIONS  (PRN):      REVIEW OF SYSTEMS:  CONSTITUTIONAL: No fever, weight loss, or fatigue  EYES: No eye pain, visual disturbances, or discharge  ENMT:  No difficulty hearing, tinnitus, vertigo; No sinus or throat pain  NECK: No pain or stiffness  RESPIRATORY: No cough, wheezing, chills or hemoptysis; No shortness of breath  CARDIOVASCULAR: No chest pain, palpitations, dizziness, or leg swelling  GASTROINTESTINAL: No abdominal or epigastric pain. No nausea, vomiting, or hematemesis; No diarrhea or constipation. No melena or hematochezia.  GENITOURINARY: No dysuria, frequency, hematuria, or incontinence  NEUROLOGICAL: No headaches, memory loss, loss of strength, numbness, or tremors  SKIN: No itching, burning, rashes, or lesions   LYMPH NODES: No enlarged glands  ENDOCRINE: No heat or cold intolerance; No hair loss  MUSCULOSKELETAL: No joint pain or swelling; No muscle, back, or extremity pain  PSYCHIATRIC: No depression, anxiety, mood swings, or difficulty sleeping  HEME/LYMPH: No easy bruising, or bleeding gums  ALLERY AND IMMUNOLOGIC: No hives or eczema    PHYSICAL EXAM:  GENERAL: NAD, well-groomed, well-developed  HEAD:  Atraumatic, Normocephalic  EYES: EOMI, PERRLA, conjunctiva and sclera clear  ENMT: No tonsillar erythema, exudates, or enlargement; Moist mucous membranes, Good dentition, No lesions  NECK: Supple, No JVD, Normal thyroid  NERVOUS SYSTEM:  Alert & Oriented X3, Good concentration; Motor Strength 5/5 B/L upper and lower extremities; DTRs 2+ intact and symmetric  CHEST/LUNG: Clear to percussion bilaterally; No rales, rhonchi, wheezing, or rubs  HEART: Regular rate and rhythm; No murmurs, rubs, or gallops  ABDOMEN: Soft, Nontender, Nondistended; Bowel sounds present  EXTREMITIES:  2+ Peripheral Pulses, No clubbing, cyanosis, or edema  LYMPH: No lymphadenopathy noted  SKIN: No rashes or lesions    RADIOLOGY & ADDITIONAL TESTS:  < from: CT Head No Cont (22 @ 20:48) >  IMPRESSION:  Noacute intracranial hemorrhage or mass effect.      < end of copied text >    Imaging Personally Reviewed:  [ x] YES  [ ] NO    Consultant(s) Notes Reviewed:  [x ] YES  [ ] NO        Care Discussed with Consultants/Other Providers [ ] YES  [ ] NO

## 2022-10-01 NOTE — CONSULT NOTE ADULT - ASSESSMENT
A/P: Complex partial seizure vs delirium intoxication  Recommend EEG    If mental status remains normal follow up for  ambulatory EEG and MRI in outpatient       A/P: Complex partial seizure vs delirium intoxication; also currently appears to exhibit symptoms of ADHD.       If mental status remains normal follow up for  ambulatory EEG and Neuropsychology tests in outpatient

## 2022-10-02 DIAGNOSIS — F29 UNSPECIFIED PSYCHOSIS NOT DUE TO A SUBSTANCE OR KNOWN PHYSIOLOGICAL CONDITION: ICD-10-CM

## 2022-10-02 LAB
ALBUMIN SERPL ELPH-MCNC: 3.5 G/DL — SIGNIFICANT CHANGE UP (ref 3.5–5)
ALP SERPL-CCNC: 75 U/L — SIGNIFICANT CHANGE UP (ref 40–120)
ALT FLD-CCNC: 66 U/L DA — HIGH (ref 10–60)
ANION GAP SERPL CALC-SCNC: 9 MMOL/L — SIGNIFICANT CHANGE UP (ref 5–17)
AST SERPL-CCNC: 52 U/L — HIGH (ref 10–40)
BILIRUB SERPL-MCNC: 0.4 MG/DL — SIGNIFICANT CHANGE UP (ref 0.2–1.2)
BUN SERPL-MCNC: 10 MG/DL — SIGNIFICANT CHANGE UP (ref 7–18)
CALCIUM SERPL-MCNC: 9.2 MG/DL — SIGNIFICANT CHANGE UP (ref 8.4–10.5)
CHLORIDE SERPL-SCNC: 110 MMOL/L — HIGH (ref 96–108)
CO2 SERPL-SCNC: 22 MMOL/L — SIGNIFICANT CHANGE UP (ref 22–31)
CREAT SERPL-MCNC: 0.81 MG/DL — SIGNIFICANT CHANGE UP (ref 0.5–1.3)
EGFR: 126 ML/MIN/1.73M2 — SIGNIFICANT CHANGE UP
GLUCOSE SERPL-MCNC: 85 MG/DL — SIGNIFICANT CHANGE UP (ref 70–99)
HCT VFR BLD CALC: 47.2 % — SIGNIFICANT CHANGE UP (ref 39–50)
HGB BLD-MCNC: 15.4 G/DL — SIGNIFICANT CHANGE UP (ref 13–17)
MAGNESIUM SERPL-MCNC: 2.1 MG/DL — SIGNIFICANT CHANGE UP (ref 1.6–2.6)
MCHC RBC-ENTMCNC: 28 PG — SIGNIFICANT CHANGE UP (ref 27–34)
MCHC RBC-ENTMCNC: 32.6 GM/DL — SIGNIFICANT CHANGE UP (ref 32–36)
MCV RBC AUTO: 85.8 FL — SIGNIFICANT CHANGE UP (ref 80–100)
NRBC # BLD: 0 /100 WBCS — SIGNIFICANT CHANGE UP (ref 0–0)
PHOSPHATE SERPL-MCNC: 2.5 MG/DL — SIGNIFICANT CHANGE UP (ref 2.5–4.5)
PLATELET # BLD AUTO: 347 K/UL — SIGNIFICANT CHANGE UP (ref 150–400)
POTASSIUM SERPL-MCNC: 3.7 MMOL/L — SIGNIFICANT CHANGE UP (ref 3.5–5.3)
POTASSIUM SERPL-SCNC: 3.7 MMOL/L — SIGNIFICANT CHANGE UP (ref 3.5–5.3)
PROT SERPL-MCNC: 8.2 G/DL — SIGNIFICANT CHANGE UP (ref 6–8.3)
RBC # BLD: 5.5 M/UL — SIGNIFICANT CHANGE UP (ref 4.2–5.8)
RBC # FLD: 14.2 % — SIGNIFICANT CHANGE UP (ref 10.3–14.5)
SODIUM SERPL-SCNC: 141 MMOL/L — SIGNIFICANT CHANGE UP (ref 135–145)
WBC # BLD: 8.02 K/UL — SIGNIFICANT CHANGE UP (ref 3.8–10.5)
WBC # FLD AUTO: 8.02 K/UL — SIGNIFICANT CHANGE UP (ref 3.8–10.5)

## 2022-10-02 RX ORDER — OLANZAPINE 15 MG/1
5 TABLET, FILM COATED ORAL AT BEDTIME
Refills: 0 | Status: DISCONTINUED | OUTPATIENT
Start: 2022-10-02 | End: 2022-10-03

## 2022-10-02 RX ORDER — RISPERIDONE 4 MG/1
4 TABLET ORAL DAILY
Refills: 0 | Status: DISCONTINUED | OUTPATIENT
Start: 2022-10-02 | End: 2022-10-03

## 2022-10-02 RX ADMIN — OLANZAPINE 5 MILLIGRAM(S): 15 TABLET, FILM COATED ORAL at 21:39

## 2022-10-02 RX ADMIN — Medication 5 MILLIGRAM(S): at 21:39

## 2022-10-02 RX ADMIN — RISPERIDONE 4 MILLIGRAM(S): 4 TABLET ORAL at 11:17

## 2022-10-02 NOTE — CHART NOTE - NSCHARTNOTEFT_GEN_A_CORE
EVENT: Pt is delusional and psychosis    OBJECTIVE:  Vital Signs Last 24 Hrs  T(C): 36.6 (02 Oct 2022 05:00), Max: 36.7 (01 Oct 2022 09:57)  T(F): 97.8 (02 Oct 2022 05:00), Max: 98.1 (01 Oct 2022 09:57)  HR: 82 (02 Oct 2022 05:00) (81 - 93)  BP: 128/78 (02 Oct 2022 05:00) (121/82 - 135/73)  BP(mean): --  RR: 18 (02 Oct 2022 05:00) (17 - 18)  SpO2: 98% (02 Oct 2022 05:00) (96% - 100%)    Parameters below as of 02 Oct 2022 05:00  Patient On (Oxygen Delivery Method): room air    FOCUSED PHYSICAL EXAM: Pt is alert and oriented to self ,place and time. however pt is showing delusional and psychosis behavior. pt states that He will need brain surgery in future, needs frequent redirecting to make conversation. Denies suicidal ideation and attempts. Per mother and sister, pt has been this way for a while. Pt used  to follow therapy in 2014 and was on benperidol per mother. Pt stopped seeing therapy since 2016. Attempted tele psych consult # [phone]. was told to call back in am at 0830. pt started on risperidone 4mg for now. will follow up in am with tele psych. Discussed with Dr Bob who is covering Dr Malik today.    LABS:                        15.4   8.02  )-----------( 347      ( 02 Oct 2022 06:05 )             47.2     10-02    141  |  110<H>  |  10  ----------------------------<  85  3.7   |  22  |  0.81    Ca    9.2      02 Oct 2022 06:05  Phos  2.5     10-02  Mg     2.1     10-02    TPro  8.2  /  Alb  3.5  /  TBili  0.4  /  DBili  x   /  AST  52<H>  /  ALT  66<H>  /  AlkPhos  75  10-02      EKG:   IMGAGING:    ASSESSMENT:  HPI:  Patient is a 22 year old male with hx of bilateral hearing loss at birth, presents with altered mental status. Patient is accompanied by his sister. As per sister, patient arrived home this morning around 6am with rambling speech and confusion. Last seen by family yesterday afternoon. Patient states that he had a similar episode 5-6 years ago after using marijuana.   Patient endorses occasional Marijuana use, and states that he smoked 2 vape cartridges early Friday morning      a/p  # psychosis  start risperidone and Zyprexa  safety precaution  f/u psych consult in at 0830

## 2022-10-02 NOTE — PROGRESS NOTE ADULT - SUBJECTIVE AND OBJECTIVE BOX
pt seen in icu [  ], reg med floor [  x ], bed [x  ], chair at bedside [   ]  Awake, alert, comfortable in bed in NAD  REVIEW OF SYSTEMS:    CONSTITUTIONAL: No weakness, fevers or chills  EYES/ENT: No visual changes;  No vertigo or throat pain   NECK: No pain or stiffness  RESPIRATORY: No cough, wheezing, hemoptysis; No shortness of breath  CARDIOVASCULAR: No chest pain or palpitations  GASTROINTESTINAL: No abdominal or epigastric pain. No nausea, vomiting, or hematemesis; No diarrhea or constipation. No melena or hematochezia.  GENITOURINARY: No dysuria, frequency or hematuria  NEUROLOGICAL: No numbness or weakness  SKIN: No itching, burning, rashes, or lesions   All other review of systems is negative unless indicated above.    Physical Exam    General: WN/WD NAD  Neurology: A&Ox3, nonfocal, PARISI x 4  Respiratory: CTA B/L  CV: RRR, S1S2, no murmurs, rubs or gallops  Abdominal: Soft, NT, ND +BS, Last BM  Extremities: No edema, + peripheral pulses      Allergies  No Known Allergies      Health Issues  Altered mental status    Hearing loss    No pertinent past medical history    No significant past surgical history        Vitals  T(F): 97.8 (10-02-22 @ 05:00), Max: 98.1 (10-01-22 @ 20:34)  HR: 82 (10-02-22 @ 05:00) (81 - 87)  BP: 128/78 (10-02-22 @ 05:00) (121/82 - 135/73)  RR: 18 (10-02-22 @ 05:00) (17 - 18)  SpO2: 98% (10-02-22 @ 05:00) (96% - 100%)  Wt(kg): --  CAPILLARY BLOOD GLUCOSE          Labs                          15.4   8.02  )-----------( 347      ( 02 Oct 2022 06:05 )             47.2       10-02    141  |  110<H>  |  10  ----------------------------<  85  3.7   |  22  |  0.81    Ca    9.2      02 Oct 2022 06:05  Phos  2.5     10-02  Mg     2.1     10-02    TPro  8.2  /  Alb  3.5  /  TBili  0.4  /  DBili  x   /  AST  52<H>  /  ALT  66<H>  /  AlkPhos  75  10-02            Radiology Results      Meds    MEDICATIONS  (STANDING):  enoxaparin Injectable 40 milliGRAM(s) SubCutaneous every 24 hours  haloperidol    Injectable 5 milliGRAM(s) IntraMuscular Once  melatonin 5 milliGRAM(s) Oral at bedtime  OLANZapine 5 milliGRAM(s) Oral at bedtime  risperiDONE   Tablet 4 milliGRAM(s) Oral daily  sodium chloride 0.9%. 1000 milliLiter(s) (100 mL/Hr) IV Continuous <Continuous>      MEDICATIONS  (PRN):

## 2022-10-03 ENCOUNTER — TRANSCRIPTION ENCOUNTER (OUTPATIENT)
Age: 22
End: 2022-10-03

## 2022-10-03 DIAGNOSIS — F43.10 POST-TRAUMATIC STRESS DISORDER, UNSPECIFIED: ICD-10-CM

## 2022-10-03 DIAGNOSIS — Z02.9 ENCOUNTER FOR ADMINISTRATIVE EXAMINATIONS, UNSPECIFIED: ICD-10-CM

## 2022-10-03 LAB
ALBUMIN SERPL ELPH-MCNC: 3.8 G/DL — SIGNIFICANT CHANGE UP (ref 3.5–5)
ALP SERPL-CCNC: 71 U/L — SIGNIFICANT CHANGE UP (ref 40–120)
ALT FLD-CCNC: 71 U/L DA — HIGH (ref 10–60)
ANION GAP SERPL CALC-SCNC: 10 MMOL/L — SIGNIFICANT CHANGE UP (ref 5–17)
AST SERPL-CCNC: 44 U/L — HIGH (ref 10–40)
BILIRUB SERPL-MCNC: 0.4 MG/DL — SIGNIFICANT CHANGE UP (ref 0.2–1.2)
BUN SERPL-MCNC: 12 MG/DL — SIGNIFICANT CHANGE UP (ref 7–18)
CALCIUM SERPL-MCNC: 10.2 MG/DL — SIGNIFICANT CHANGE UP (ref 8.4–10.5)
CHLORIDE SERPL-SCNC: 106 MMOL/L — SIGNIFICANT CHANGE UP (ref 96–108)
CO2 SERPL-SCNC: 24 MMOL/L — SIGNIFICANT CHANGE UP (ref 22–31)
CREAT SERPL-MCNC: 0.86 MG/DL — SIGNIFICANT CHANGE UP (ref 0.5–1.3)
EGFR: 124 ML/MIN/1.73M2 — SIGNIFICANT CHANGE UP
GLUCOSE SERPL-MCNC: 96 MG/DL — SIGNIFICANT CHANGE UP (ref 70–99)
HCT VFR BLD CALC: 49.1 % — SIGNIFICANT CHANGE UP (ref 39–50)
HGB BLD-MCNC: 16.1 G/DL — SIGNIFICANT CHANGE UP (ref 13–17)
MAGNESIUM SERPL-MCNC: 2.5 MG/DL — SIGNIFICANT CHANGE UP (ref 1.6–2.6)
MCHC RBC-ENTMCNC: 28 PG — SIGNIFICANT CHANGE UP (ref 27–34)
MCHC RBC-ENTMCNC: 32.8 GM/DL — SIGNIFICANT CHANGE UP (ref 32–36)
MCV RBC AUTO: 85.4 FL — SIGNIFICANT CHANGE UP (ref 80–100)
NRBC # BLD: 0 /100 WBCS — SIGNIFICANT CHANGE UP (ref 0–0)
PHOSPHATE SERPL-MCNC: 3.5 MG/DL — SIGNIFICANT CHANGE UP (ref 2.5–4.5)
PLATELET # BLD AUTO: 362 K/UL — SIGNIFICANT CHANGE UP (ref 150–400)
POTASSIUM SERPL-MCNC: 4 MMOL/L — SIGNIFICANT CHANGE UP (ref 3.5–5.3)
POTASSIUM SERPL-SCNC: 4 MMOL/L — SIGNIFICANT CHANGE UP (ref 3.5–5.3)
PROT SERPL-MCNC: 8.4 G/DL — HIGH (ref 6–8.3)
RBC # BLD: 5.75 M/UL — SIGNIFICANT CHANGE UP (ref 4.2–5.8)
RBC # FLD: 14.4 % — SIGNIFICANT CHANGE UP (ref 10.3–14.5)
SODIUM SERPL-SCNC: 140 MMOL/L — SIGNIFICANT CHANGE UP (ref 135–145)
WBC # BLD: 6.39 K/UL — SIGNIFICANT CHANGE UP (ref 3.8–10.5)
WBC # FLD AUTO: 6.39 K/UL — SIGNIFICANT CHANGE UP (ref 3.8–10.5)

## 2022-10-03 PROCEDURE — 90792 PSYCH DIAG EVAL W/MED SRVCS: CPT | Mod: 95

## 2022-10-03 RX ORDER — ESCITALOPRAM OXALATE 10 MG/1
10 TABLET, FILM COATED ORAL DAILY
Refills: 0 | Status: DISCONTINUED | OUTPATIENT
Start: 2022-10-03 | End: 2022-10-05

## 2022-10-03 RX ORDER — ARIPIPRAZOLE 15 MG/1
5 TABLET ORAL DAILY
Refills: 0 | Status: DISCONTINUED | OUTPATIENT
Start: 2022-10-03 | End: 2022-10-05

## 2022-10-03 RX ADMIN — ESCITALOPRAM OXALATE 10 MILLIGRAM(S): 10 TABLET, FILM COATED ORAL at 14:41

## 2022-10-03 RX ADMIN — Medication 5 MILLIGRAM(S): at 21:04

## 2022-10-03 RX ADMIN — RISPERIDONE 4 MILLIGRAM(S): 4 TABLET ORAL at 11:26

## 2022-10-03 RX ADMIN — ARIPIPRAZOLE 5 MILLIGRAM(S): 15 TABLET ORAL at 14:42

## 2022-10-03 NOTE — DISCHARGE NOTE PROVIDER - CARE PROVIDER_API CALL
Kimani Majano)  [address] [place] [address]  Phone: [phone]  Fax: [phone]  Follow Up Time: 1 week   Kimani Majano)  [address] [place] [address]  Phone: [phone]  Fax: [phone]  Follow Up Time: 1 week    [facility]  Monday 10/10/2022 at 115pm  Phone: (   )    -  Fax: (   )    -  Follow Up Time:

## 2022-10-03 NOTE — DISCHARGE NOTE PROVIDER - NSDCMRMEDTOKEN_GEN_ALL_CORE_FT
ARIPiprazole 5 mg oral tablet: 1 tab(s) orally once a day  escitalopram 10 mg oral tablet: 1 tab(s) orally once a day  melatonin 5 mg oral tablet: 1 tab(s) orally once a day (at bedtime)

## 2022-10-03 NOTE — DISCHARGE NOTE PROVIDER - NSDCCPCAREPLAN_GEN_ALL_CORE_FT
PRINCIPAL DISCHARGE DIAGNOSIS  Diagnosis: Acute psychosis  Assessment and Plan of Treatment: You presented to the hospital in acute psychosis, this is a short-term mental illness where you have psychotic symptoms, such as hallucinations and delusions. This was likely caused by your Cannabis (marijuana) use. You were followed by a neurologist and psychiatrist you were started on abilify 5mg, and escitalopram 10mg. It is important to follow up with your psychiatrist for continued treatment and management  Avoid alcohol: Alcohol can cause sleep problems, make you feel depressed, and increase stress.  Get plenty of exercise: Ask about the best exercise plan for you. Exercise can decrease stress, lower your blood pressure, and improve your health.  Know the warning signs and get help as soon as possible:  You have changes in how you think, feel, and see things.  You become more nervous and upset, but do not know why.  You are not doing well at work or school.  You have little or no interest in your friends.  For support and more information:  American [employer] Association  [address], [address]  [place] [address]  Phone: [phone]  Phone: 1-567.591.9027  Web Address: http://www.psych.org  [employer] [facility] ([facility]), Office of Science Policy, Planning, and Communications  [address] [place] [address]  [place], [place]  Phone: [phone]  Phone: [phone]  Web Address: http://www.[facility].gov/      SECONDARY DISCHARGE DIAGNOSES  Diagnosis: PTSD (post-traumatic stress disorder)  Assessment and Plan of Treatment: Post traumatic stress disorder (PTSD) is a condition that occurs after a traumatic situation or event. This event may have caused you to feel intense fear, pain, or sorrow. You may think you are going to get hurt or die. You may also continue to feel helpless after the event. These feelings affect your daily activities and relationships. Continue taking your medications as prescribed. Therapy may be done in a group or one on one with a therapist. Family and friends are also an important part of recovery.  Cognitive behavior therapy helps you learn to face the feared object or situation slowly and carefully. You will also learn to control your mental and physical reactions of fear.  During cognitive processing therapy, a therapist helps you identify which thoughts about the trauma cause anxiety. He or she will help you see the event differently. This may help you learn to change your thoughts and decrease your anxiety.  During prolonged exposure, a therapist helps you work through thoughts, feelings, and memories about the trauma. A therapist helps you learn how to handle your thoughts and feelings. This can decrease your fear or anxiety.  Talk therapy may be one or more meetings with a therapist to have crisis counseling. You may have this right after a traumatic event to prevent or decrease further emotional problems.  Relaxation therapy teaches you how to feel less physical and emotional stress. Stress may cause pain, lead to illness, and slow healing. Deep breathing, muscle relaxation, and music are some forms of relaxation therapy.  Eye movement desensitization and reprocessing (EMDR) is a type of exposure therapy. Healthcare providers help you make your eyes move back and forth while you imagine the trauma.  Transcranial magnetic stimulation (TMS) is a procedure used to stimulate a specific part of the brain with pulses from a magnet.   For support and more information:  [facility]  Phone: [phone]  Web Address: [email or web address]

## 2022-10-03 NOTE — PROGRESS NOTE ADULT - SUBJECTIVE AND OBJECTIVE BOX
Patient is a 22y old  Male who presents with a chief complaint of Altered Mental Status 2/2 Cannibis use (03 Oct 2022 10:20)      INTERVAL HPI/OVERNIGHT EVENTS: no acute envents onvernight    REVIEW OF SYSTEMS:  CONSTITUTIONAL: No fever, chills  ENMT:  No difficulty hearing, no change in vision  NECK: No pain or stiffness  RESPIRATORY: No cough, SOB  CARDIOVASCULAR: No chest pain, palpitations  GASTROINTESTINAL: No abdominal pain. No nausea, vomiting, or diarrhea  GENITOURINARY: No dysuria  NEUROLOGICAL: No HA  SKIN: No itching, burning, rashes, or lesions   LYMPH NODES: No enlarged glands  ENDOCRINE: No heat or cold intolerance; No hair loss  MUSCULOSKELETAL: No joint pain or swelling; No muscle, back, or extremity pain  PSYCHIATRIC: No depression, anxiety  HEME/LYMPH: No easy bruising, or bleeding gums    T(C): 36.5 (10-03-22 @ 05:00), Max: 36.7 (10-02-22 @ 20:23)  HR: 60 (10-03-22 @ 05:00) (60 - 96)  BP: 113/79 (10-03-22 @ 05:00) (113/79 - 132/79)  RR: 18 (10-03-22 @ 05:00) (18 - 18)  SpO2: 99% (10-03-22 @ 05:00) (96% - 99%)  Wt(kg): --Vital Signs Last 24 Hrs  T(C): 36.5 (03 Oct 2022 05:00), Max: 36.7 (02 Oct 2022 20:23)  T(F): 97.7 (03 Oct 2022 05:00), Max: 98 (02 Oct 2022 20:23)  HR: 60 (03 Oct 2022 05:00) (60 - 96)  BP: 113/79 (03 Oct 2022 05:00) (113/79 - 132/79)  BP(mean): --  RR: 18 (03 Oct 2022 05:00) (18 - 18)  SpO2: 99% (03 Oct 2022 05:00) (96% - 99%)    Parameters below as of 03 Oct 2022 05:00  Patient On (Oxygen Delivery Method): room air    MEDICATIONS  (STANDING):  ARIPiprazole 5 milliGRAM(s) Oral daily  enoxaparin Injectable 40 milliGRAM(s) SubCutaneous every 24 hours  escitalopram 10 milliGRAM(s) Oral daily  melatonin 5 milliGRAM(s) Oral at bedtime  sodium chloride 0.9%. 1000 milliLiter(s) (100 mL/Hr) IV Continuous <Continuous>    MEDICATIONS  (PRN):        PHYSICAL EXAM:  GENERAL: NAD  EYES: clear conjunctiva; EOMI  ENMT: Moist mucous membranes  NECK: Supple, No JVD, Normal thyroid  CHEST/LUNG: Clear to auscultation bilaterally; No rales, rhonchi, wheezing, or rubs  HEART: S1, S2, Regular rate and rhythm  ABDOMEN: Soft, Nontender, Nondistended; Bowel sounds present  NEURO: Alert & Oriented X3  EXTREMITIES: No LE edema, no calf tenderness  LYMPH: No lymphadenopathy noted  SKIN: No rashes or lesions    Consultant(s) Notes Reviewed:  [x ] YES  [ ] NO  Care Discussed with Consultants/Other Providers [ x] YES  [ ] NO    LABS:                        16.1   6.39  )-----------( 362      ( 03 Oct 2022 06:50 )             49.1     10-03    140  |  106  |  12  ----------------------------<  96  4.0   |  24  |  0.86    Ca    10.2      03 Oct 2022 06:50  Phos  3.5     10-03  Mg     2.5     10-03    TPro  8.4<H>  /  Alb  3.8  /  TBili  0.4  /  DBili  x   /  AST  44<H>  /  ALT  71<H>  /  AlkPhos  71  10-03      CAPILLARY BLOOD GLUCOSE    RADIOLOGY & ADDITIONAL TESTS:    Imaging Personally Reviewed:  [ x] YES  [ ] NO  < from: CT Head No Cont (09.30.22 @ 20:48) >  ACC: [id number] EXAM:  CT BRAIN                          PROCEDURE DATE:  09/30/2022          INTERPRETATION:  CLINICAL INFORMATION:  AMS    TECHNIQUE:  Axial CT images were acquired through the head.  Intravenous contrast: None  Two-dimensional reformats were generated.    COMPARISON STUDY: MRI brain 7/14/2014.    FINDINGS:    There is no CT evidence of acute intracranial hemorrhage, mass effect,   midline shift, or acute, large territorial infarct.  The ventricles and   sulci are normal in sizeand configuration. The basal cisterns are patent.    The mastoid air cells and middle ear cavities are grossly clear. There is   no significant paranasal sinus mucosal thickening.    The calvarium and skull base are grossly intact.    IMPRESSION:  Noacute intracranial hemorrhage or mass effect.    --- End of Report ---            AVILA SHAIKH MD; Attending Radiologist  This document has been electronically signed. Sep 30 2022  9:14PM    < end of copied text >  < from: Xray Chest 1 View- PORTABLE-Urgent (09.30.22 @ 18:02) >    ACC: [id number] EXAM:  XR CHEST PORTABLE URGENT 1V                          PROCEDURE DATE:  09/30/2022          INTERPRETATION:  Portable chest radiograph    CLINICAL INFORMATION: Altered mental status    TECHNIQUE:  Portable  AP chest radiograph.    COMPARISON: None. .    FINDINGS:  CATHETERS AND TUBES: None    PULMONARY: The visualized lungs are clear of airspace consolidations or   effusions.   No pneumothorax.    HEART/VASCULAR: The heart and mediastinum size and configuration are   within normal limits.    BONES: Visualized osseous structures are intact.    IMPRESSION:   No radiographic evidence of active chest disease.    --- End of Report ---            ESMER GRESHAM MD; Attending Radiologist  This document has been electronically signed. Sep 30 2022  7:03PM    < end of copied text >

## 2022-10-03 NOTE — BH CONSULTATION LIAISON ASSESSMENT NOTE - NSBHCONSULTRECOMMENDOTHER_PSY_A_CORE FT
-d/c olanzapine  -d/c risperidone  - d/c haldol  - start abilify 5mg PO Qam  - start escitalopram 10mg PO Qam  - no indication for involuntary psychiatric hospitalization  - please assure psychiatric follow-up on discharge

## 2022-10-03 NOTE — BH CONSULTATION LIAISON ASSESSMENT NOTE - HPI (INCLUDE ILLNESS QUALITY, SEVERITY, DURATION, TIMING, CONTEXT, MODIFYING FACTORS, ASSOCIATED SIGNS AND SYMPTOMS)
21 y/o male with no significant medical or psychiatric history presented to the ED with altered mental status for one day observed by family in the context of recent marjiuana use and admitted to medical floor for work-up and observation. Psychiatry consulted as patient presented with possible psychotic symptoms.    On evaluation, patient calm, alert and oriented, easily engaged. His speech is spontaneous, and with increased rate of speech (although not pressured, and as per family usual state), is mostly linear and reliable. He is able to confirm that he smoked marijuana and felt unwell, prompting hospitalization. He reports feeling back to normal and requests help with increasing his coping skills to help manage his depression and what he calls PTSD after losing two family members, cousin, and god father, whom he was close to in since 2020.    Patient reports using marijuana to cope at times when he becomes overwhelmed with his feelings, and usually it doesn't make him sick, but drinking in combination led to this hospitalization. He reports he rarely drinks, and will continue to try and substitute better coping skills instead of drugs in the future.    Patient also states that he would like to get back to therapy, as he was discharged when he turned 16 and was unable to secure an adult therapist and eventually forgot about it.    Patient does not appear to have any significant evidence of a thought disorder at this time, although was observed to make illogical statement on admission. Most probably due to acute cannabis intoxication and susceptibility to psychosis.    Patient reports he believes he has PTSD from the passing of his family. He describes being in the ICU with his godfather when he passed, and the events partaking his passing. He recalls having to be forced to leave due to protocol, and believing it was his fault. He has constants reminders of his godfather, and ruminates over his passing. He denies having nightmares or trouble sleeping. He identifies guilt, and grief as primary feelings. He remains future-oriented and is hopeful he will be able to move on.    Patient denies any previous hospitalizations. No history of psychiatric diagnoses as per chart review and Psyckes.    Patient denies any SI/HI/AVH. Denies any history of suicide attempts. Is Mormonism and identifies this as protective factor    Denies any aggression and violence towards self or others.    Affective regulation adequate.    Patient reports efficacy from risperidone, but has not taken it in some time, amenable to changes and requests medication to help with thoughts and mood.

## 2022-10-03 NOTE — BH CONSULTATION LIAISON ASSESSMENT NOTE - VIOLENCE PROTECTIVE FACTORS:
Residential stability/Relationship stability/Affective Stability/Insight into violence risk and need for management/treatment/Good treatment response/compliance

## 2022-10-03 NOTE — DISCHARGE NOTE PROVIDER - PROVIDER TOKENS
PROVIDER:[TOKEN:[[id number]],FOLLOWUP:[1 week]] PROVIDER:[TOKEN:[[id number]],FOLLOWUP:[1 week]],FREE:[LAST:[[facility]],FIRST:[[facility]],PHONE:[(   )    -],FAX:[(   )    -],ADDRESS:[Monday 10/10/2022 at 115pm]]

## 2022-10-03 NOTE — BH CONSULTATION LIAISON ASSESSMENT NOTE - CURRENT MEDICATION
MEDICATIONS  (STANDING):  enoxaparin Injectable 40 milliGRAM(s) SubCutaneous every 24 hours  haloperidol    Injectable 5 milliGRAM(s) IntraMuscular Once  melatonin 5 milliGRAM(s) Oral at bedtime  OLANZapine 5 milliGRAM(s) Oral at bedtime  risperiDONE   Tablet 4 milliGRAM(s) Oral daily  sodium chloride 0.9%. 1000 milliLiter(s) (100 mL/Hr) IV Continuous <Continuous>    MEDICATIONS  (PRN):

## 2022-10-03 NOTE — PROGRESS NOTE ADULT - PROBLEM SELECTOR PLAN 3
- Pt is cleared by psych, will OP follow up  - will f/u with SW - Pt is cleared by psych, will need OP follow up  - will f/u with SW

## 2022-10-03 NOTE — BH CONSULTATION LIAISON ASSESSMENT NOTE - NSSUICPROTFACT_PSY_ALL_CORE
Responsibility to children, family, or others/Identifies reasons for living/Supportive social network of family or friends/Fear of death or the actual act of killing self/Cultural, spiritual and/or moral attitudes against suicide/Anglican beliefs

## 2022-10-03 NOTE — BH CONSULTATION LIAISON ASSESSMENT NOTE - NSICDXPASTMEDICALHX_GEN_ALL_CORE_FT
PAST MEDICAL HISTORY:  Cannabis use with other cannabis-induced disorder     Hearing loss Right-sided hearing loss at the age of 4. Prescribed a hearing aid.    PTSD (post-traumatic stress disorder)

## 2022-10-03 NOTE — BH CONSULTATION LIAISON ASSESSMENT NOTE - NSBHCHARTREVIEWVS_PSY_A_CORE FT
Vital Signs Last 24 Hrs  T(C): 36.5 (03 Oct 2022 05:00), Max: 36.7 (02 Oct 2022 20:23)  T(F): 97.7 (03 Oct 2022 05:00), Max: 98 (02 Oct 2022 20:23)  HR: 60 (03 Oct 2022 05:00) (60 - 96)  BP: 113/79 (03 Oct 2022 05:00) (113/79 - 132/79)  BP(mean): --  RR: 18 (03 Oct 2022 05:00) (18 - 18)  SpO2: 99% (03 Oct 2022 05:00) (96% - 99%)    Parameters below as of 03 Oct 2022 05:00  Patient On (Oxygen Delivery Method): room air

## 2022-10-03 NOTE — PROGRESS NOTE ADULT - SUBJECTIVE AND OBJECTIVE BOX
Patient is a 22y old  Male who presents with a chief complaint of Altered Mental Status 2/2 Cannibis use (02 Oct 2022 10:22)    pt seen in icu [  ], reg med floor [   ], bed [  ], chair at bedside [   ], a+o x3 [  ], lethargic [  ],  nad [  ]    allen [  ], ngt [  ], peg [  ], et tube [  ], cent line [  ], picc line [  ]        Allergies    No Known Allergies        Vitals    T(F): 97.7 (10-03-22 @ 05:00), Max: 98 (10-02-22 @ 20:23)  HR: 60 (10-03-22 @ 05:00) (60 - 96)  BP: 113/79 (10-03-22 @ 05:00) (113/79 - 132/79)  RR: 18 (10-03-22 @ 05:00) (18 - 18)  SpO2: 99% (10-03-22 @ 05:00) (96% - 99%)  Wt(kg): --  CAPILLARY BLOOD GLUCOSE          Labs                          16.1   6.39  )-----------( 362      ( 03 Oct 2022 06:50 )             49.1       10-03    140  |  106  |  12  ----------------------------<  96  4.0   |  24  |  0.86    Ca    10.2      03 Oct 2022 06:50  Phos  3.5     10-03  Mg     2.5     10-03    TPro  8.4<H>  /  Alb  3.8  /  TBili  0.4  /  DBili  x   /  AST  44<H>  /  ALT  71<H>  /  AlkPhos  71  10-03                Radiology Results      Meds    MEDICATIONS  (STANDING):  enoxaparin Injectable 40 milliGRAM(s) SubCutaneous every 24 hours  haloperidol    Injectable 5 milliGRAM(s) IntraMuscular Once  melatonin 5 milliGRAM(s) Oral at bedtime  OLANZapine 5 milliGRAM(s) Oral at bedtime  risperiDONE   Tablet 4 milliGRAM(s) Oral daily  sodium chloride 0.9%. 1000 milliLiter(s) (100 mL/Hr) IV Continuous <Continuous>      MEDICATIONS  (PRN):      Physical Exam    Neuro :  no focal deficits  Respiratory: CTA B/L  CV: RRR, S1S2, no murmurs,   Abdominal: Soft, NT, ND +BS,  Extremities: No edema, + peripheral pulses    ASSESSMENT    Altered mental status    Hearing loss    No pertinent past medical history    No significant past surgical history        PLAN     Patient is a 22y old  Male who presents with a chief complaint of Altered Mental Status 2/2 Cannibis use (02 Oct 2022 10:22)    pt seen in icu [  ], reg med floor [ x  ], bed [x  ], chair at bedside [   ], a+o x3 [x  ], lethargic [  ],  nad [ x ]      Allergies    No Known Allergies        Vitals    T(F): 97.7 (10-03-22 @ 05:00), Max: 98 (10-02-22 @ 20:23)  HR: 60 (10-03-22 @ 05:00) (60 - 96)  BP: 113/79 (10-03-22 @ 05:00) (113/79 - 132/79)  RR: 18 (10-03-22 @ 05:00) (18 - 18)  SpO2: 99% (10-03-22 @ 05:00) (96% - 99%)  Wt(kg): --  CAPILLARY BLOOD GLUCOSE          Labs                          16.1   6.39  )-----------( 362      ( 03 Oct 2022 06:50 )             49.1       10-03    140  |  106  |  12  ----------------------------<  96  4.0   |  24  |  0.86    Ca    10.2      03 Oct 2022 06:50  Phos  3.5     10-03  Mg     2.5     10-03    TPro  8.4<H>  /  Alb  3.8  /  TBili  0.4  /  DBili  x   /  AST  44<H>  /  ALT  71<H>  /  AlkPhos  71  10-03                Radiology Results      Meds    MEDICATIONS  (STANDING):  enoxaparin Injectable 40 milliGRAM(s) SubCutaneous every 24 hours  haloperidol    Injectable 5 milliGRAM(s) IntraMuscular Once  melatonin 5 milliGRAM(s) Oral at bedtime  OLANZapine 5 milliGRAM(s) Oral at bedtime  risperiDONE   Tablet 4 milliGRAM(s) Oral daily  sodium chloride 0.9%. 1000 milliLiter(s) (100 mL/Hr) IV Continuous <Continuous>      MEDICATIONS  (PRN):      Physical Exam    Neuro :  no focal deficits  Respiratory: CTA B/L  CV: RRR, S1S2, no murmurs,   Abdominal: Soft, NT, ND +BS,  Extremities: No edema, + peripheral pulses    ASSESSMENT    Altered mental status 2nd to Toxic metabolic encephalopathy. Cannabis intoxication   F12.929  Psychotic disorder   F29  h/o PTSD    PLAN    neuro follow up   If mental status remains normal follow up for  ambulatory EEG and Neuropsychology tests in outpatient  psych cons   d/c risperidone  d/c olanzapine  d/c haldol  start abilify 5mg PO Qam  start escitalopram 10mg PO Qam  no indication for involuntary psychiatric hospitalization  please assure psychiatric follow-up on discharge   evaluation for referral to drug detox program  cont current meds

## 2022-10-03 NOTE — DISCHARGE NOTE PROVIDER - NSFOLLOWUPCLINICS_GEN_ALL_ED_FT
[facility]  Behavioral Health  [address] [place] [address]  Phone: [phone]  Fax:   Follow Up Time: 1 week

## 2022-10-03 NOTE — BH CONSULTATION LIAISON ASSESSMENT NOTE - RISK ASSESSMENT
acute rf - not in outpatient treatment, active substance use,    protective rf - Taoist, fearful of death, future-oriented, requesting treatment, help-seeking, insightful, no history of self-harm or suicide attempts, denies SI currently, supportive family, residential security.

## 2022-10-03 NOTE — BH CONSULTATION LIAISON ASSESSMENT NOTE - SUMMARY
This is a 22 year old male with no significant medical or psychiatric history who is in his 3rd day of admission for altered mental status in the context of cannabis intoxication. Patient's subjective experience is identified as having PTSD from his godfathers passing in which he was present in the ICU for during the event, along with depressive symptomology including guilt and complicated grief. It is possible patient may have predisposition to psychosis given his odd and illogical statements and behavior during the short admission, although today and on day of discharge his thought process appears to have returned to present as linear and reliable. Patient has significant protective factors which mitigate his risk for self-harm and aggression towards others to a low indication, and is himself requesting outpatient treatment with therapy and medication management.

## 2022-10-03 NOTE — DISCHARGE NOTE PROVIDER - HOSPITAL COURSE
Patient is a 22 year old male with hx of bilateral hearing loss at birth, presents with altered mental status. CT head noted to be negative, Utox positive cannabis. Patient was admitted to medicine for acute toxic encephalopathy 2/2 Cannabis use, however, upon speaking with sister and mother, pt was seeing psychiatrist when he was adolescent was on risperidone for psychotic disorder. Pt  stopped following up when he turned adult. Per mother pt has been off medication since 2016. PSYCH followed was placed  on Abilify  and Lexapro per recommendation from Dr Chamorro and cleared for discharge with outpatient follow up  Given clinical course decision made to discharge   This is only a brief summary of patient's hospital stay, for full course please see EMR       -*-*-*-* INCOMPLETE Patient is a 22 year old male with hx of bilateral hearing loss at birth, presents with altered mental status. CT head noted to be negative, Utox positive cannabis. Patient was admitted to medicine for acute toxic encephalopathy 2/2 Cannabis use, however, upon speaking with sister and mother, pt was seeing psychiatrist when he was adolescent was on risperidone for psychotic disorder. Pt  stopped following up when he turned adult. Per mother pt has been off medication since 2016. PSYCH followed was placed  on Abilify  and Lexapro per recommendation from Dr Chamorro and cleared for discharge with outpatient follow up.  followed to arrange OP follow up with phycologist.    Please note that this a brief summary of hospital course please refer to daily progress notes and consult notes for full course and events    Patient is a 22 year old male with hx of bilateral hearing loss at birth, presents with altered mental status. CT head noted to be negative, Utox positive cannabis. Patient was admitted to medicine for acute toxic encephalopathy 2/2 Cannabis use, however, upon speaking with sister and mother, pt was seeing psychiatrist when he was adolescent was on risperidone for psychotic disorder. Pt  stopped following up when he turned adult. Per mother pt has been off medication since 2016. PSYCH followed was placed  on Abilify  and Lexapro per recommendation from Dr Chamorro and cleared for discharge with outpatient follow up.  followed to arrange OP follow up with phycologist.    Please note that this a brief summary of hospital course please refer to daily progress notes and consult notes for full course and events     spoke with sister Emily at bed side and discussed regarding pt discharge planning and follow up appointment . all questions were answered.   Patient is a 22 year old male with hx of bilateral hearing loss at birth, presents with altered mental status. CT head noted to be negative, Utox positive cannabis. Patient was admitted to medicine for acute toxic encephalopathy 2/2 Cannabis use, however, upon speaking with sister and mother, pt was seeing psychiatrist when he was adolescent was on risperidone for psychotic disorder. Pt  stopped following up when he turned adult. Per mother pt has been off medication since 2016. PSYCH followed was placed  on Abilify  and Lexapro per recommendation from Dr Chamorro and cleared for discharge with outpatient follow up.  followed to arrange OP follow up with phycologist.    Please note that this a brief summary of hospital course please refer to daily progress notes and consult notes for full course and events     spoke with sister mEily/ and mother at bed side and discussed regarding pt discharge planning and follow up appointment . all questions were answered.  # [id number] Shameka

## 2022-10-04 LAB — SARS-COV-2 RNA SPEC QL NAA+PROBE: SIGNIFICANT CHANGE UP

## 2022-10-04 RX ORDER — ARIPIPRAZOLE 15 MG/1
1 TABLET ORAL
Qty: 30 | Refills: 0
Start: 2022-10-04 | End: 2022-11-02

## 2022-10-04 RX ORDER — ESCITALOPRAM OXALATE 10 MG/1
1 TABLET, FILM COATED ORAL
Qty: 30 | Refills: 0
Start: 2022-10-04 | End: 2022-11-02

## 2022-10-04 RX ORDER — LANOLIN ALCOHOL/MO/W.PET/CERES
1 CREAM (GRAM) TOPICAL
Qty: 30 | Refills: 0
Start: 2022-10-04 | End: 2022-11-02

## 2022-10-04 RX ORDER — LANOLIN ALCOHOL/MO/W.PET/CERES
3 CREAM (GRAM) TOPICAL ONCE
Refills: 0 | Status: COMPLETED | OUTPATIENT
Start: 2022-10-04 | End: 2022-10-04

## 2022-10-04 RX ADMIN — Medication 5 MILLIGRAM(S): at 22:05

## 2022-10-04 RX ADMIN — ARIPIPRAZOLE 5 MILLIGRAM(S): 15 TABLET ORAL at 14:21

## 2022-10-04 RX ADMIN — ENOXAPARIN SODIUM 40 MILLIGRAM(S): 100 INJECTION SUBCUTANEOUS at 14:21

## 2022-10-04 RX ADMIN — ESCITALOPRAM OXALATE 10 MILLIGRAM(S): 10 TABLET, FILM COATED ORAL at 14:21

## 2022-10-04 RX ADMIN — Medication 3 MILLIGRAM(S): at 01:15

## 2022-10-04 NOTE — PROGRESS NOTE ADULT - PROBLEM SELECTOR PLAN 3
- Pt is cleared by psych, will need OP follow up  - Pending OP f/u appointment      - spoke with sister Emily and updated pt regarding pt condition and plan pf care and new medications recommendation from psych. also informed that it is very important that pt keep up with follow - up visit.   all questions were answered.

## 2022-10-04 NOTE — PROGRESS NOTE ADULT - ASSESSMENT
Patient is a 22 year old male with hx of bilateral hearing loss at birth, presents with altered mental status. CT head noted to be negative, Utox positive cannabis. Patient was admitted to medicine for acute toxic encephalopathy 2/2 Cannabis use, however, upon speaking with sister and mother, pt was seeing psychiatrist when he was adolescent was on risperidone for psychotic disorder. Pt  stopped following up when he turned adult. Per mother pt has been off medication since 2016. Pschy consulted. was placed  on Abilify  and Lexapro per recommendation from Dr Chamorro. Sw consult for f/u appointment . cleared by psych.
Patient is a 22 year old male with hx of bilateral hearing loss at birth, presents with altered mental status. CT head noted to be negative, Utox positive cannabis. Patient was admitted to medicine for acute toxic encephalopathy 2/2 Cannabis use, however, upon speaking with sister and mother, pt was seeing psychiatrist when he was adolescent was on risperidone for psychotic disorder. Pt  stopped following up when he turned adult. Per mother pt has been off medication since 2016. Pschy consulted. was placed  on Abilify  and Lexapro per recommendation from Dr Chamorro. Sw consult for f/u appointment . cleared by psych.

## 2022-10-04 NOTE — PROGRESS NOTE ADULT - PROBLEM SELECTOR PLAN 1
Supportive care  neuro follow up   evaluation for referral to drug detox program  correct electrolytes abnormalities
acute psychosis in the setting of PTSD- per sister and mother, pt is at his baseline  - Ct head negative   - denies SI and SA  - followed by Neurologist  - Tele psych consulted  - Start on Abilify and Lexapro  - cont melatonin  - Psych Dr Chamorro
acute psychosis in the setting of PTSD- per sister and mother, pt is at his baseline  - Ct head negative   - denies SI and SA  - followed by Neurologist  - Tele psych consulted- cleared by psych with OP f/u   - Start on Abilify and Lexapro  - cont melatonin  - Psych Dr Chamorro

## 2022-10-04 NOTE — PROGRESS NOTE ADULT - SUBJECTIVE AND OBJECTIVE BOX
Patient is a 22y old  Male who presents with a chief complaint of Altered Mental Status 2/2 Cannibis use (04 Oct 2022 06:51)    INTERVAL HPI/OVERNIGHT EVENTS: no acute events overnight    REVIEW OF SYSTEMS:  CONSTITUTIONAL: No fever, chills  ENMT:  No difficulty hearing, no change in vision  NECK: No pain or stiffness  RESPIRATORY: No cough, SOB  CARDIOVASCULAR: No chest pain, palpitations  GASTROINTESTINAL: No abdominal pain. No nausea, vomiting, or diarrhea  GENITOURINARY: No dysuria  NEUROLOGICAL: No HA  SKIN: No itching, burning, rashes, or lesions   LYMPH NODES: No enlarged glands  ENDOCRINE: No heat or cold intolerance; No hair loss  MUSCULOSKELETAL: No joint pain or swelling; No muscle, back, or extremity pain  PSYCHIATRIC: No depression, anxiety  HEME/LYMPH: No easy bruising, or bleeding gums    T(C): 36.6 (10-04-22 @ 13:20), Max: 36.6 (10-04-22 @ 13:20)  HR: 102 (10-04-22 @ 13:20) (85 - 107)  BP: 120/75 (10-04-22 @ 13:20) (120/75 - 148/99)  RR: 16 (10-04-22 @ 13:20) (16 - 18)  SpO2: 98% (10-04-22 @ 13:20) (98% - 98%)  Wt(kg): --Vital Signs Last 24 Hrs  T(C): 36.6 (04 Oct 2022 13:20), Max: 36.6 (04 Oct 2022 13:20)  T(F): 97.9 (04 Oct 2022 13:20), Max: 97.9 (04 Oct 2022 13:20)  HR: 102 (04 Oct 2022 13:20) (85 - 107)  BP: 120/75 (04 Oct 2022 13:20) (120/75 - 148/99)  BP(mean): 86 (04 Oct 2022 13:20) (80 - 86)  RR: 16 (04 Oct 2022 13:20) (16 - 18)  SpO2: 98% (04 Oct 2022 13:20) (98% - 98%)    Parameters below as of 04 Oct 2022 13:20  Patient On (Oxygen Delivery Method): room air    MEDICATIONS  (STANDING):  ARIPiprazole 5 milliGRAM(s) Oral daily  enoxaparin Injectable 40 milliGRAM(s) SubCutaneous every 24 hours  escitalopram 10 milliGRAM(s) Oral daily  melatonin 5 milliGRAM(s) Oral at bedtime  sodium chloride 0.9%. 1000 milliLiter(s) (100 mL/Hr) IV Continuous <Continuous>    MEDICATIONS  (PRN):    PHYSICAL EXAM:  GENERAL: NAD  EYES: clear conjunctiva; EOMI  ENMT: Moist mucous membranes  NECK: Supple, No JVD, Normal thyroid  CHEST/LUNG: Clear to auscultation bilaterally; No rales, rhonchi, wheezing, or rubs  HEART: S1, S2, Regular rate and rhythm  ABDOMEN: Soft, Nontender, Nondistended; Bowel sounds present  NEURO: Alert & awake  EXTREMITIES: No LE edema, no calf tenderness  LYMPH: No lymphadenopathy noted  SKIN: No rashes or lesions    Consultant(s) Notes Reviewed:  [x ] YES  [ ] NO  Care Discussed with Consultants/Other Providers [ x] YES  [ ] NO    LABS:                        16.1   6.39  )-----------( 362      ( 03 Oct 2022 06:50 )             49.1     10-03    140  |  106  |  12  ----------------------------<  96  4.0   |  24  |  0.86    Ca    10.2      03 Oct 2022 06:50  Phos  3.5     10-03  Mg     2.5     10-03    TPro  8.4<H>  /  Alb  3.8  /  TBili  0.4  /  DBili  x   /  AST  44<H>  /  ALT  71<H>  /  AlkPhos  71  10-03      CAPILLARY BLOOD GLUCOSE    RADIOLOGY & ADDITIONAL TESTS:    Imaging Personally Reviewed:  [x ] YES  [ ] NO  MEDICATIONS  (STANDING):  ARIPiprazole 5 milliGRAM(s) Oral daily  enoxaparin Injectable 40 milliGRAM(s) SubCutaneous every 24 hours  escitalopram 10 milliGRAM(s) Oral daily  melatonin 5 milliGRAM(s) Oral at bedtime  sodium chloride 0.9%. 1000 milliLiter(s) (100 mL/Hr) IV Continuous <Continuous>    MEDICATIONS  (PRN):

## 2022-10-04 NOTE — PROGRESS NOTE ADULT - PROBLEM SELECTOR PROBLEM 1
Toxic metabolic encephalopathy
PTSD (post-traumatic stress disorder)
PTSD (post-traumatic stress disorder)

## 2022-10-04 NOTE — PROGRESS NOTE ADULT - SUBJECTIVE AND OBJECTIVE BOX
Patient is a 22y old  Male who presents with a chief complaint of Altered Mental Status 2/2 Cannibis use (03 Oct 2022 16:07)    pt seen in icu [  ], reg med floor [ x  ], bed [x  ], chair at bedside [   ], a+o x3 [x  ], lethargic [  ],    nad [ x ]        Allergies    No Known Allergies        Vitals    T(F): 97.4 (10-04-22 @ 05:00), Max: 97.8 (10-03-22 @ 14:00)  HR: 107 (10-04-22 @ 05:00) (85 - 107)  BP: 148/99 (10-04-22 @ 05:00) (123/68 - 148/99)  RR: 18 (10-04-22 @ 05:00) (16 - 18)  SpO2: 98% (10-04-22 @ 05:00) (98% - 98%)  Wt(kg): --  CAPILLARY BLOOD GLUCOSE          Labs                          16.1   6.39  )-----------( 362      ( 03 Oct 2022 06:50 )             49.1       10-03    140  |  106  |  12  ----------------------------<  96  4.0   |  24  |  0.86    Ca    10.2      03 Oct 2022 06:50  Phos  3.5     10-03  Mg     2.5     10-03    TPro  8.4<H>  /  Alb  3.8  /  TBili  0.4  /  DBili  x   /  AST  44<H>  /  ALT  71<H>  /  AlkPhos  71  10-03                Radiology Results      Meds    MEDICATIONS  (STANDING):  ARIPiprazole 5 milliGRAM(s) Oral daily  enoxaparin Injectable 40 milliGRAM(s) SubCutaneous every 24 hours  escitalopram 10 milliGRAM(s) Oral daily  melatonin 5 milliGRAM(s) Oral at bedtime  sodium chloride 0.9%. 1000 milliLiter(s) (100 mL/Hr) IV Continuous <Continuous>      MEDICATIONS  (PRN):      Physical Exam    Neuro :  no focal deficits  Respiratory: CTA B/L  CV: RRR, S1S2, no murmurs,   Abdominal: Soft, NT, ND +BS,  Extremities: No edema, + peripheral pulses    ASSESSMENT    Altered mental status 2nd to Toxic metabolic encephalopathy. Cannabis intoxication   F12.929  Psychotic disorder   F29  h/o PTSD    PLAN    neuro follow up   If mental status remains normal follow up for  ambulatory EEG and Neuropsychology tests in outpatient  psych cons   d/c risperidone  d/c olanzapine  d/c haldol  start abilify 5mg PO Qam  start escitalopram 10mg PO Qam  no indication for involuntary psychiatric hospitalization  please assure psychiatric follow-up on discharge   evaluation for referral to drug detox program  cont current meds       Patient is a 22y old  Male who presents with a chief complaint of Altered Mental Status 2/2 Cannibis use (03 Oct 2022 16:07)    pt seen in icu [  ], reg med floor [ x  ], bed [x  ], chair at bedside [   ], a+o x3 [x  ], lethargic [  ],    nad [ x ]        Allergies    No Known Allergies        Vitals    T(F): 97.4 (10-04-22 @ 05:00), Max: 97.8 (10-03-22 @ 14:00)  HR: 107 (10-04-22 @ 05:00) (85 - 107)  BP: 148/99 (10-04-22 @ 05:00) (123/68 - 148/99)  RR: 18 (10-04-22 @ 05:00) (16 - 18)  SpO2: 98% (10-04-22 @ 05:00) (98% - 98%)  Wt(kg): --  CAPILLARY BLOOD GLUCOSE          Labs                          16.1   6.39  )-----------( 362      ( 03 Oct 2022 06:50 )             49.1       10-03    140  |  106  |  12  ----------------------------<  96  4.0   |  24  |  0.86    Ca    10.2      03 Oct 2022 06:50  Phos  3.5     10-03  Mg     2.5     10-03    TPro  8.4<H>  /  Alb  3.8  /  TBili  0.4  /  DBili  x   /  AST  44<H>  /  ALT  71<H>  /  AlkPhos  71  10-03                Radiology Results      Meds    MEDICATIONS  (STANDING):  ARIPiprazole 5 milliGRAM(s) Oral daily  enoxaparin Injectable 40 milliGRAM(s) SubCutaneous every 24 hours  escitalopram 10 milliGRAM(s) Oral daily  melatonin 5 milliGRAM(s) Oral at bedtime  sodium chloride 0.9%. 1000 milliLiter(s) (100 mL/Hr) IV Continuous <Continuous>      MEDICATIONS  (PRN):      Physical Exam    Neuro :  no focal deficits  Respiratory: CTA B/L  CV: RRR, S1S2, no murmurs,   Abdominal: Soft, NT, ND +BS,  Extremities: No edema, + peripheral pulses    ASSESSMENT    Altered mental status 2nd to Toxic metabolic encephalopathy. Cannabis intoxication   F12.929  Psychotic disorder   F29  h/o PTSD    PLAN    neuro follow up   If mental status remains normal follow up for  ambulatory EEG and Neuropsychology tests in outpatient  psych cons noted   cont abilify 5mg PO Qam  cont escitalopram 10mg PO Qam  no indication for involuntary psychiatric hospitalization  please assure psychiatric follow-up on discharge   evaluation for referral to drug detox program  cont current meds   d/c planning

## 2022-10-04 NOTE — SBIRT NOTE ADULT - NSSBIRTDRGBRIEFINTDET_GEN_A_CORE
Counseling and education provided around patient's drug usage.   Patient is interested in treatment and wanted referral made to  Out -patient  mental health centers.

## 2022-10-05 ENCOUNTER — TRANSCRIPTION ENCOUNTER (OUTPATIENT)
Age: 22
End: 2022-10-05

## 2022-10-05 VITALS
TEMPERATURE: 99 F | OXYGEN SATURATION: 99 % | SYSTOLIC BLOOD PRESSURE: 131 MMHG | DIASTOLIC BLOOD PRESSURE: 81 MMHG | HEART RATE: 101 BPM | RESPIRATION RATE: 16 BRPM

## 2022-10-05 LAB — SARS-COV-2 RNA SPEC QL NAA+PROBE: SIGNIFICANT CHANGE UP

## 2022-10-05 PROCEDURE — 99285 EMERGENCY DEPT VISIT HI MDM: CPT

## 2022-10-05 PROCEDURE — 70450 CT HEAD/BRAIN W/O DYE: CPT | Mod: MA

## 2022-10-05 PROCEDURE — 80307 DRUG TEST PRSMV CHEM ANLYZR: CPT

## 2022-10-05 PROCEDURE — 81001 URINALYSIS AUTO W/SCOPE: CPT

## 2022-10-05 PROCEDURE — 85025 COMPLETE CBC W/AUTO DIFF WBC: CPT

## 2022-10-05 PROCEDURE — 85027 COMPLETE CBC AUTOMATED: CPT

## 2022-10-05 PROCEDURE — 36415 COLL VENOUS BLD VENIPUNCTURE: CPT

## 2022-10-05 PROCEDURE — 84443 ASSAY THYROID STIM HORMONE: CPT

## 2022-10-05 PROCEDURE — 83735 ASSAY OF MAGNESIUM: CPT

## 2022-10-05 PROCEDURE — U0003: CPT

## 2022-10-05 PROCEDURE — 84100 ASSAY OF PHOSPHORUS: CPT

## 2022-10-05 PROCEDURE — 87635 SARS-COV-2 COVID-19 AMP PRB: CPT

## 2022-10-05 PROCEDURE — 96372 THER/PROPH/DIAG INJ SC/IM: CPT

## 2022-10-05 PROCEDURE — 80053 COMPREHEN METABOLIC PANEL: CPT

## 2022-10-05 PROCEDURE — 71045 X-RAY EXAM CHEST 1 VIEW: CPT

## 2022-10-05 PROCEDURE — U0005: CPT

## 2022-10-05 RX ADMIN — ARIPIPRAZOLE 5 MILLIGRAM(S): 15 TABLET ORAL at 12:35

## 2022-10-05 RX ADMIN — ESCITALOPRAM OXALATE 10 MILLIGRAM(S): 10 TABLET, FILM COATED ORAL at 12:35

## 2022-10-05 NOTE — DISCHARGE NOTE NURSING/CASE MANAGEMENT/SOCIAL WORK - PATIENT PORTAL LINK FT
You can access the FollowMyHealth Patient Portal offered by [facility] by registering at the following website: [email or web address]. By joining [facility]’s FollowMyHealth portal, you will also be able to view your health information using other applications (apps) compatible with our system.

## 2022-10-05 NOTE — DISCHARGE NOTE NURSING/CASE MANAGEMENT/SOCIAL WORK - NSTRANSFERBELONGINGSDISPO_GEN_A_NUR
Patient: Dinora Mcghee    Procedure: Procedure(s):  REPLACEMENT, GASTROSTOMY TUBE, WITHOUT ENDOSCOPY       Diagnosis:Encounter for care related to feeding tube [Z46.59]  Diagnosis Additional Information: No value filed.    Anesthesia Type:  MAC    Note:  Disposition: Outpatient   Postop Pain Control: Uneventful            Sign Out: Well controlled pain   PONV: No   Neuro/Psych: Uneventful            Sign Out: Acceptable/Baseline neuro status   Airway/Respiratory: Uneventful            Sign Out: Acceptable/Baseline resp. status   CV/Hemodynamics: Uneventful            Sign Out: Acceptable CV status; No obvious hypovolemia; No obvious fluid overload   Other NRE: NONE   DID A NON-ROUTINE EVENT OCCUR? No           Last vitals:  Vitals Value Taken Time   /78 10/19/21 1415   Temp 36.5  C (97.7  F) 10/19/21 1345   Pulse 83 10/19/21 1415   Resp 14 10/19/21 1415   SpO2 98 % 10/19/21 1415       Electronically Signed By: Saleem Barney MD  October 19, 2021  3:34 PM  
with patient

## 2022-10-05 NOTE — PROGRESS NOTE ADULT - REASON FOR ADMISSION
Altered Mental Status 2/2 Cannibis use

## 2022-10-05 NOTE — PROGRESS NOTE ADULT - SUBJECTIVE AND OBJECTIVE BOX
Patient is a 22y old  Male who presents with a chief complaint of Altered Mental Status 2/2 Cannibis use (04 Oct 2022 16:58)    pt seen in icu [  ], reg med floor [ x  ], bed [x  ], chair at bedside [   ], a+o x3 [x  ], lethargic [  ],    nad [ x ]      Allergies    No Known Allergies        Vitals    T(F): 98.5 (10-05-22 @ 05:31), Max: 98.5 (10-05-22 @ 05:31)  HR: 110 (10-05-22 @ 05:31) (102 - 110)  BP: 132/82 (10-05-22 @ 05:31) (120/75 - 132/82)  RR: 17 (10-05-22 @ 05:31) (16 - 18)  SpO2: 97% (10-05-22 @ 05:31) (97% - 99%)  Wt(kg): --  CAPILLARY BLOOD GLUCOSE          Labs                          16.1   6.39  )-----------( 362      ( 03 Oct 2022 06:50 )             49.1       10-03    140  |  106  |  12  ----------------------------<  96  4.0   |  24  |  0.86    Ca    10.2      03 Oct 2022 06:50  Phos  3.5     10-03  Mg     2.5     10-03    TPro  8.4<H>  /  Alb  3.8  /  TBili  0.4  /  DBili  x   /  AST  44<H>  /  ALT  71<H>  /  AlkPhos  71  10-03                Radiology Results      Meds    MEDICATIONS  (STANDING):  ARIPiprazole 5 milliGRAM(s) Oral daily  enoxaparin Injectable 40 milliGRAM(s) SubCutaneous every 24 hours  escitalopram 10 milliGRAM(s) Oral daily  melatonin 5 milliGRAM(s) Oral at bedtime  sodium chloride 0.9%. 1000 milliLiter(s) (100 mL/Hr) IV Continuous <Continuous>      MEDICATIONS  (PRN):      Physical Exam    Neuro :  no focal deficits  Respiratory: CTA B/L  CV: RRR, S1S2, no murmurs,   Abdominal: Soft, NT, ND +BS,  Extremities: No edema, + peripheral pulses    ASSESSMENT    Altered mental status 2nd to Toxic metabolic encephalopathy. Cannabis intoxication   F12.929  Psychotic disorder   F29  h/o PTSD    PLAN    neuro follow up   If mental status remains normal follow up for  ambulatory EEG and Neuropsychology tests in outpatient  psych cons noted   cont abilify 5mg PO Qam  cont escitalopram 10mg PO Qam  no indication for involuntary psychiatric hospitalization  please assure psychiatric follow-up on discharge   evaluation for referral to drug detox program  cont current meds   d/c planning

## 2022-10-05 NOTE — DISCHARGE NOTE NURSING/CASE MANAGEMENT/SOCIAL WORK - NSDCPEFALRISK_GEN_ALL_CORE
For information on Fall & Injury Prevention, visit: [email or web address] OR  [email or web address] OR  https://www.cdc.gov/steadi/patient.html

## 2022-10-07 ENCOUNTER — OUTPATIENT (OUTPATIENT)
Dept: OUTPATIENT SERVICES | Facility: HOSPITAL | Age: 22
LOS: 1 days | Discharge: STILL ADMITTED OR ADMINISTRATIVE | End: 2022-10-07
Payer: COMMERCIAL

## 2022-10-07 ENCOUNTER — INPATIENT (INPATIENT)
Facility: HOSPITAL | Age: 22
LOS: 11 days | Discharge: HOME | End: 2022-10-19
Attending: PSYCHIATRY & NEUROLOGY | Admitting: PSYCHIATRY & NEUROLOGY

## 2022-10-07 VITALS
SYSTOLIC BLOOD PRESSURE: 145 MMHG | RESPIRATION RATE: 18 BRPM | HEART RATE: 102 BPM | TEMPERATURE: 99 F | HEIGHT: 67 IN | OXYGEN SATURATION: 99 % | DIASTOLIC BLOOD PRESSURE: 89 MMHG

## 2022-10-07 DIAGNOSIS — F29 UNSPECIFIED PSYCHOSIS NOT DUE TO A SUBSTANCE OR KNOWN PHYSIOLOGICAL CONDITION: ICD-10-CM

## 2022-10-07 PROBLEM — F12.988: Chronic | Status: ACTIVE | Noted: 2022-10-03

## 2022-10-07 PROBLEM — F43.10 POST-TRAUMATIC STRESS DISORDER, UNSPECIFIED: Chronic | Status: ACTIVE | Noted: 2022-10-03

## 2022-10-07 LAB
ALBUMIN SERPL ELPH-MCNC: 5 G/DL — SIGNIFICANT CHANGE UP (ref 3.3–5)
ALP SERPL-CCNC: 86 U/L — SIGNIFICANT CHANGE UP (ref 40–120)
ALT FLD-CCNC: 69 U/L — HIGH (ref 4–41)
AMPHET UR-MCNC: NEGATIVE — SIGNIFICANT CHANGE UP
ANION GAP SERPL CALC-SCNC: 14 MMOL/L — SIGNIFICANT CHANGE UP (ref 7–14)
APAP SERPL-MCNC: <10 UG/ML — LOW (ref 15–25)
APPEARANCE UR: ABNORMAL
AST SERPL-CCNC: 33 U/L — SIGNIFICANT CHANGE UP (ref 4–40)
BACTERIA # UR AUTO: NEGATIVE — SIGNIFICANT CHANGE UP
BARBITURATES UR SCN-MCNC: NEGATIVE — SIGNIFICANT CHANGE UP
BASOPHILS # BLD AUTO: 0.06 K/UL — SIGNIFICANT CHANGE UP (ref 0–0.2)
BASOPHILS NFR BLD AUTO: 0.6 % — SIGNIFICANT CHANGE UP (ref 0–2)
BENZODIAZ UR-MCNC: NEGATIVE — SIGNIFICANT CHANGE UP
BILIRUB SERPL-MCNC: 0.3 MG/DL — SIGNIFICANT CHANGE UP (ref 0.2–1.2)
BILIRUB UR-MCNC: NEGATIVE — SIGNIFICANT CHANGE UP
BUN SERPL-MCNC: 14 MG/DL — SIGNIFICANT CHANGE UP (ref 7–23)
CALCIUM SERPL-MCNC: 9.8 MG/DL — SIGNIFICANT CHANGE UP (ref 8.4–10.5)
CHLORIDE SERPL-SCNC: 100 MMOL/L — SIGNIFICANT CHANGE UP (ref 98–107)
CO2 SERPL-SCNC: 21 MMOL/L — LOW (ref 22–31)
COCAINE METAB.OTHER UR-MCNC: NEGATIVE — SIGNIFICANT CHANGE UP
COD CRY URNS QL: ABNORMAL
COLOR SPEC: YELLOW — SIGNIFICANT CHANGE UP
COMMENT - URINE: SIGNIFICANT CHANGE UP
CREAT SERPL-MCNC: 0.85 MG/DL — SIGNIFICANT CHANGE UP (ref 0.5–1.3)
CREATININE URINE RESULT, DAU: 397 MG/DL — SIGNIFICANT CHANGE UP
DIFF PNL FLD: NEGATIVE — SIGNIFICANT CHANGE UP
EGFR: 124 ML/MIN/1.73M2 — SIGNIFICANT CHANGE UP
EOSINOPHIL # BLD AUTO: 0.09 K/UL — SIGNIFICANT CHANGE UP (ref 0–0.5)
EOSINOPHIL NFR BLD AUTO: 0.9 % — SIGNIFICANT CHANGE UP (ref 0–6)
EPI CELLS # UR: 1 /HPF — SIGNIFICANT CHANGE UP (ref 0–5)
ETHANOL SERPL-MCNC: <10 MG/DL — SIGNIFICANT CHANGE UP
FLUAV AG NPH QL: SIGNIFICANT CHANGE UP
FLUBV AG NPH QL: SIGNIFICANT CHANGE UP
GLUCOSE SERPL-MCNC: 108 MG/DL — HIGH (ref 70–99)
GLUCOSE UR QL: NEGATIVE — SIGNIFICANT CHANGE UP
HCT VFR BLD CALC: 50.7 % — HIGH (ref 39–50)
HGB BLD-MCNC: 16.3 G/DL — SIGNIFICANT CHANGE UP (ref 13–17)
HYALINE CASTS # UR AUTO: 1 /LPF — SIGNIFICANT CHANGE UP (ref 0–7)
IANC: 7.06 K/UL — SIGNIFICANT CHANGE UP (ref 1.8–7.4)
IMM GRANULOCYTES NFR BLD AUTO: 0.3 % — SIGNIFICANT CHANGE UP (ref 0–0.9)
KETONES UR-MCNC: ABNORMAL
LEUKOCYTE ESTERASE UR-ACNC: NEGATIVE — SIGNIFICANT CHANGE UP
LYMPHOCYTES # BLD AUTO: 2.23 K/UL — SIGNIFICANT CHANGE UP (ref 1–3.3)
LYMPHOCYTES # BLD AUTO: 21.3 % — SIGNIFICANT CHANGE UP (ref 13–44)
MCHC RBC-ENTMCNC: 27.3 PG — SIGNIFICANT CHANGE UP (ref 27–34)
MCHC RBC-ENTMCNC: 32.1 GM/DL — SIGNIFICANT CHANGE UP (ref 32–36)
MCV RBC AUTO: 85.1 FL — SIGNIFICANT CHANGE UP (ref 80–100)
METHADONE UR-MCNC: NEGATIVE — SIGNIFICANT CHANGE UP
MONOCYTES # BLD AUTO: 0.98 K/UL — HIGH (ref 0–0.9)
MONOCYTES NFR BLD AUTO: 9.4 % — SIGNIFICANT CHANGE UP (ref 2–14)
NEUTROPHILS # BLD AUTO: 7.06 K/UL — SIGNIFICANT CHANGE UP (ref 1.8–7.4)
NEUTROPHILS NFR BLD AUTO: 67.5 % — SIGNIFICANT CHANGE UP (ref 43–77)
NITRITE UR-MCNC: NEGATIVE — SIGNIFICANT CHANGE UP
NRBC # BLD: 0 /100 WBCS — SIGNIFICANT CHANGE UP (ref 0–0)
NRBC # FLD: 0 K/UL — SIGNIFICANT CHANGE UP (ref 0–0)
OPIATES UR-MCNC: NEGATIVE — SIGNIFICANT CHANGE UP
OXYCODONE UR-MCNC: NEGATIVE — SIGNIFICANT CHANGE UP
PCP SPEC-MCNC: SIGNIFICANT CHANGE UP
PCP UR-MCNC: NEGATIVE — SIGNIFICANT CHANGE UP
PH UR: 6 — SIGNIFICANT CHANGE UP (ref 5–8)
PLATELET # BLD AUTO: 429 K/UL — HIGH (ref 150–400)
POTASSIUM SERPL-MCNC: 4.2 MMOL/L — SIGNIFICANT CHANGE UP (ref 3.5–5.3)
POTASSIUM SERPL-SCNC: 4.2 MMOL/L — SIGNIFICANT CHANGE UP (ref 3.5–5.3)
PROT SERPL-MCNC: 8.8 G/DL — HIGH (ref 6–8.3)
PROT UR-MCNC: ABNORMAL
RBC # BLD: 5.96 M/UL — HIGH (ref 4.2–5.8)
RBC # FLD: 14.4 % — SIGNIFICANT CHANGE UP (ref 10.3–14.5)
RBC CASTS # UR COMP ASSIST: 1 /HPF — SIGNIFICANT CHANGE UP (ref 0–4)
RSV RNA NPH QL NAA+NON-PROBE: SIGNIFICANT CHANGE UP
SALICYLATES SERPL-MCNC: <0.3 MG/DL — LOW (ref 15–30)
SARS-COV-2 RNA SPEC QL NAA+PROBE: SIGNIFICANT CHANGE UP
SODIUM SERPL-SCNC: 135 MMOL/L — SIGNIFICANT CHANGE UP (ref 135–145)
SP GR SPEC: 1.04 — SIGNIFICANT CHANGE UP (ref 1.01–1.05)
THC UR QL: POSITIVE
TOXICOLOGY SCREEN, DRUGS OF ABUSE, SERUM RESULT: SIGNIFICANT CHANGE UP
TSH SERPL-MCNC: 1.35 UIU/ML — SIGNIFICANT CHANGE UP (ref 0.27–4.2)
UROBILINOGEN FLD QL: ABNORMAL
WBC # BLD: 10.45 K/UL — SIGNIFICANT CHANGE UP (ref 3.8–10.5)
WBC # FLD AUTO: 10.45 K/UL — SIGNIFICANT CHANGE UP (ref 3.8–10.5)
WBC UR QL: 2 /HPF — SIGNIFICANT CHANGE UP (ref 0–5)

## 2022-10-07 PROCEDURE — 99285 EMERGENCY DEPT VISIT HI MDM: CPT

## 2022-10-07 PROCEDURE — 93010 ELECTROCARDIOGRAM REPORT: CPT | Mod: NC

## 2022-10-07 PROCEDURE — 90839 PSYTX CRISIS INITIAL 60 MIN: CPT | Mod: NC,1L

## 2022-10-07 RX ORDER — ARIPIPRAZOLE 15 MG/1
10 TABLET ORAL DAILY
Refills: 0 | Status: DISCONTINUED | OUTPATIENT
Start: 2022-10-07 | End: 2022-10-10

## 2022-10-07 RX ORDER — DIPHENHYDRAMINE HCL 50 MG
50 CAPSULE ORAL ONCE
Refills: 0 | Status: DISCONTINUED | OUTPATIENT
Start: 2022-10-07 | End: 2022-10-19

## 2022-10-07 RX ORDER — HALOPERIDOL DECANOATE 100 MG/ML
5 INJECTION INTRAMUSCULAR EVERY 6 HOURS
Refills: 0 | Status: DISCONTINUED | OUTPATIENT
Start: 2022-10-07 | End: 2022-10-19

## 2022-10-07 RX ORDER — NICOTINE POLACRILEX 2 MG
2 GUM BUCCAL
Refills: 0 | Status: DISCONTINUED | OUTPATIENT
Start: 2022-10-07 | End: 2022-10-19

## 2022-10-07 RX ORDER — HALOPERIDOL DECANOATE 100 MG/ML
5 INJECTION INTRAMUSCULAR ONCE
Refills: 0 | Status: DISCONTINUED | OUTPATIENT
Start: 2022-10-07 | End: 2022-10-19

## 2022-10-07 RX ORDER — LANOLIN ALCOHOL/MO/W.PET/CERES
5 CREAM (GRAM) TOPICAL AT BEDTIME
Refills: 0 | Status: DISCONTINUED | OUTPATIENT
Start: 2022-10-07 | End: 2022-10-19

## 2022-10-07 RX ORDER — DIPHENHYDRAMINE HCL 50 MG
50 CAPSULE ORAL EVERY 6 HOURS
Refills: 0 | Status: DISCONTINUED | OUTPATIENT
Start: 2022-10-07 | End: 2022-10-19

## 2022-10-07 RX ADMIN — Medication 5 MILLIGRAM(S): at 21:03

## 2022-10-07 NOTE — BH PATIENT PROFILE - HOME MEDICATIONS
melatonin 5 mg oral tablet , 1 tab(s) orally once a day (at bedtime)  ARIPiprazole 5 mg oral tablet , 1 tab(s) orally once a day  escitalopram 10 mg oral tablet , 1 tab(s) orally once a day

## 2022-10-07 NOTE — ED BEHAVIORAL HEALTH ASSESSMENT NOTE - SUMMARY
22M, domiciled with family, employed in security, with 1 prior psych admission on the adolescent unit for psychosis, no known prior suicide attempt or violence, regular cannabis use, no known withdrawal, no known legal issues, PMH hx of bilateral hearing loss at birth, recent medical admission at [facility] for 'cannabis intoxication delirium' discharged 2 days ago, referred by Crisis Center, where family brought patient for evaluation due to persistent psychosis, to assess need for involuntary hospitalization.   On exam pt presents with auditory hallucinations, response to internal stimuli, thought manipulation delusions, elevated and labile mood. Per family is highly disorganized and not sleeping. Patient is unable to care for self due to severity of symptoms, warranting involuntary admission.

## 2022-10-07 NOTE — ED BEHAVIORAL HEALTH ASSESSMENT NOTE - PSYCHIATRIC ISSUES AND PLAN (INCLUDE STANDING AND PRN MEDICATION)
increase abilify to 10mg, hold lexapro due to some manic features on exam, melatonin 5. haldol 5 atican 2 benadryl 50

## 2022-10-07 NOTE — ED ADULT NURSE NOTE - CHIEF COMPLAINT QUOTE
Patient has a visual presentation. Pt takes Lexapro and Abilify and is compliant. Pt was discharged recently after he had an episode where he was psychotic. Pt has a history of psychosis.

## 2022-10-07 NOTE — ED PROVIDER NOTE - OBJECTIVE STATEMENT
22 yr old male with hx of psychotic episode as adolescent, was admitted  to [facility] 9/30 for psychosis vs metabolic encephalopathy, seen by neuro, medicine and psych labs ant CT unremarkable  except for  pos THC, Discharged 10/5   on Abilify  and Lexapro, taking meds as per sister. Patient oriented to place and time, hearing voices,  Patient feels fine, denies SI or HI.

## 2022-10-07 NOTE — ED BEHAVIORAL HEALTH ASSESSMENT NOTE - RISK ASSESSMENT
Low Acute Suicide Risk acute risks include delusions, insomnia, AH. chronic risks include prior admission, cannabis use. protective factors include no SI/HI, no CAH, no violence history, no known access to weapons. low imminent suicide risk but due to severity of psychotic and mood symptoms cannot care for self at present, warranting inpatient level of care on involuntary basis, will mitigate risk further with supervision, medication management and milieu therapy.

## 2022-10-07 NOTE — ED ADULT NURSE NOTE - OBJECTIVE STATEMENT
Received pt in  pt Received pt in  pt disorganized talking to self pt denies si/hi presently labs collected safety & comfort measures maintained eval on going.

## 2022-10-07 NOTE — ED BEHAVIORAL HEALTH ASSESSMENT NOTE - HPI (INCLUDE ILLNESS QUALITY, SEVERITY, DURATION, TIMING, CONTEXT, MODIFYING FACTORS, ASSOCIATED SIGNS AND SYMPTOMS)
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   Patient observed sitting in the chair, mumbling to self incoherently. He reluctantly followed writer into a room for private interview, did not sit on the bed. Speech was largely unintelligible, rapid and incoherent. At one point pt grabbed his head and stated "too many voices" but then looked at writer and stated "they are gone now". He denies CAH, denies suicidal ideation and homicidal ideation. Writer confronted pt on symptoms reported from crisis center (that he is a doctor). 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 When asked if he uses cannabis, he states "rema mart", denies drinking today.  From [facility] at [facility]: "Patient presents as incoherent and illogical, confused, speech is mumbled at time. He believes he is current home or in a conference room, he is distracted and c/o of AH. At times thinks his sister is a nurse. He believes he is a neurosurgeon and making gestures as if he is performing a medical procedure. He talks about being  and his wife is “Tamara Valdez.” If shown picture of family members, he is unable to identify them at times. At night, he is pacing, doing repetitive behaviors, taking multiple showers – showered 4x last night. He mumbles often, reports some agitation when reality tested but is not violent. He reports he is unable to sleep due to telekinesis and hallucination. His family has been taking time off from work to be with him, worried he may wonder away and get lost if left alone. 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   Patient observed sitting in the chair, mumbling to self incoherently. He reluctantly followed writer into a room for private interview, did not sit on the bed. Speech was largely unintelligible, rapid and incoherent. At one point pt grabbed his head and stated "too many voices" but then looked at writer with intense eye contact and stated "they are gone now". He denies CAH, denies suicidal ideation and homicidal ideation. Writer confronted pt on symptoms reported from crisis center (ie that he is a doctor and ). 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 When asked if he uses cannabis, he states "rema mart", denies drinking today.  From [facility] at [facility]: "Patient presents as incoherent and illogical, confused, speech is mumbled at time. He believes he is current home or in a conference room, he is distracted and c/o of AH. At times thinks his sister is a nurse. He believes he is a neurosurgeon and making gestures as if he is performing a medical procedure. He talks about being  and his wife is “Tamara Valdez.” If shown picture of family members, he is unable to identify them at times. At night, he is pacing, doing repetitive behaviors, taking multiple showers – showered 4x last night. He mumbles often, reports some agitation when reality tested but is not violent. He reports he is unable to sleep due to telekinesis and hallucination. His family has been taking time off from work to be with him, worried he may wonder away and get lost if left alone. They feel they can no longer manage him at home."
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
## 2022-10-07 NOTE — ED PROVIDER NOTE - PHYSICAL EXAMINATION
***GEN - well appearing; NAD   ***HEAD - NC/AT  ***EYES/NOSE - PEERL, EOMI, mucous membranes moist, no discharge   ***THROAT: Oral cavity and pharynx normal. No inflammation, swelling, exudate, or lesions.    ***NECK: supple, non-tender no lymphadenopathy  ***PULMONARY - CTA b/l, symmetric breath sounds.   ***CARDIAC- s1s2, RRR, no murmur  ***ABDOMEN - +BS, ND, NT, soft, no guarding, no rebound, no organomegaly  ***BACK - no CVA tenderness, Normal  spine, no spinal TTP  ***EXTREMITIES - symmetric pulses, 2+ dp, capillary refill < 2 seconds, no clubbing, no cyanosis, no edema   ***SKIN - warm, dry, intact, no rash or bruising   ***NEUROLOGIC - a&o x3, CN 3-12 intact, sensation nl, motor 5/5 RUE/LUE/RLE/LLE gait nl,   ***PSYCH , memory nl, affect nl, auditory hallucinations

## 2022-10-07 NOTE — ED BEHAVIORAL HEALTH ASSESSMENT NOTE - PAST PSYCHOTROPIC MEDICATION
took one day of Risperdal 4 and Zyprexa 5 took one day of Risperdal 4 and Zyprexa 5. Then on Lexapro 10 and Abilify 5 and Melatonin 5

## 2022-10-07 NOTE — ED PROVIDER NOTE - CLINICAL SUMMARY MEDICAL DECISION MAKING FREE TEXT BOX
22 yyear old male with acute psychosis , with recent  medical admission for w/u with worsening of auditory hallucinations and delusions, no SI or HI 22 year old male with acute psychosis , with recent  medical admission for w/u with worsening of auditory hallucinations and delusions, no SI or HI

## 2022-10-07 NOTE — BH PATIENT PROFILE - FALL HARM RISK - UNIVERSAL INTERVENTIONS
Bed in lowest position, wheels locked, appropriate side rails in place/Call bell, personal items and telephone in reach/Instruct patient to call for assistance before getting out of bed or chair/Non-slip footwear when patient is out of bed/[place] to call system/Physically safe environment - no spills, clutter or unnecessary equipment/Purposeful Proactive Rounding/Room/bathroom lighting operational, light cord in reach

## 2022-10-07 NOTE — ED BEHAVIORAL HEALTH NOTE - BEHAVIORAL HEALTH NOTE
COVID Exposure Screen- Patient  1.	*Have you had a COVID-19 test in the last 90 days?  ( X ) Yes   (  ) No   (  ) Unknown- Reason: _____  IF YES PROCEED TO QUESTION #2. IF NO OR UNKNOWN, PLEASE SKIP TO QUESTION #3.  2.	Date of test(s) and result(s): NEGATIVE  3.	*Have you tested positive for COVID-19 antibodies? (  ) Yes   ( X ) No   (  ) Unknown- Reason: _____  IF YES PROCEED TO QUESTION #4. IF NO or UNKNOWN, PLEASE SKIP TO QUESTION #5.  4.	Date of positive antibody test: ________  5.	*Have you received 2 doses of the COVID-19 vaccine? ( X ) Yes   (  ) No   (  ) Unknown- Reason: _____   IF YES PROCEED TO QUESTION #6. IF NO or UNKNOWN, PLEASE SKIP TO QUESTION #7.  6.	Date of second dose: ________  7.	*In the past 10 days, have you been around anyone with a positive COVID-19 test?* (  ) Yes   ( X ) No   (  ) Unknown- Reason: ____  IF YES PROCEED TO QUESTION #8. IF NO or UNKNOWN, PLEASE SKIP TO QUESTION #13.  8.	Were you within 6 feet of them for at least 15 minutes? (  ) Yes   (  ) No   (  ) Unknown- Reason: _____  9.	Have you provided care for them? (  ) Yes   (  ) No   (  ) Unknown- Reason: ______  10.	Have you had direct physical contact with them (touched, hugged, or kissed them)? (  ) Yes   (  ) No    (  ) Unknown- Reason: _____  11.	Have you shared eating or drinking utensils with them? (  ) Yes   (  ) No    (  ) Unknown- Reason: ____  12.	Have they sneezed, coughed, or somehow gotten respiratory droplets on you? (  ) Yes   (  ) No    (  ) Unknown- Reason: ______  13.	*Have you been out of [place] within the past 10 days?* (  ) Yes   ( X ) No   (  ) Unknown- Reason: _____  IF YES PLEASE ANSWER THE FOLLOWING QUESTIONS:  14.	Which state/country have you been to? ______  15.	Were you there over 24 hours? (  ) Yes   (  ) No    (  ) Unknown- Reason: ______  16.	Date of return to [facility]: ______

## 2022-10-07 NOTE — ED BEHAVIORAL HEALTH ASSESSMENT NOTE - DESCRIPTION
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

## 2022-10-07 NOTE — BH PATIENT PROFILE - STATED REASON FOR ADMISSION
23YO male admitted with a dx of Psychotic Disorder.  Pt. was recently discharged from [facility] for "cannabis intoxication delirium" 2 days ago.  Since discharge pt. has having worsening psychotic sxs; responding to internal stimuli, believes he has gallardo of telekinesis, delusional thoughts that his family owns [employer], disorganized and circumstantial thought pattern.  PMH includes partial hearing loss since childhood.

## 2022-10-07 NOTE — ED ADULT TRIAGE NOTE - CHIEF COMPLAINT QUOTE
Patient has a visual presentation. Pt takes Lexapro and Abilify and is compliant. Patient has a visual presentation. Pt takes Lexapro and Abilify and is compliant. Pt was discharged recently after he had an episode where he was psychotic. Pt has a history of psychosis.

## 2022-10-07 NOTE — ED BEHAVIORAL HEALTH ASSESSMENT NOTE - VIOLENCE PROTECTIVE FACTORS:
Residential stability/Relationship stability/Employment stability/Good treatment response/compliance

## 2022-10-08 DIAGNOSIS — F12.10 CANNABIS ABUSE, UNCOMPLICATED: ICD-10-CM

## 2022-10-08 LAB
COVID-19 SPIKE DOMAIN AB INTERP: POSITIVE
COVID-19 SPIKE DOMAIN ANTIBODY RESULT: >250 U/ML — HIGH
SARS-COV-2 IGG+IGM SERPL QL IA: >250 U/ML — HIGH
SARS-COV-2 IGG+IGM SERPL QL IA: POSITIVE

## 2022-10-08 PROCEDURE — 99222 1ST HOSP IP/OBS MODERATE 55: CPT

## 2022-10-08 RX ADMIN — Medication 5 MILLIGRAM(S): at 20:22

## 2022-10-08 RX ADMIN — ARIPIPRAZOLE 10 MILLIGRAM(S): 15 TABLET ORAL at 08:02

## 2022-10-08 NOTE — BH INPATIENT PSYCHIATRY ASSESSMENT NOTE - NSBHASSESSSUMMFT_PSY_ALL_CORE
22 year old single male.  Patient has 1 prior psych admission on the adolescent unit for psychosis.  One admission in 2014 at [facility] for bizarre behavior - had significant work up including LP, EEG. received outpatient tx with [facility] until pt tapered off medication in 2018.  Patient reports cannabis use.  Patient is hospitalized with a primary problem of psychotic decompensation.  Patient admitted to [facility] on a 9.39 legal status.    Plan:  >Legal: 9.39  >Obs: Routine, no current SI. no need for CO, patient not expected to pose risk to self or others in controlled inpatient setting  >Psychiatric Meds: Restart medication regimen: Abilify 10mg daily. Observe for tolerability and efficacy. Patient had been poorly adherent prior to admission.     PRN medications:  Ativan 2mg oral Q6HR PRN for agitation and anxiety.  Haldol 5mg oral Q6HR PRN for agitation.   Benadryl 50mg oral Q6HR PRN for agitation.   Vistaril 50mg oral Q6HR PRN for anxiety.  Desyrel 50mg oral QHS PRN for insomnia.   >Labs: Admission labs reviewed, no acute findings. utox +THC/ Labs pending for tomorrow: A1c and Lipid panel.  Hold antipsychotics if QTc >500  >Medical:   No acute concerns. No consultations needed at this time. No indication for CIWA. Patient with consistently stable VS, no visible physical symptoms of withdrawal.   During the course of treatment, will collaborate with medical team to manage medical issues.  >Diet: Regular  >Social: milieu/structured therapy  >Treatment Interventions: Groups and Individual Therapy/CBT, Motivational counseling for substance abuse related issues.   >Dispo: Collateral and dispo planning pending further symptom and medication optimization

## 2022-10-08 NOTE — BH INPATIENT PSYCHIATRY ASSESSMENT NOTE - NSBHCHARTREVIEWVS_PSY_A_CORE FT
Vital Signs Last 24 Hrs  T(C): 36.8 (10-07-22 @ 19:30), Max: 37.1 (10-07-22 @ 15:55)  T(F): 98.2 (10-07-22 @ 19:30), Max: 98.7 (10-07-22 @ 15:55)  HR: 99 (10-07-22 @ 19:30) (99 - 102)  BP: 139/90 (10-07-22 @ 19:30) (139/90 - 145/89)  BP(mean): --  RR: 18 (10-07-22 @ 19:30) (18 - 18)  SpO2: 98% (10-07-22 @ 19:30) (98% - 99%)    Orthostatic VS  10-08-22 @ 06:44  Lying BP: --/-- HR: --  Sitting BP: 132/90 HR: 101  Standing BP: 138/95 HR: 106  Site: --  Mode: --   Vital Signs Last 24 Hrs  T(C): 36.4 (10-08-22 @ 19:22), Max: 36.4 (10-08-22 @ 19:22)  T(F): 97.6 (10-08-22 @ 19:22), Max: 97.6 (10-08-22 @ 19:22)  HR: --  BP: --  BP(mean): --  RR: --  SpO2: --    Orthostatic VS  10-08-22 @ 19:22  Lying BP: --/-- HR: --  Sitting BP: 140/82 HR: 110  Standing BP: 138/86 HR: 112  Site: --  Mode: --  Orthostatic VS  10-08-22 @ 06:44  Lying BP: --/-- HR: --  Sitting BP: 132/90 HR: 101  Standing BP: 138/95 HR: 106  Site: --  Mode: --

## 2022-10-08 NOTE — BH INPATIENT PSYCHIATRY ASSESSMENT NOTE - OTHER
odd relatedness, bizarre unable to tolerate interview, selectively mute during interview Appears internally disturbed selectively mute

## 2022-10-08 NOTE — BH INPATIENT PSYCHIATRY ASSESSMENT NOTE - NSSUICPROTFACT_PSY_ALL_CORE
Responsibility to children, family, or others/Identifies reasons for living/Supportive social network of family or friends/Fear of death or the actual act of killing self/Cultural, spiritual and/or moral attitudes against suicide/Protestant beliefs
Home

## 2022-10-08 NOTE — BH INPATIENT PSYCHIATRY ASSESSMENT NOTE - NSBHMETABOLIC_PSY_ALL_CORE_FT
BMI: BMI (kg/m2): 32.9 (10-07-22 @ 15:55)  HbA1c:   Glucose:   BP: 139/90 (10-07-22 @ 19:30) (139/90 - 145/89)  Lipid Panel:

## 2022-10-08 NOTE — PSYCHIATRIC REHAB INITIAL EVALUATION - NSBHSIGPRIORMED_PSY_ALL_CORE
Pt is also with PMH hx of bilateral hearing loss at birth, recent medical admission at [facility] for 'cannabis intoxication delirium./Yes (Specify)

## 2022-10-08 NOTE — BH INPATIENT PSYCHIATRY ASSESSMENT NOTE - HPI (INCLUDE ILLNESS QUALITY, SEVERITY, DURATION, TIMING, CONTEXT, MODIFYING FACTORS, ASSOCIATED SIGNS AND SYMPTOMS)
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   Patient observed sitting in the chair, mumbling to self incoherently. He reluctantly followed writer into a room for private interview, did not sit on the bed. Speech was largely unintelligible, rapid and incoherent. At one point pt grabbed his head and stated "too many voices" but then looked at writer with intense eye contact and stated "they are gone now". He denies CAH, denies suicidal ideation and homicidal ideation. Writer confronted pt on symptoms reported from crisis center (ie that he is a doctor and ). 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 When asked if he uses cannabis, he states "[place]", denies drinking today.  From [facility] at [facility]: "Patient presents as incoherent and illogical, confused, speech is mumbled at time. He believes he is current home or in a conference room, he is distracted and c/o of AH. At times thinks his sister is a nurse. He believes he is a neurosurgeon and making gestures as if he is performing a medical procedure. He talks about being  and his wife is “Tamara Valdez.” If shown picture of family members, he is unable to identify them at times. At night, he is pacing, doing repetitive behaviors, taking multiple showers – showered 4x last night. He mumbles often, reports some agitation when reality tested but is not violent. He reports he is unable to sleep due to telekinesis and hallucination. His family has been taking time off from work to be with him, worried he may wonder away and get lost if left alone. They feel they can no longer manage him at home." Patient was seen and evaluated, chart, medications and labs reviewed. Case discussed with nursing team.  On service for this 22 year old single male.  Patient has 1 prior psych admission on the adolescent unit for psychosis.  One admission in  at [facility] for bizarre behavior - had significant work up including LP, EEG. received outpatient tx with [facility] until pt tapered off medication in 2018.  Patient reports cannabis use.  Patient is hospitalized with a primary problem of psychotic decompensation.  Patient admitted to [facility] on a 9.39 legal status. I have reviewed the initial psychiatric assessment in the electronic medical record, including the history of present illness, past psychiatric history, family/social history (no pertinent changes), and exam, and have confirmed the salient findings dated   10/7/22.    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   Patient observed sitting in the chair, mumbling to self incoherently. He reluctantly followed writer into a room for private interview, did not sit on the bed. Speech was largely unintelligible, rapid and incoherent. At one point pt grabbed his head and stated "too many voices" but then looked at writer with intense eye contact and stated "they are gone now". He denies CAH, denies suicidal ideation and homicidal ideation. Writer confronted pt on symptoms reported from crisis center (ie that he is a doctor and ). 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 When asked if he uses cannabis, he states "carolina reaper", denies drinking today.  From [facility] at [facility]: "Patient presents as incoherent and illogical, confused, speech is mumbled at time. He believes he is current home or in a conference room, he is distracted and c/o of . At times thinks his sister is a nurse. He believes he is a neurosurgeon and making gestures as if he is performing a medical procedure. He talks about being  and his wife is “Tamara Valdez.” If shown picture of family members, he is unable to identify them at times. At night, he is pacing, doing repetitive behaviors, taking multiple showers – showered 4x last night. He mumbles often, reports some agitation when reality tested but is not violent. He reports he is unable to sleep due to telekinesis and hallucination. His family has been taking time off from work to be with him, worried he may wonder away and get lost if left alone. They feel they can no longer manage him at home."      On unit: information received from: Chart review and patient interview.  Patient is followed up for psychotic decompensation and cannabis use. Admitting utox +THC, recent medical admission at [place] for 'cannabis intoxication delirium' discharged 2 days ago.  Patient is discussed with nursing staff. Patient took medications last evening. No behavioral concerns, no prns for aggression. However patient is bizarre and odd on unit.  He is observed following peers and staff on unit, running in hallway, bizarre gestures in hallway.   Eating and sleeping well.   Patient is observed in interview room.  He is guarded upon approach, hesitant to engage in interview.  He is notably bizarre, odd, paranoid and suspicious.  When questioned about his mood pt unable to identify any mood symptoms. Patient shaks his head yes.  Patient is asked to clarify pt stares at writer blankly.  Pt selective mute during interview.   Attempted to discuss precipitating events leading to current admission and why he is here . Patient is challenging to interview, poor/limited historian.    During interview patient’s speech was unintelligible, mumbling speech at times but most of interview pt selectively mute.   Appears with perceptual disturbances.

## 2022-10-08 NOTE — BH INPATIENT PSYCHIATRY ASSESSMENT NOTE - CURRENT MEDICATION
MEDICATIONS  (STANDING):  ARIPiprazole 10 milliGRAM(s) Oral daily  melatonin 5 milliGRAM(s) Oral at bedtime    MEDICATIONS  (PRN):  diphenhydrAMINE 50 milliGRAM(s) Oral every 6 hours PRN Extrapyramidal prophylaxis  diphenhydrAMINE Injectable 50 milliGRAM(s) IntraMuscular once PRN Extrapyramidal prophylaxis  haloperidol     Tablet 5 milliGRAM(s) Oral every 6 hours PRN agitation  haloperidol    Injectable 5 milliGRAM(s) IntraMuscular Once PRN agitation  LORazepam     Tablet 2 milliGRAM(s) Oral every 6 hours PRN Anxiety  LORazepam   Injectable 2 milliGRAM(s) IntraMuscular once PRN Agitation  nicotine  Polacrilex Gum 2 milliGRAM(s) Oral every 2 hours PRN breakthrough cravings

## 2022-10-08 NOTE — BH INPATIENT PSYCHIATRY ASSESSMENT NOTE - OTHER PAST PSYCHIATRIC HISTORY (INCLUDE DETAILS REGARDING ONSET, COURSE OF ILLNESS, INPATIENT/OUTPATIENT TREATMENT)
one admission in 2014 at [facility] for bizarre behavior - had significant work up including LP, EEG. received outpatient tx with [facility] until pt tapered off medication in 2018. more recently admitted to medicine for 1 week at [facility] for cannabis intoxication delirium - had one night of Risperdal 4 and Zyprexa 5. Then on Lexapro 10 and Abilify 5 and Melatonin 5. no suicide attempt.

## 2022-10-08 NOTE — BH INPATIENT PSYCHIATRY ASSESSMENT NOTE - NSBHATTESTAPPBILLTIME_PSY_A_CORE
I attest my time as LETY is greater than 50% of the total combined time spent on qualifying patient care activities. I have reviewed and verified the documentation.

## 2022-10-08 NOTE — PSYCHIATRIC REHAB INITIAL EVALUATION - NSBHPRRECOMMEND_PSY_ALL_CORE
Writer met with Pt to orient him to the unit, Psych Rehab depart and its functions. Pt was receptive. Pt was seen in the day area of the unit. 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 Pt reports that he works at [employer] and knows the writer very well for many years. Pt then goes and make repetitive hand gestures and goes on tangents. Writer was unable to engage the pt in a meaningful conversation due to disorganization and illogical thought process. Therefore; this report is based on Pt’s chart. Pt is a 22 year old single male. Pt works as a . PT lives with mother and sister in private house.  One admission in 2014 at [facility] for bizarre behavior - had significant work up including LP, EEG. received outpatient tx with [facility] until pt tapered off medication in 2018.  Patient reports cannabis use. Pt is also with [facility] hx of bilateral hearing loss at birth, recent medical admission at [place] for 'cannabis intoxication delirium' discharged 2 days ago, referred by Crisis Center, where family brought patient for evaluation due to persistent psychosis, to assess need for involuntary hospitalization. Writer was unable to establish a collaborative goal with the Pt to work on over the next seven days due to his disorganization. Therefore, a goal was chosen for her. Psychiatric rehabilitation staff will provide encouragement, support, psychotherapy, and psychoeducation to assist the patient in the progression of his treatment goal.

## 2022-10-08 NOTE — BH INPATIENT PSYCHIATRY ASSESSMENT NOTE - RISK ASSESSMENT
acute risks include delusions, insomnia, AH. chronic risks include prior admission, cannabis use. protective factors include no SI/HI, no CAH, no violence history, no known access to weapons. low imminent suicide risk but due to severity of psychotic and mood symptoms cannot care for self at present, warranting inpatient level of care on involuntary basis, will mitigate risk further with supervision, medication management and milieu therapy.

## 2022-10-08 NOTE — BH INPATIENT PSYCHIATRY ASSESSMENT NOTE - NSBHCRANIAL_PSY_ALL_CORE
Left message to call back.   Recognizes 2 fingers or can read (II)/Opens mouth, sticks out tongue (V, XII)/Normal speech (IX, X, XII)/EOMI (III, IV, VI)/Shoulder shrug (XI)/Hearing intact (VIII)

## 2022-10-08 NOTE — BH INPATIENT PSYCHIATRY ASSESSMENT NOTE - ATTENDING COMMENTS
Pt is 22 year old man with pphx of psychosis and 1 prior admission, now hospitalized with severe delusions, disorganization, and auditory hallucinations.  Interview severely limited as pt does not answer any questions.  Pt is found in hallway standing next to peer, mimicking hand gestures that peer makes, pt also tries to tug at pants of peer, then runs down hallway, picks up phone, then sets it back down.  Pt remains severely disorganized on unit.  Agree with plan and assessment above, would also need to closely monitor for agitated catatonia given selective mutism and bizarre echopraxia-like disorganization.  9.39 completed.

## 2022-10-09 PROCEDURE — 99232 SBSQ HOSP IP/OBS MODERATE 35: CPT

## 2022-10-09 RX ORDER — BACITRACIN ZINC 500 UNIT/G
1 OINTMENT IN PACKET (EA) TOPICAL
Refills: 0 | Status: DISCONTINUED | OUTPATIENT
Start: 2022-10-09 | End: 2022-10-19

## 2022-10-09 RX ADMIN — HALOPERIDOL DECANOATE 5 MILLIGRAM(S): 100 INJECTION INTRAMUSCULAR at 23:53

## 2022-10-09 RX ADMIN — Medication 50 MILLIGRAM(S): at 23:53

## 2022-10-09 RX ADMIN — Medication 5 MILLIGRAM(S): at 20:33

## 2022-10-09 RX ADMIN — Medication 50 MILLIGRAM(S): at 09:26

## 2022-10-09 RX ADMIN — ARIPIPRAZOLE 10 MILLIGRAM(S): 15 TABLET ORAL at 09:26

## 2022-10-09 RX ADMIN — Medication 2 MILLIGRAM(S): at 23:53

## 2022-10-09 NOTE — BH INPATIENT PSYCHIATRY PROGRESS NOTE - NSBHCHARTREVIEWVS_PSY_A_CORE FT
Vital Signs Last 24 Hrs  T(C): 36.6 (10-09-22 @ 06:32), Max: 36.6 (10-09-22 @ 06:32)  T(F): 97.8 (10-09-22 @ 06:32), Max: 97.8 (10-09-22 @ 06:32)  HR: --  BP: --  BP(mean): --  RR: --  SpO2: --    Orthostatic VS  10-09-22 @ 06:32  Lying BP: --/-- HR: --  Sitting BP: 129/81 HR: 103  Standing BP: --/-- HR: --  Site: --  Mode: --  Orthostatic VS  10-08-22 @ 19:22  Lying BP: --/-- HR: --  Sitting BP: 140/82 HR: 110  Standing BP: 138/86 HR: 112  Site: --  Mode: --  Orthostatic VS  10-08-22 @ 06:44  Lying BP: --/-- HR: --  Sitting BP: 132/90 HR: 101  Standing BP: 138/95 HR: 106  Site: --  Mode: --

## 2022-10-09 NOTE — BH INPATIENT PSYCHIATRY PROGRESS NOTE - NSBHATTESTBILLONSITE_PSY_A_CORE
well developed, well nourished , in no acute distress , ambulating without difficulty , normal communication ability LETY to bill

## 2022-10-09 NOTE — BH INPATIENT PSYCHIATRY PROGRESS NOTE - NSBHASSESSSUMMFT_PSY_ALL_CORE
22 year old single male.  Patient has 1 prior psych admission on the adolescent unit for psychosis.  One admission in 2014 at [facility] for bizarre behavior - had significant work up including LP, EEG. received outpatient tx with [facility] until pt tapered off medication in 2018.  Patient reports cannabis use.  Patient is hospitalized with a primary problem of psychotic decompensation.  Patient admitted to [facility] on a 9.39 legal status.    Plan:  >Legal: 9.39  >Obs: Routine, no current SI. no need for CO, patient not expected to pose risk to self or others in controlled inpatient setting  >Psychiatric Meds: Restart medication regimen: Abilify 10mg daily. Observe for tolerability and efficacy. Patient had been poorly adherent prior to admission.     PRN medications:  Ativan 2mg oral Q6HR PRN for agitation and anxiety.  Haldol 5mg oral Q6HR PRN for agitation.   Benadryl 50mg oral Q6HR PRN for agitation.   Vistaril 50mg oral Q6HR PRN for anxiety.  Desyrel 50mg oral QHS PRN for insomnia.   >Labs: Admission labs reviewed, no acute findings. utox +THC/ Labs pending for tomorrow: A1c and Lipid panel.  Hold antipsychotics if QTc >500  >Medical:   No acute concerns. No consultations needed at this time. No indication for CIWA. Patient with consistently stable VS, no visible physical symptoms of withdrawal.   During the course of treatment, will collaborate with medical team to manage medical issues.  >Diet: Regular  >Social: milieu/structured therapy  >Treatment Interventions: Groups and Individual Therapy/CBT, Motivational counseling for substance abuse related issues.   >Dispo: Collateral and dispo planning pending further symptom and medication optimization

## 2022-10-09 NOTE — BH INPATIENT PSYCHIATRY PROGRESS NOTE - OTHER
odd relatedness, bizarre selectively mute Appears internally disturbed unable to tolerate interview, selectively mute during interview

## 2022-10-10 PROCEDURE — 99232 SBSQ HOSP IP/OBS MODERATE 35: CPT

## 2022-10-10 RX ORDER — BENZTROPINE MESYLATE 1 MG
0.5 TABLET ORAL
Refills: 0 | Status: DISCONTINUED | OUTPATIENT
Start: 2022-10-10 | End: 2022-10-19

## 2022-10-10 RX ORDER — RISPERIDONE 4 MG/1
1 TABLET ORAL
Refills: 0 | Status: DISCONTINUED | OUTPATIENT
Start: 2022-10-10 | End: 2022-10-17

## 2022-10-10 RX ADMIN — Medication 1 MILLIGRAM(S): at 20:22

## 2022-10-10 RX ADMIN — ARIPIPRAZOLE 10 MILLIGRAM(S): 15 TABLET ORAL at 08:03

## 2022-10-10 RX ADMIN — Medication 2 MILLIGRAM(S): at 11:15

## 2022-10-10 RX ADMIN — Medication 0.5 MILLIGRAM(S): at 20:22

## 2022-10-10 RX ADMIN — Medication 5 MILLIGRAM(S): at 20:22

## 2022-10-10 RX ADMIN — RISPERIDONE 1 MILLIGRAM(S): 4 TABLET ORAL at 20:22

## 2022-10-10 NOTE — BH INPATIENT PSYCHIATRY PROGRESS NOTE - NSBHCHARTREVIEWVS_PSY_A_CORE FT
Vital Signs Last 24 Hrs  T(C): 36.4 (10-10-22 @ 08:57), Max: 36.4 (10-09-22 @ 19:24)  T(F): 97.6 (10-10-22 @ 08:57), Max: 97.6 (10-10-22 @ 08:57)      Orthostatic VS  10-10-22 @ 08:57  Lying BP: --/-- HR: --  Sitting BP: 136/83 HR: 110  Standing BP: 134/82 HR: 100  Site: --  Mode: --  Orthostatic VS  10-09-22 @ 19:24  Lying BP: --/-- HR: --  Sitting BP: 124/90 HR: 108  Standing BP: 136/78 HR: 110  Site: --  Mode: --  Orthostatic VS  10-09-22 @ 06:32  Lying BP: --/-- HR: --  Sitting BP: 129/81 HR: 103  Standing BP: --/-- HR: --  Site: --  Mode: --  Orthostatic VS  10-08-22 @ 19:22  Lying BP: --/-- HR: --  Sitting BP: 140/82 HR: 110  Standing BP: 138/86 HR: 112  Site: --  Mode: --

## 2022-10-10 NOTE — BH INPATIENT PSYCHIATRY PROGRESS NOTE - OTHER
odd relatedness, bizarre previously reported ah and vh, internally preoccupied at times. pleasant but tangential increased rate selectively mute blunted, giddy "good"

## 2022-10-10 NOTE — BH SOCIAL WORK INITIAL PSYCHOSOCIAL EVALUATION - OTHER PAST PSYCHIATRIC HISTORY (INCLUDE DETAILS REGARDING ONSET, COURSE OF ILLNESS, INPATIENT/OUTPATIENT TREATMENT)
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, no known prior suicide attempt or violence, regular cannabis use, no known withdrawal, no known legal issues, PMH hx of bilateral hearing loss at birth, recent medical admission at [place] for 'cannabis intoxication delirium' discharged 2 days ago, referred by Crisis Center, where family brought patient for evaluation due to persistent psychosis, to assess need for involuntary hospitalization Patient is a 22 year old male, domiciled at home with family, reportedly works in security, single, non-caregiver, with a history of psychosis and one prior hospitalization at age 14 on [facility] adolescent unit. Patient has a medical history of bilateral hearing loss since birth. Patient was recently admitted to medicine at [facility] for "cannabis induced delirium" from 9/30-10/5 2022. Patient admits to cannabis use, no other substance use known. No prior suicide attempts, violence or aggression, or known legal issues. Patient was sent to the emergency after recommendation by [facility] after being brought in by family for bizarre behaviors. Patient is not currently in outpatient treatment nor taking psychiatric medications.

## 2022-10-10 NOTE — BH INPATIENT PSYCHIATRY PROGRESS NOTE - NSBHASSESSSUMMFT_PSY_ALL_CORE
22 year old single male.  Patient has 1 prior psych admission on the adolescent unit for psychosis.  One admission in 2014 at [facility] for bizarre behavior - had significant work up including LP, EEG. received outpatient tx with [facility] until pt tapered off medication in 2018.  Patient reports cannabis use.  Patient is hospitalized with a primary problem of psychotic decompensation.  Given that he did well for a number of years since his hospitalizaiton, a primary psychotic disorder is unlikely.  However, he shows psychotic and catatonic symptoms and is arguably somewhat mood elevated, presenting us with a differential of manic episode with psychosis, subtance induced psychosis and also catatonia.  He did well previously on risperidone and ativan and we will mimic that previous regimen.  Family in agreement.      1. Continue 939 hospitalization.  Unable to care for self.  2. Routine checks appropriate--in behavioral control here.  3. haldol, ativan and benadryl prn  4. will dc abilify and give risperidone one mg twice daily, ativan one mg twice daily and cogentin 0.5mg twice daily because of hsitory of response in the past.    5. monitor tachycardia--consistently 100-110.

## 2022-10-11 PROCEDURE — 90853 GROUP PSYCHOTHERAPY: CPT

## 2022-10-11 PROCEDURE — 99232 SBSQ HOSP IP/OBS MODERATE 35: CPT

## 2022-10-11 RX ADMIN — Medication 5 MILLIGRAM(S): at 20:27

## 2022-10-11 RX ADMIN — Medication 1 MILLIGRAM(S): at 08:04

## 2022-10-11 RX ADMIN — RISPERIDONE 1 MILLIGRAM(S): 4 TABLET ORAL at 20:27

## 2022-10-11 RX ADMIN — Medication 0.5 MILLIGRAM(S): at 08:04

## 2022-10-11 RX ADMIN — RISPERIDONE 1 MILLIGRAM(S): 4 TABLET ORAL at 08:04

## 2022-10-11 RX ADMIN — Medication 1 MILLIGRAM(S): at 20:27

## 2022-10-11 RX ADMIN — Medication 0.5 MILLIGRAM(S): at 20:27

## 2022-10-11 NOTE — BH INPATIENT PSYCHIATRY PROGRESS NOTE - NSBHCHARTREVIEWVS_PSY_A_CORE FT
Vital Signs Last 24 Hrs  T(C): 36.4 (10-11-22 @ 08:09), Max: 36.5 (10-10-22 @ 19:05)  T(F): 97.5 (10-11-22 @ 08:09), Max: 97.7 (10-10-22 @ 19:05)    Orthostatic VS  10-11-22 @ 08:09  Lying BP: --/-- HR: --  Sitting BP: 117/68 HR: 107  Standing BP: --/-- HR: --  Site: --  Mode: --  Orthostatic VS  10-10-22 @ 19:05  Lying BP: --/-- HR: --  Sitting BP: 135/80 HR: 115  Standing BP: 124/88 HR: 118  Site: --  Mode: --  Orthostatic VS  10-10-22 @ 08:57  Lying BP: --/-- HR: --  Sitting BP: 136/83 HR: 110  Standing BP: 134/82 HR: 100  Site: --  Mode: --  Orthostatic VS  10-09-22 @ 19:24  Lying BP: --/-- HR: --  Sitting BP: 124/90 HR: 108  Standing BP: 136/78 HR: 110  Site: --  Mode: --

## 2022-10-11 NOTE — BH INPATIENT PSYCHIATRY PROGRESS NOTE - NSBHASSESSSUMMFT_PSY_ALL_CORE
22 year old single male.  Patient has 1 prior psych admission on the adolescent unit for psychosis.  One admission in 2014 at [facility] for bizarre behavior - had significant work up including LP, EEG. received outpatient tx with [facility] until pt tapered off medication in 2018.  Patient reports cannabis use.  Patient is hospitalized with a primary problem of psychotic decompensation.  Given that he did well for a number of years since his hospitalizaiton, a primary psychotic disorder is unlikely.  However, he shows psychotic and catatonic symptoms and is arguably somewhat mood elevated, presenting us with a differential of manic episode with psychosis, subtance induced psychosis and also catatonia.  He did well previously on risperidone and ativan and we will mimic that previous regimen.  Family in agreement.      1. Continue 939 hospitalization.  Unable to care for self.  2. Routine checks appropriate--in behavioral control here.  3. haldol, ativan and benadryl prn  4. will increase ativan to one mg three times daily.  continue risperidone one mg twice daily and cogentin 0.5mg twice daily because of hsitory of response in the past.    5. monitor tachycardia--consistently 100-110.

## 2022-10-11 NOTE — BH INPATIENT PSYCHIATRY PROGRESS NOTE - OTHER
blunted, giddy odd relatedness, bizarre selectively mute pleasant but limited by organization "good" increased rate previously reported ah and vh, internally preoccupied at times.

## 2022-10-12 PROCEDURE — 99232 SBSQ HOSP IP/OBS MODERATE 35: CPT

## 2022-10-12 RX ADMIN — RISPERIDONE 1 MILLIGRAM(S): 4 TABLET ORAL at 08:31

## 2022-10-12 RX ADMIN — RISPERIDONE 1 MILLIGRAM(S): 4 TABLET ORAL at 20:17

## 2022-10-12 RX ADMIN — Medication 1 MILLIGRAM(S): at 20:17

## 2022-10-12 RX ADMIN — Medication 1 MILLIGRAM(S): at 08:31

## 2022-10-12 RX ADMIN — Medication 0.5 MILLIGRAM(S): at 08:31

## 2022-10-12 RX ADMIN — Medication 0.5 MILLIGRAM(S): at 20:17

## 2022-10-12 RX ADMIN — Medication 5 MILLIGRAM(S): at 20:16

## 2022-10-12 RX ADMIN — Medication 1 MILLIGRAM(S): at 12:46

## 2022-10-12 NOTE — BH INPATIENT PSYCHIATRY PROGRESS NOTE - OTHER
selectively mute blunted, giddy pleasant but limited by organization "good" previously reported ah and vh, internally preoccupied at times. odd relatedness, bizarre increased rate. perseverative statements odd untrue thoughts not held with firm conviction

## 2022-10-12 NOTE — BH INPATIENT PSYCHIATRY PROGRESS NOTE - NSBHCHARTREVIEWVS_PSY_A_CORE FT
Vital Signs Last 24 Hrs  T(C): 36.6 (10-12-22 @ 06:44), Max: 36.6 (10-12-22 @ 06:44)  T(F): 97.8 (10-12-22 @ 06:44), Max: 97.8 (10-12-22 @ 06:44)      Orthostatic VS  10-12-22 @ 06:44  Lying BP: --/-- HR: --  Sitting BP: 125/88 HR: 92  Standing BP: --/-- HR: --  Site: upper left arm  Mode: electronic  Orthostatic VS  10-11-22 @ 19:11  Lying BP: --/-- HR: --  Sitting BP: 131/83 HR: 120  Standing BP: 132/87 HR: 120  Site: --  Mode: --  Orthostatic VS  10-11-22 @ 08:09  Lying BP: --/-- HR: --  Sitting BP: 117/68 HR: 107  Standing BP: --/-- HR: --  Site: --  Mode: --  Orthostatic VS  10-10-22 @ 19:05  Lying BP: --/-- HR: --  Sitting BP: 135/80 HR: 115  Standing BP: 124/88 HR: 118  Site: --  Mode: --

## 2022-10-12 NOTE — BH INPATIENT PSYCHIATRY PROGRESS NOTE - NSBHASSESSSUMMFT_PSY_ALL_CORE
22 year old single male.  Patient has 1 prior psych admission on the adolescent unit for psychosis.  One admission in 2014 at [facility] for bizarre behavior - had significant work up including LP, EEG. received outpatient tx with [facility] until pt tapered off medication in 2018.  Patient reports cannabis use.  Patient is hospitalized with a primary problem of psychotic decompensation.  Given that he did well for a number of years since his hospitalizaiton, a primary psychotic disorder is unlikely.  However, he shows psychotic and catatonic symptoms and is arguably somewhat mood elevated, presenting us with a differential of manic episode with psychosis, subtance induced psychosis and also catatonia.  He did well previously on risperidone and ativan and we will mimic that previous regimen.  Family in agreement.      1. Continue 939 hospitalization.  Unable to care for self.  2. Routine checks appropriate--in behavioral control here.  3. haldol, ativan and benadryl prn  4.  ativan one mg three times daily.  continue risperidone one mg twice daily and cogentin 0.5mg twice daily because of hsitory of response in the past.    5. monitor tachycardia--HR normalizing? now <100 today.  continue to monitor

## 2022-10-13 RX ADMIN — Medication 1.5 MILLIGRAM(S): at 12:02

## 2022-10-13 RX ADMIN — RISPERIDONE 1 MILLIGRAM(S): 4 TABLET ORAL at 20:04

## 2022-10-13 RX ADMIN — RISPERIDONE 1 MILLIGRAM(S): 4 TABLET ORAL at 08:17

## 2022-10-13 RX ADMIN — Medication 0.5 MILLIGRAM(S): at 20:04

## 2022-10-13 RX ADMIN — Medication 1 MILLIGRAM(S): at 08:16

## 2022-10-13 RX ADMIN — Medication 0.5 MILLIGRAM(S): at 08:17

## 2022-10-13 RX ADMIN — Medication 1.5 MILLIGRAM(S): at 20:04

## 2022-10-13 RX ADMIN — Medication 5 MILLIGRAM(S): at 20:05

## 2022-10-13 NOTE — BH INPATIENT PSYCHIATRY PROGRESS NOTE - NSBHCHARTREVIEWVS_PSY_A_CORE FT
Vital Signs Last 24 Hrs  T(C): 36.4 (10-13-22 @ 06:35), Max: 36.6 (10-12-22 @ 20:04)  T(F): 97.6 (10-13-22 @ 06:35), Max: 97.9 (10-12-22 @ 20:04)  HR: --  BP: --  BP(mean): --  RR: --  SpO2: --    Orthostatic VS  10-13-22 @ 06:35  Lying BP: --/-- HR: --  Sitting BP: 139/88 HR: 89  Standing BP: --/-- HR: --  Site: upper left arm  Mode: electronic  Orthostatic VS  10-12-22 @ 20:04  Lying BP: --/-- HR: --  Sitting BP: 121/78 HR: 89  Standing BP: 121/73 HR: 81  Site: --  Mode: --  Orthostatic VS  10-12-22 @ 06:44  Lying BP: --/-- HR: --  Sitting BP: 125/88 HR: 92  Standing BP: --/-- HR: --  Site: upper left arm  Mode: electronic  Orthostatic VS  10-11-22 @ 19:11  Lying BP: --/-- HR: --  Sitting BP: 131/83 HR: 120  Standing BP: 132/87 HR: 120  Site: --  Mode: --

## 2022-10-13 NOTE — BH INPATIENT PSYCHIATRY PROGRESS NOTE - OTHER
"good" previously reported ah and vh, internally preoccupied at times. still odd and bizarre at times but is improving. blunted, giddy odd untrue thoughts not held with firm conviction but some more coherent content about family and so forth improving limited but iumproving selectively mute pleasant but limited by organization; still somewhat intrusive limited but improving improving rate, production

## 2022-10-13 NOTE — BH INPATIENT PSYCHIATRY PROGRESS NOTE - NSBHASSESSSUMMFT_PSY_ALL_CORE
22 year old single male.  Patient has 1 prior psych admission on the adolescent unit for psychosis.  One admission in 2014 at [facility] for bizarre behavior - had significant work up including LP, EEG. received outpatient tx with [facility] until pt tapered off medication in 2018.  Patient reports cannabis use.  Patient is hospitalized with a primary problem of psychotic decompensation.  Given that he did well for a number of years since his hospitalizaiton, a primary psychotic disorder is unlikely.  However, he shows psychotic and catatonic symptoms and is arguably somewhat mood elevated, presenting us with a differential of manic episode with psychosis, substance induced psychosis and also catatonia.  He did well previously on risperidone and ativan and we started a regimen that resembled regimen form previous hospitalization.  He was given risperidone one mg twice daily and started on ativan one mg twice daily, titrated to one mg three times daily and today will increase to 1.5mg three times daily for catatonia.     1. Continue 939 hospitalization.  Unable to care for self.  2. Routine checks appropriate--in behavioral control here.  3. haldol, ativan and benadryl prn  4.  ativan 1.5mg mg three times daily.  continue risperidone one mg twice daily and cogentin 0.5mg twice daily because of hsitory of response in the past.    5. tachycardia resolved

## 2022-10-14 PROCEDURE — 99232 SBSQ HOSP IP/OBS MODERATE 35: CPT

## 2022-10-14 PROCEDURE — 90853 GROUP PSYCHOTHERAPY: CPT

## 2022-10-14 RX ADMIN — Medication 0.5 MILLIGRAM(S): at 20:04

## 2022-10-14 RX ADMIN — Medication 1.5 MILLIGRAM(S): at 12:38

## 2022-10-14 RX ADMIN — RISPERIDONE 1 MILLIGRAM(S): 4 TABLET ORAL at 20:04

## 2022-10-14 RX ADMIN — Medication 1.5 MILLIGRAM(S): at 20:05

## 2022-10-14 RX ADMIN — Medication 1.5 MILLIGRAM(S): at 08:06

## 2022-10-14 RX ADMIN — Medication 0.5 MILLIGRAM(S): at 08:06

## 2022-10-14 RX ADMIN — RISPERIDONE 1 MILLIGRAM(S): 4 TABLET ORAL at 08:05

## 2022-10-14 RX ADMIN — Medication 5 MILLIGRAM(S): at 20:04

## 2022-10-14 NOTE — BH INPATIENT PSYCHIATRY PROGRESS NOTE - NSBHASSESSSUMMFT_PSY_ALL_CORE
22 year old single male.  Patient has 1 prior psych admission on the adolescent unit for psychosis.  One admission in 2014 at [facility] for bizarre behavior - had significant work up including LP, EEG. received outpatient tx with [facility] until pt tapered off medication in 2018.  Patient reports cannabis use.  Patient is hospitalized with a primary problem of psychotic decompensation.  Given that he did well for a number of years since his hospitalizaiton, a primary psychotic disorder is unlikely.  However, he shows psychotic and catatonic symptoms and is arguably somewhat mood elevated, presenting us with a differential of manic episode with psychosis, substance induced psychosis and also catatonia.  He did well previously on risperidone and ativan and we started a regimen that resembled regimen form previous hospitalization.  He was given risperidone one mg twice daily and started on ativan one mg twice daily, and we are titrating ativan for catatonia    1. Continue 939 hospitalization.  Unable to care for self.  2. Routine checks appropriate--in behavioral control here.  3. haldol, ativan and benadryl prn  4.  ativan 1.5mg mg three times daily.  continue risperidone one mg twice daily and cogentin 0.5mg twice daily because of hsitory of response in the past.    5. tachycardia resolved.  Continue to monitor vs

## 2022-10-14 NOTE — BH INPATIENT PSYCHIATRY PROGRESS NOTE - OTHER
some what less giddy but still somewhat elevated but improving.   limited but improving moderating rate, fair production,  less intrusive noted to be internally preoccupied and talking to himself at times but improving. odd and giddy at times "good" no collin delusions or overvalued ideas.  previous content becoming more reality based.  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  For example, talking about work, he relates "[employer].  0700.  [employer] to [employer].  Drink lots of Red Bull."   selectively mute

## 2022-10-14 NOTE — BH INPATIENT PSYCHIATRY PROGRESS NOTE - NSBHCHARTREVIEWVS_PSY_A_CORE FT
Vital Signs Last 24 Hrs  T(C): 36.6 (10-14-22 @ 06:42), Max: 36.7 (10-13-22 @ 20:24)  T(F): 97.9 (10-14-22 @ 06:42), Max: 98.1 (10-13-22 @ 20:24)  HR: --  BP: --  BP(mean): --  RR: --  SpO2: --    Orthostatic VS  10-14-22 @ 06:42  Lying BP: --/-- HR: --  Sitting BP: 123/83 HR: 95  Standing BP: 125/80 HR: 104  Site: --  Mode: --  Orthostatic VS  10-13-22 @ 20:24  Lying BP: --/-- HR: --  Sitting BP: 133/80 HR: 111  Standing BP: 125/85 HR: 116  Site: upper left arm  Mode: electronic  Orthostatic VS  10-13-22 @ 06:35  Lying BP: --/-- HR: --  Sitting BP: 139/88 HR: 89  Standing BP: --/-- HR: --  Site: upper left arm  Mode: electronic  Orthostatic VS  10-12-22 @ 20:04  Lying BP: --/-- HR: --  Sitting BP: 121/78 HR: 89  Standing BP: 121/73 HR: 81  Site: --  Mode: --

## 2022-10-14 NOTE — BH PSYCHOLOGY - GROUP THERAPY NOTE - NSBHPSYCHOLPARTICIPCOMMENT_PSY_A_CORE FT
Patient arrived late to the group session. Patient contributed to group session when prompted. Patient appeared distracted and paced back and forth during the group session.
Patient arrived late to the group session. Patient contributed to group session when prompted and provided validation to a group member based on the group discussion. Patient initially appeared distracted and paced back and forth and sat down towards the end of session.

## 2022-10-14 NOTE — BH PSYCHOLOGY - GROUP THERAPY NOTE - NSPSYCHOLGRPGENPT_PSY_A_CORE FT
Patient attended the cognitive behavior therapy group session. Group session focused on the topic of communicating effectively.  Discussion revolved around the different types of communication styles as well as developing strategies to communicate more effectively.  Group expectations and guidelines, as well as confidentiality and its limitations, were addressed. Principles of cognitive behavior therapy related to today's group topic were reviewed and discussed. Group members illustrated understanding of these concepts by giving personal examples based on their current experiences. Discussions stemmed from the group topics to the causal connection between thoughts, feelings, and behaviors.
Patient attended the cognitive behavior therapy group session. Group session focused on the topic of assertive communication.  Discussion revolved around the important role of nonverbal behaviors in communication and its impact on communicating effectively. Group expectations and guidelines, as well as confidentiality and its limitations, were addressed. Principles of cognitive behavior therapy related to today's group topic were reviewed and discussed. Group members illustrated understanding of these concepts by giving personal examples based on their current experiences. Discussions stemmed from the group topics to the causal connection between thoughts, feelings, and behaviors.

## 2022-10-14 NOTE — BH PSYCHOLOGY - GROUP THERAPY NOTE - TOKEN PULL-DIAGNOSIS
Emergency Department Nursing Plan of Care       The Nursing Plan of Care is developed from the Nursing assessment and Emergency Department Attending provider initial evaluation. The plan of care may be reviewed in the ED Provider note.     The Plan of Care was developed with the following considerations:   Patient / Family readiness to learn indicated by:verbalized understanding  Persons(s) to be included in education: patient  Barriers to Learning/Limitations:No    Signed     Valentin Emerson    9/17/2019   6:34 PM
Primary Diagnosis:  Psychotic disorder [F29]        Problem Dx:   Cannabis abuse [F12.10]      Psychotic disorder [F29]      
Primary Diagnosis:  Psychotic disorder [F29]        Problem Dx:   Cannabis abuse [F12.10]      Psychotic disorder [F29]

## 2022-10-15 RX ADMIN — RISPERIDONE 1 MILLIGRAM(S): 4 TABLET ORAL at 08:10

## 2022-10-15 RX ADMIN — RISPERIDONE 1 MILLIGRAM(S): 4 TABLET ORAL at 20:00

## 2022-10-15 RX ADMIN — Medication 0.5 MILLIGRAM(S): at 20:00

## 2022-10-15 RX ADMIN — Medication 5 MILLIGRAM(S): at 20:00

## 2022-10-15 RX ADMIN — Medication 1.5 MILLIGRAM(S): at 20:00

## 2022-10-15 RX ADMIN — Medication 0.5 MILLIGRAM(S): at 08:10

## 2022-10-15 RX ADMIN — Medication 1.5 MILLIGRAM(S): at 08:10

## 2022-10-15 RX ADMIN — Medication 1.5 MILLIGRAM(S): at 12:05

## 2022-10-16 RX ADMIN — Medication 1.5 MILLIGRAM(S): at 13:08

## 2022-10-16 RX ADMIN — RISPERIDONE 1 MILLIGRAM(S): 4 TABLET ORAL at 08:40

## 2022-10-16 RX ADMIN — Medication 5 MILLIGRAM(S): at 20:41

## 2022-10-16 RX ADMIN — Medication 1.5 MILLIGRAM(S): at 20:40

## 2022-10-16 RX ADMIN — Medication 0.5 MILLIGRAM(S): at 08:40

## 2022-10-16 RX ADMIN — Medication 0.5 MILLIGRAM(S): at 20:40

## 2022-10-16 RX ADMIN — Medication 1.5 MILLIGRAM(S): at 08:40

## 2022-10-16 RX ADMIN — RISPERIDONE 1 MILLIGRAM(S): 4 TABLET ORAL at 20:41

## 2022-10-17 RX ORDER — RISPERIDONE 4 MG/1
1 TABLET ORAL DAILY
Refills: 0 | Status: DISCONTINUED | OUTPATIENT
Start: 2022-10-17 | End: 2022-10-19

## 2022-10-17 RX ORDER — RISPERIDONE 4 MG/1
2 TABLET ORAL AT BEDTIME
Refills: 0 | Status: DISCONTINUED | OUTPATIENT
Start: 2022-10-17 | End: 2022-10-19

## 2022-10-17 RX ORDER — RISPERIDONE 4 MG/1
1 TABLET ORAL AT BEDTIME
Refills: 0 | Status: DISCONTINUED | OUTPATIENT
Start: 2022-10-17 | End: 2022-10-17

## 2022-10-17 RX ADMIN — Medication 1.5 MILLIGRAM(S): at 20:14

## 2022-10-17 RX ADMIN — Medication 1.5 MILLIGRAM(S): at 08:11

## 2022-10-17 RX ADMIN — Medication 0.5 MILLIGRAM(S): at 20:13

## 2022-10-17 RX ADMIN — Medication 0.5 MILLIGRAM(S): at 08:11

## 2022-10-17 RX ADMIN — Medication 1.5 MILLIGRAM(S): at 14:10

## 2022-10-17 RX ADMIN — Medication 5 MILLIGRAM(S): at 20:14

## 2022-10-17 RX ADMIN — RISPERIDONE 1 MILLIGRAM(S): 4 TABLET ORAL at 08:11

## 2022-10-17 RX ADMIN — RISPERIDONE 2 MILLIGRAM(S): 4 TABLET ORAL at 20:14

## 2022-10-17 NOTE — BH INPATIENT PSYCHIATRY PROGRESS NOTE - NSBHASSESSSUMMFT_PSY_ALL_CORE
22 year old single male.  Patient has 1 prior psych admission on the adolescent unit for psychosis.  One admission in 2014 at [facility] for bizarre behavior - had significant work up including LP, EEG. received outpatient tx with [facility] until pt tapered off medication in 2018.  Patient reports cannabis use.  Patient is hospitalized with a primary problem of psychotic decompensation.  Given that he did well for a number of years since his hospitalizaiton, a primary psychotic disorder is unlikely.  However, he shows psychotic and catatonic symptoms and is arguably somewhat mood elevated, presenting us with a differential of manic episode with psychosis, substance induced psychosis and also catatonia.  He did well previously on risperidone and ativan and we started a regimen that resembled regimen form previous hospitalization.  He was given risperidone one mg twice daily and started on ativan one mg twice daily,titrated to 1.5mg three times daily with good improvement.  He was better related but still with some thought disorder so we are increasing ativan risperidone from one mg twice daily to one mg q am and two mg at bedtime.     1. Continue 939 hospitalization.  Unable to care for self.  2. Routine checks appropriate--in behavioral control here.  3. haldol, ativan and benadryl prn  4.  increase risperidone to one mg q am and two mg at bedtime;  ativan 1.5mg mg three times daily; cogentin 0.5mg twice daily because of hsitory of response in the past.    5. tachycardia resolved.  Continue to monitor vs

## 2022-10-17 NOTE — BH INPATIENT PSYCHIATRY PROGRESS NOTE - OTHER
more neutral to euthymic although somewhat irritable about waiting for discharge starting to become preoccupied with discharge limited but improving "good" less perseverative but somewhat loose, circumstatnial, continues with bullet point thinking, discharge preoccupation more prominent speech impediment (present at baseline) continues to improve.  he is preoccupied with dishcarge.  good prososdy.

## 2022-10-17 NOTE — BH INPATIENT PSYCHIATRY PROGRESS NOTE - NSBHCHARTREVIEWVS_PSY_A_CORE FT
Vital Signs Last 24 Hrs  T(C): 36.3 (10-17-22 @ 06:25), Max: 36.6 (10-16-22 @ 20:04)  T(F): 97.4 (10-17-22 @ 06:25), Max: 97.8 (10-16-22 @ 20:04)    Orthostatic VS  10-17-22 @ 06:25  Lying BP: --/-- HR: --  Sitting BP: 120/79 HR: 95  Standing BP: 124/88 HR: 102  Site: --  Mode: --  Orthostatic VS  10-16-22 @ 20:04  Lying BP: --/-- HR: --  Sitting BP: 146/82 HR: 118  Standing BP: 94/119 HR: --  Site: --  Mode: --  Orthostatic VS  10-16-22 @ 08:19  Lying BP: --/-- HR: --  Sitting BP: 113/75 HR: 95  Standing BP: 123/81 HR: 102  Site: --  Mode: --  Orthostatic VS  10-15-22 @ 20:15  Lying BP: --/-- HR: --  Sitting BP: 120/85 HR: 106  Standing BP: 128/82 HR: 108  Site: --  Mode: --

## 2022-10-18 ENCOUNTER — OUTPATIENT (OUTPATIENT)
Dept: OUTPATIENT SERVICES | Facility: HOSPITAL | Age: 22
LOS: 1 days | Discharge: HOME | End: 2022-10-18

## 2022-10-18 PROCEDURE — 99232 SBSQ HOSP IP/OBS MODERATE 35: CPT

## 2022-10-18 RX ORDER — BENZTROPINE MESYLATE 1 MG
1 TABLET ORAL
Qty: 60 | Refills: 0
Start: 2022-10-18 | End: 2022-11-16

## 2022-10-18 RX ORDER — RISPERIDONE 4 MG/1
1 TABLET ORAL
Qty: 30 | Refills: 0
Start: 2022-10-18 | End: 2022-11-16

## 2022-10-18 RX ADMIN — Medication 5 MILLIGRAM(S): at 20:28

## 2022-10-18 RX ADMIN — Medication 0.5 MILLIGRAM(S): at 08:04

## 2022-10-18 RX ADMIN — RISPERIDONE 1 MILLIGRAM(S): 4 TABLET ORAL at 08:05

## 2022-10-18 RX ADMIN — Medication 0.5 MILLIGRAM(S): at 20:27

## 2022-10-18 RX ADMIN — Medication 1.5 MILLIGRAM(S): at 08:04

## 2022-10-18 RX ADMIN — Medication 1 MILLIGRAM(S): at 20:28

## 2022-10-18 RX ADMIN — RISPERIDONE 2 MILLIGRAM(S): 4 TABLET ORAL at 20:28

## 2022-10-18 RX ADMIN — Medication 1 MILLIGRAM(S): at 12:58

## 2022-10-18 NOTE — BH INPATIENT PSYCHIATRY PROGRESS NOTE - NSBHASSESSSUMMFT_PSY_ALL_CORE
22 year old single male.  Patient has 1 prior psych admission on the adolescent unit for psychosis.  One admission in 2014 at [facility] for bizarre behavior - had significant work up including LP, EEG. received outpatient tx with [facility] until pt tapered off medication in 2018.  Patient reports cannabis use.  Patient is hospitalized with a primary problem of psychotic decompensation.  Given that he did well for a number of years since his hospitalizaiton, a primary psychotic disorder is unlikely.  However, he shows psychotic and catatonic symptoms and is arguably somewhat mood elevated, presenting us with a differential of manic episode with psychosis, substance induced psychosis and also catatonia.  He did well previously on risperidone and ativan and we started a regimen that resembled regimen form previous hospitalization.  He was given risperidone one mg twice daily and started on ativan one mg twice daily,titrated to 1.5mg three times daily with good improvement.  He was better related but still with some thought disorder so we are increasing ativan risperidone from one mg twice daily to one mg q am and two mg at bedtime and given that he is much less catatonic we will reduce ativan to one mg three times daily.    1. Continue 939 hospitalization.  Unable to care for self.  2. Routine checks appropriate--in behavioral control here.  3. haldol, ativan and benadryl prn  4.  risperidone one mg q am and two mg at bedtime;  reduce ativan to  one mg three times daily; cogentin 0.5mg twice daily because of hsitory of response in the past.    5. tachycardia resolved.  Continue to monitor vs

## 2022-10-18 NOTE — BH INPATIENT PSYCHIATRY PROGRESS NOTE - NSBHCHARTREVIEWVS_PSY_A_CORE FT
Vital Signs Last 24 Hrs  T(C): 36.5 (10-18-22 @ 06:32), Max: 36.9 (10-17-22 @ 19:21)  T(F): 97.7 (10-18-22 @ 06:32), Max: 98.4 (10-17-22 @ 19:21)      Orthostatic VS  10-18-22 @ 06:32  Lying BP: --/-- HR: --  Sitting BP: 121/78 HR: 91  Standing BP: 120/86 HR: 96  Site: --  Mode: --  Orthostatic VS  10-17-22 @ 19:21  Lying BP: --/-- HR: --  Sitting BP: 113/73 HR: 103  Standing BP: 118/74 HR: 110  Site: --  Mode: --  Orthostatic VS  10-17-22 @ 06:25  Lying BP: --/-- HR: --  Sitting BP: 120/79 HR: 95  Standing BP: 124/88 HR: 102  Site: --  Mode: --  Orthostatic VS  10-16-22 @ 20:04  Lying BP: --/-- HR: --  Sitting BP: 146/82 HR: 118  Standing BP: 94/119 HR: --  Site: --  Mode: --   Vital Signs Last 24 Hrs  T(C): 36.5 (10-18-22 @ 06:32), Max: 36.9 (10-17-22 @ 19:21)  T(F): 97.7 (10-18-22 @ 06:32), Max: 98.4 (10-17-22 @ 19:21)  HR: --  BP: --  BP(mean): --  RR: --  SpO2: --    Orthostatic VS  10-18-22 @ 06:32  Lying BP: --/-- HR: --  Sitting BP: 121/78 HR: 91  Standing BP: 120/86 HR: 96  Site: --  Mode: --  Orthostatic VS  10-17-22 @ 19:21  Lying BP: --/-- HR: --  Sitting BP: 113/73 HR: 103  Standing BP: 118/74 HR: 110  Site: --  Mode: --  Orthostatic VS  10-17-22 @ 06:25  Lying BP: --/-- HR: --  Sitting BP: 120/79 HR: 95  Standing BP: 124/88 HR: 102  Site: --  Mode: --  Orthostatic VS  10-16-22 @ 20:04  Lying BP: --/-- HR: --  Sitting BP: 146/82 HR: 118  Standing BP: 94/119 HR: --  Site: --  Mode: --

## 2022-10-18 NOTE — BH INPATIENT PSYCHIATRY PROGRESS NOTE - OTHER
somewhat preoccupied with discharge but less intrusive "good" limited but improving no collin delusions or overvalued ideas.  previous content becoming more reality based.  circumstantial, continues with bullet point thinking, discharge preoccupation more prominent neutral to euthymic although somewhat irritable about waiting for discharge speech impediment (present at baseline) continues to improve. fair rate, voice and production.

## 2022-10-19 VITALS — TEMPERATURE: 97 F

## 2022-10-19 PROCEDURE — 99239 HOSP IP/OBS DSCHRG MGMT >30: CPT

## 2022-10-19 RX ORDER — MAGNESIUM HYDROXIDE 400 MG/1
30 TABLET, CHEWABLE ORAL ONCE
Refills: 0 | Status: COMPLETED | OUTPATIENT
Start: 2022-10-19 | End: 2022-10-19

## 2022-10-19 RX ADMIN — MAGNESIUM HYDROXIDE 30 MILLILITER(S): 400 TABLET, CHEWABLE ORAL at 05:17

## 2022-10-19 RX ADMIN — Medication 1 MILLIGRAM(S): at 13:04

## 2022-10-19 RX ADMIN — RISPERIDONE 1 MILLIGRAM(S): 4 TABLET ORAL at 08:37

## 2022-10-19 RX ADMIN — Medication 0.5 MILLIGRAM(S): at 08:40

## 2022-10-19 RX ADMIN — Medication 1 MILLIGRAM(S): at 08:35

## 2022-10-19 NOTE — BH INPATIENT PSYCHIATRY PROGRESS NOTE - NSBHMSEAFFRANGE_PSY_A_CORE
Unable to assess
Full/Blunted
Full
Full/Blunted
Full/Blunted
Unable to assess
Full/Blunted
Unable to assess
Unable to assess

## 2022-10-19 NOTE — BH INPATIENT PSYCHIATRY PROGRESS NOTE - NSTXCOPEDATEEST_PSY_ALL_CORE
07-Oct-2022
15-Oct-2022
15-Oct-2022
07-Oct-2022
15-Oct-2022

## 2022-10-19 NOTE — BH INPATIENT PSYCHIATRY PROGRESS NOTE - NSCGIIMPROVESX_PSY_ALL_CORE
2 = Much improved - notably better with signficant reduction of symptoms; increase in the level of functioning but some symptoms remain
3 = Minimally improved - slightly better with little or no clinically meaningful reduction of symptoms.  Represents very little change in basic clinical status, level of care, or functional capacity.
2 = Much improved - notably better with signficant reduction of symptoms; increase in the level of functioning but some symptoms remain
3 = Minimally improved - slightly better with little or no clinically meaningful reduction of symptoms.  Represents very little change in basic clinical status, level of care, or functional capacity.
2 = Much improved - notably better with signficant reduction of symptoms; increase in the level of functioning but some symptoms remain
3 = Minimally improved - slightly better with little or no clinically meaningful reduction of symptoms.  Represents very little change in basic clinical status, level of care, or functional capacity.

## 2022-10-19 NOTE — BH INPATIENT PSYCHIATRY PROGRESS NOTE - NSBHPSYCHOLCOGABN_PSY_A_CORE
disoriented to situation

## 2022-10-19 NOTE — BH DISCHARGE NOTE NURSING/SOCIAL WORK/PSYCH REHAB - PATIENT PORTAL LINK FT
You can access the FollowMyHealth Patient Portal offered by [facility] by registering at the following website: [email or web address]. By joining [facility]’s FollowMyHealth portal, you will also be able to view your health information using other applications (apps) compatible with our system.

## 2022-10-19 NOTE — BH INPATIENT PSYCHIATRY PROGRESS NOTE - NSBHMSETHTPROC_PSY_A_CORE
Other
Disorganized/Tangential/Perseverative/Thought blocking/Impaired reasoning
Linear/Other
Other
Disorganized/Impaired reasoning
Disorganized/Tangential/Perseverative/Thought blocking/Impaired reasoning
no
Disorganized/Tangential/Perseverative/Thought blocking/Impaired reasoning
Disorganized/Tangential/Perseverative/Impaired reasoning
Perseverative/Other

## 2022-10-19 NOTE — BH INPATIENT PSYCHIATRY DISCHARGE NOTE - NSDCMRMEDTOKEN_GEN_ALL_CORE_FT
Ativan 1 mg oral tablet: 1 tab(s) orally 3 times a day (dispensed #42)  benztropine 0.5 mg oral tablet: 1 tab(s) orally 2 times a day  risperiDONE 1 mg oral tablet: 1 tab(s) orally once a day  risperiDONE 2 mg oral tablet: 1 tab(s) orally once a day (at bedtime)

## 2022-10-19 NOTE — BH INPATIENT PSYCHIATRY PROGRESS NOTE - NSBHATTESTBILLINGAW_PSY_A_CORE
[id number] Inpatient care - moderate complexity - 25 minutes [id number] Inpatient care - high complexity - 35 minutes

## 2022-10-19 NOTE — BH INPATIENT PSYCHIATRY PROGRESS NOTE - NSICDXBHSECONDARYDX_PSY_ALL_CORE
Cannabis abuse   F12.10  

## 2022-10-19 NOTE — BH INPATIENT PSYCHIATRY DISCHARGE NOTE - NSBHDCHANDOFFFT_PSY_ALL_CORE
Communicated with Dr. Fernandez ([phone]) to solicit questions, discuss recommendations and medication regimen

## 2022-10-19 NOTE — BH INPATIENT PSYCHIATRY PROGRESS NOTE - NSTXDISORGDATETRGT_PSY_ALL_CORE
15-Oct-2022
22-Oct-2022
22-Oct-2022
15-Oct-2022
22-Oct-2022

## 2022-10-19 NOTE — BH INPATIENT PSYCHIATRY PROGRESS NOTE - NSBHASSESSSUMMFT_PSY_ALL_CORE
22 year old single male.  Patient has 1 prior psych admission on the adolescent unit for psychosis.  One admission in 2014 at [facility] for bizarre behavior - had significant work up including LP, EEG. received outpatient tx with [facility] until pt tapered off medication in 2018.  Patient reports cannabis use.  Patient is hospitalized with a primary problem of psychotic decompensation.  Given that he did well for a number of years since his hospitalizaiton, a primary psychotic disorder is unlikely.  However, he shows psychotic and catatonic symptoms and is arguably somewhat mood elevated, presenting us with a differential of manic episode with psychosis, substance induced psychosis and also catatonia.  He did well previously on risperidone and ativan and we started a regimen that resembled regimen form previous hospitalization.  He was given risperidone one mg twice daily and started on ativan one mg twice daily,titrated to 1.5mg three times daily with good improvement.  He was better related but still with some thought disorder so we are increasing ativan risperidone from one mg twice daily to one mg q am and two mg at bedtime and given that he is much less catatonic we will reduce ativan to one mg three times daily.    1. Continue 939 hospitalization.  Unable to care for self.  2. Routine checks appropriate--in behavioral control here.  3. haldol, ativan and benadryl prn  4.  risperidone one mg q am and two mg at bedtime;  reduce ativan to  one mg three times daily; cogentin 0.5mg twice daily because of hsitory of response in the past.    5. tachycardia resolved.  Continue to monitor vs 22 year old single male.  Patient has 1 prior psych admission on the adolescent unit for psychosis.  One admission in 2014 at [facility] for bizarre behavior - had significant work up including LP, EEG. received outpatient tx with [facility] until pt tapered off medication in 2018.  Patient reports cannabis use.  Patient is hospitalized with a primary problem of psychotic decompensation.  Given that he did well for a number of years since his hospitalizaiton, a primary psychotic disorder is unlikely.  However, he shows psychotic and catatonic symptoms and is arguably somewhat mood elevated, presenting us with a differential of manic episode with psychosis, substance induced psychosis and also catatonia.  He did well previously on risperidone and ativan and we started a regimen that resembled regimen form previous hospitalization.  He was given risperidone one mg twice daily and started on ativan one mg twice daily,titrated to 1.5mg three times daily with good improvement.  He was better related but still with some thought disorder so we are increasing ativan risperidone from one mg twice daily to one mg q am and two mg at bedtime and given that he is much less catatonic we reduced ativan to one mg three times daily.    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 .  discharged with 14 day supply of ativan one mg three times daily, risperidone one mg q am and two mg at bedtime and cogentin 0.5mg twice daily

## 2022-10-19 NOTE — BH DISCHARGE NOTE NURSING/SOCIAL WORK/PSYCH REHAB - NSDCPRRECOMMEND_PSY_ALL_CORE
Psychiatric Rehabilitation staff recommends that the patient engage in outpatient services for continued medication and symptom management. Upon discharge, patient has an appointment scheduled with [facility]: Addiction recovery services - in person

## 2022-10-19 NOTE — BH INPATIENT PSYCHIATRY PROGRESS NOTE - OTHER
limited but improving grossly linear and goal directed although there are instances in which  no collin delusions or overvalued ideas.  previous content becoming more reality based.  "good" speech impediment (present at baseline) continues to improve. fair rate, voice and production. neutral to euthymic no irritability grossly linear and goal directed although there are instances in which there was some thought blocking or a sudden switch in topic.

## 2022-10-19 NOTE — BH INPATIENT PSYCHIATRY DISCHARGE NOTE - OTHER PAST PSYCHIATRIC HISTORY (INCLUDE DETAILS REGARDING ONSET, COURSE OF ILLNESS, INPATIENT/OUTPATIENT TREATMENT)
Patient is a 22 year old male, domiciled at home with family, reportedly works in security, single, non-caregiver, with a history of psychosis and one prior hospitalization at age 14 on [facility] adolescent unit. Patient has a medical history of bilateral hearing loss since birth. Patient was recently admitted to medicine at [facility] for "cannabis induced delirium" from 9/30-10/5 2022. Patient admits to cannabis use, no other substance use known. No prior suicide attempts, violence or aggression, or known legal issues. Patient was sent to the emergency after recommendation by [facility] after being brought in by family for bizarre behaviors. Patient is not currently in outpatient treatment nor taking psychiatric medications.

## 2022-10-19 NOTE — BH INPATIENT PSYCHIATRY PROGRESS NOTE - NSTXDISORGDATEEST_PSY_ALL_CORE
08-Oct-2022
15-Oct-2022
08-Oct-2022
08-Oct-2022
15-Oct-2022
08-Oct-2022
08-Oct-2022
15-Oct-2022
08-Oct-2022

## 2022-10-19 NOTE — BH INPATIENT PSYCHIATRY PROGRESS NOTE - NSTXPSYCHOGOAL_PSY_ALL_CORE
Will be able to report experiencing hallucinations to staff
Will identify 2 coping skills that assist with focus on reality
Will be able to report experiencing hallucinations to staff

## 2022-10-19 NOTE — BH INPATIENT PSYCHIATRY PROGRESS NOTE - NSBHMETABOLIC_PSY_ALL_CORE_FT
BMI: BMI (kg/m2): 32.9 (10-07-22 @ 15:55)  HbA1c:   Glucose:   BP: --  Lipid Panel: 
BMI: BMI (kg/m2): 32.9 (10-07-22 @ 15:55)  HbA1c:   Glucose:   BP: --  Lipid Panel: 
BMI: BMI (kg/m2): 32.9 (10-07-22 @ 15:55)  HbA1c:   Glucose:   BP: 139/90 (10-07-22 @ 19:30) (139/90 - 145/89)  Lipid Panel: 
BMI: BMI (kg/m2): 32.9 (10-07-22 @ 15:55)  HbA1c:   Glucose:   BP: 139/90 (10-07-22 @ 19:30) (139/90 - 139/90)  Lipid Panel: 
BMI: BMI (kg/m2): 32.9 (10-07-22 @ 15:55)  HbA1c:   Glucose:   BP: --  Lipid Panel: 

## 2022-10-19 NOTE — BH INPATIENT PSYCHIATRY PROGRESS NOTE - NSTXPSYCHODATETRGT_PSY_ALL_CORE
14-Oct-2022
14-Oct-2022
22-Oct-2022
14-Oct-2022
22-Oct-2022
14-Oct-2022
22-Oct-2022

## 2022-10-19 NOTE — BH DISCHARGE NOTE NURSING/SOCIAL WORK/PSYCH REHAB - DISCHARGE INSTRUCTIONS AFTERCARE APPOINTMENTS
In order to check the location, date, or time of your aftercare appointment, please refer to your Discharge Instructions Document given to you upon leaving the hospital.  If you have lost the instructions please call [phone]

## 2022-10-19 NOTE — BH INPATIENT PSYCHIATRY PROGRESS NOTE - NSICDXBHPRIMARYDX_PSY_ALL_CORE
Psychotic disorder   F29  

## 2022-10-19 NOTE — BH INPATIENT PSYCHIATRY PROGRESS NOTE - NSBHFUPINTERVALHXFT_PSY_A_CORE
Staff report no interval events, compliant with medications, no prns.  Mother visited again.  He slept but was awake at 0515 (he usually has to be at work by 0700 so this sounds fairly normal).  He relates feeling better ("100%") but relates that he was much lower than that at admission.  He is preoccupied with discharge but redirectable.  
Staff report no interval events, compliant with medications, no prns. Slept until approximately 0500. He continues to tolerate medications and show good improvement.  Although night staff report he can be disorganized and guarded he is coherent and linear and goal directed with me.  He continues to show improvement in relatedness, affect, thought process and there is no grossly delusional content elicited. He asks appropriate questions about his medications and discharge plan.  
Staff report no interval events, compliant with medications, no prns. Mother visited yesterday, felt he was somewhat improved.  Able to have conversation with patient about recent family events including death of godfather ("my father figure") and a cousin ("with my other cousin we were like the three musketeers") before becoming somwhat disorganized, thought blocking, derailment.  
Staff report no interval events, somewhat intrusive, compliant with medications, no prns. They  note that he makes repetitive statements family notes that would do similarly in led up to hopspitalization.  His statements about beign a doctor, being , etc are not held with conviction and although they are not accurate descriptions of reality they do no seem to be delusions either because they are not fixed in nature and are more likely nonsense.  We consider possibility that perseveration, nonsensival statements may be element of catatonia.  today is day #2 ativan one mg three times daily.  He is perhaps somewhat more linear.
Staff report no itnerval events, complaint with medications, slept after 0100.  10/9 2353 b50 hr a2 po prn.  Staff report he would stand near the nursing station [facility] for sometime.  A little tachycardic.  Patient wanders about the unit, talking, repeating statements/echolalia and echopraxia at times.  He is somewhat giddy.  He reports he is a doctor, that he is , but can be disabused of these notions.  He talks in a mufffled dysarthric voice consistent with history of hearing loss.  Mother and father report patient did well for a number of years after his hospitalization here.  I note at timehe was discharged n risperdione one mg q am and 2 at bedtime, ativan 0.5mg tiwc edialy and cogentin 0.5mg twice daily.  During that child/adolescent hospitalization, was transferred to [facility] for work up, returned much improved on risperidone, ativan. Family relates presentation similar to adolescent/child presentation.  I gave him two mg ativan po late in the am and he seemd somewhat more organized and with less echo phenomonena.
Staff report no interval events, compliant with medications, no prns. Slept until approximately 0500.  He is doing better, tolerating medicaiton with good progress.  We adam be decreasing ativan to one mg three times daily as his catatonia appears to be much improved.  
Staff report no interval events, somewhat intrusive, compliant with medications, no prns.  He more or less slept although awake at times throughout the night.  Staff report very poor sleep over the weekend.  He is pleasant but preoccupied with discharge.  He continues to talk about how he is a doctor, he has a different family and so on.  He seems somewhat more linear at times but frequently will break from a thought and start laughing and seems to explain that he made some sort of sudden realization that he has been in a room before or something similar.  again mildly tachycardic
Staff report no interval events, compliant with medications, no prns. He slept until 0215 then was awake for an hour, slept for an hour and woke for the day at 0415.  He is more articulate and his speech impediment is clearer.  His thought proces sis still somewhat loose, circumstantial and evidence bullet point thinking.  As he becomes more clear, his preoccupation with discharge becomes more prominent.  Relates and episode of being in a music studio during a fire alarm recently which was apparently fairly stressful for him. 
Patient was seen and evaluated, chart, medications and labs reviewed. Case discussed with nursing team.  On service for this 22 year old single male.  Patient has 1 prior psych admission on the adolescent unit for psychosis.  One admission in 2014 at [facility] for bizarre behavior - had significant work up including LP, EEG. received outpatient tx with [facility] until pt tapered off medication in 2018.  Patient reports cannabis use.  Patient is hospitalized with a primary problem of psychotic decompensation.  Patient admitted to [facility] on a 9.39 legal status. I have reviewed the initial psychiatric assessment in the electronic medical record, including the history of present illness, past psychiatric history, family/social history (no pertinent changes), and exam, and have confirmed the salient findings dated   10/7/22.  Pt is 22 year old man with pphx of psychosis and 1 prior admission, now hospitalized with severe delusions, disorganization, and auditory hallucinations.  Interview severely limited as pt does not answer any questions.  Pt is found in dayroom standing next to peer, mimicking hand gestures that peer makes.  Pt remains severely disorganized on unit.    Patient is discussed with nursing staff. Patient took medications last evening. No behavioral concerns, no prns for aggression. Eating and sleeping well. Nursing reports pt has blister on his left foot (will order bacitracin) pt is encouraged to stay off foot..

## 2022-10-19 NOTE — BH INPATIENT PSYCHIATRY PROGRESS NOTE - CURRENT MEDICATION
MEDICATIONS  (STANDING):  benztropine 0.5 milliGRAM(s) Oral two times a day  LORazepam     Tablet 1 milliGRAM(s) Oral two times a day  melatonin 5 milliGRAM(s) Oral at bedtime  risperiDONE   Tablet 1 milliGRAM(s) Oral two times a day    MEDICATIONS  (PRN):  BACItracin   Ointment 1 Application(s) Topical two times a day PRN wound  diphenhydrAMINE 50 milliGRAM(s) Oral every 6 hours PRN Extrapyramidal prophylaxis  diphenhydrAMINE Injectable 50 milliGRAM(s) IntraMuscular once PRN Extrapyramidal prophylaxis  haloperidol     Tablet 5 milliGRAM(s) Oral every 6 hours PRN agitation  haloperidol    Injectable 5 milliGRAM(s) IntraMuscular Once PRN agitation  LORazepam     Tablet 2 milliGRAM(s) Oral every 6 hours PRN Anxiety  LORazepam   Injectable 2 milliGRAM(s) IntraMuscular once PRN Agitation  nicotine  Polacrilex Gum 2 milliGRAM(s) Oral every 2 hours PRN breakthrough cravings  
MEDICATIONS  (STANDING):  ARIPiprazole 10 milliGRAM(s) Oral daily  melatonin 5 milliGRAM(s) Oral at bedtime    MEDICATIONS  (PRN):  diphenhydrAMINE 50 milliGRAM(s) Oral every 6 hours PRN Extrapyramidal prophylaxis  diphenhydrAMINE Injectable 50 milliGRAM(s) IntraMuscular once PRN Extrapyramidal prophylaxis  haloperidol     Tablet 5 milliGRAM(s) Oral every 6 hours PRN agitation  haloperidol    Injectable 5 milliGRAM(s) IntraMuscular Once PRN agitation  LORazepam     Tablet 2 milliGRAM(s) Oral every 6 hours PRN Anxiety  LORazepam   Injectable 2 milliGRAM(s) IntraMuscular once PRN Agitation  nicotine  Polacrilex Gum 2 milliGRAM(s) Oral every 2 hours PRN breakthrough cravings  
MEDICATIONS  (STANDING):  benztropine 0.5 milliGRAM(s) Oral two times a day  LORazepam     Tablet 1.5 milliGRAM(s) Oral three times a day  melatonin 5 milliGRAM(s) Oral at bedtime  risperiDONE   Tablet 1 milliGRAM(s) Oral at bedtime  risperiDONE   Tablet 2 milliGRAM(s) Oral at bedtime    MEDICATIONS  (PRN):  BACItracin   Ointment 1 Application(s) Topical two times a day PRN wound  diphenhydrAMINE 50 milliGRAM(s) Oral every 6 hours PRN Extrapyramidal prophylaxis  diphenhydrAMINE Injectable 50 milliGRAM(s) IntraMuscular once PRN Extrapyramidal prophylaxis  haloperidol     Tablet 5 milliGRAM(s) Oral every 6 hours PRN agitation  haloperidol    Injectable 5 milliGRAM(s) IntraMuscular Once PRN agitation  LORazepam     Tablet 2 milliGRAM(s) Oral every 6 hours PRN Anxiety  LORazepam   Injectable 2 milliGRAM(s) IntraMuscular once PRN Agitation  nicotine  Polacrilex Gum 2 milliGRAM(s) Oral every 2 hours PRN breakthrough cravings  
MEDICATIONS  (STANDING):  benztropine 0.5 milliGRAM(s) Oral two times a day  LORazepam     Tablet 1 milliGRAM(s) Oral three times a day  melatonin 5 milliGRAM(s) Oral at bedtime  risperiDONE   Tablet 1 milliGRAM(s) Oral two times a day    MEDICATIONS  (PRN):  BACItracin   Ointment 1 Application(s) Topical two times a day PRN wound  diphenhydrAMINE 50 milliGRAM(s) Oral every 6 hours PRN Extrapyramidal prophylaxis  diphenhydrAMINE Injectable 50 milliGRAM(s) IntraMuscular once PRN Extrapyramidal prophylaxis  haloperidol     Tablet 5 milliGRAM(s) Oral every 6 hours PRN agitation  haloperidol    Injectable 5 milliGRAM(s) IntraMuscular Once PRN agitation  LORazepam     Tablet 2 milliGRAM(s) Oral every 6 hours PRN Anxiety  LORazepam   Injectable 2 milliGRAM(s) IntraMuscular once PRN Agitation  nicotine  Polacrilex Gum 2 milliGRAM(s) Oral every 2 hours PRN breakthrough cravings  
MEDICATIONS  (STANDING):  benztropine 0.5 milliGRAM(s) Oral two times a day  LORazepam     Tablet 1.5 milliGRAM(s) Oral three times a day  melatonin 5 milliGRAM(s) Oral at bedtime  risperiDONE   Tablet 1 milliGRAM(s) Oral two times a day    MEDICATIONS  (PRN):  BACItracin   Ointment 1 Application(s) Topical two times a day PRN wound  diphenhydrAMINE 50 milliGRAM(s) Oral every 6 hours PRN Extrapyramidal prophylaxis  diphenhydrAMINE Injectable 50 milliGRAM(s) IntraMuscular once PRN Extrapyramidal prophylaxis  haloperidol     Tablet 5 milliGRAM(s) Oral every 6 hours PRN agitation  haloperidol    Injectable 5 milliGRAM(s) IntraMuscular Once PRN agitation  LORazepam     Tablet 2 milliGRAM(s) Oral every 6 hours PRN Anxiety  LORazepam   Injectable 2 milliGRAM(s) IntraMuscular once PRN Agitation  nicotine  Polacrilex Gum 2 milliGRAM(s) Oral every 2 hours PRN breakthrough cravings  
MEDICATIONS  (STANDING):  benztropine 0.5 milliGRAM(s) Oral two times a day  LORazepam     Tablet 1 milliGRAM(s) Oral two times a day  melatonin 5 milliGRAM(s) Oral at bedtime  risperiDONE   Tablet 1 milliGRAM(s) Oral two times a day    MEDICATIONS  (PRN):  BACItracin   Ointment 1 Application(s) Topical two times a day PRN wound  diphenhydrAMINE 50 milliGRAM(s) Oral every 6 hours PRN Extrapyramidal prophylaxis  diphenhydrAMINE Injectable 50 milliGRAM(s) IntraMuscular once PRN Extrapyramidal prophylaxis  haloperidol     Tablet 5 milliGRAM(s) Oral every 6 hours PRN agitation  haloperidol    Injectable 5 milliGRAM(s) IntraMuscular Once PRN agitation  LORazepam     Tablet 2 milliGRAM(s) Oral every 6 hours PRN Anxiety  LORazepam   Injectable 2 milliGRAM(s) IntraMuscular once PRN Agitation  nicotine  Polacrilex Gum 2 milliGRAM(s) Oral every 2 hours PRN breakthrough cravings  
MEDICATIONS  (STANDING):  benztropine 0.5 milliGRAM(s) Oral two times a day  LORazepam     Tablet 1.5 milliGRAM(s) Oral three times a day  melatonin 5 milliGRAM(s) Oral at bedtime  risperiDONE   Tablet 1 milliGRAM(s) Oral two times a day    MEDICATIONS  (PRN):  BACItracin   Ointment 1 Application(s) Topical two times a day PRN wound  diphenhydrAMINE 50 milliGRAM(s) Oral every 6 hours PRN Extrapyramidal prophylaxis  diphenhydrAMINE Injectable 50 milliGRAM(s) IntraMuscular once PRN Extrapyramidal prophylaxis  haloperidol     Tablet 5 milliGRAM(s) Oral every 6 hours PRN agitation  haloperidol    Injectable 5 milliGRAM(s) IntraMuscular Once PRN agitation  LORazepam     Tablet 2 milliGRAM(s) Oral every 6 hours PRN Anxiety  LORazepam   Injectable 2 milliGRAM(s) IntraMuscular once PRN Agitation  nicotine  Polacrilex Gum 2 milliGRAM(s) Oral every 2 hours PRN breakthrough cravings  
MEDICATIONS  (STANDING):  benztropine 0.5 milliGRAM(s) Oral two times a day  LORazepam     Tablet 1 milliGRAM(s) Oral three times a day  melatonin 5 milliGRAM(s) Oral at bedtime  risperiDONE   Tablet 2 milliGRAM(s) Oral at bedtime  risperiDONE   Tablet 1 milliGRAM(s) Oral daily    MEDICATIONS  (PRN):  BACItracin   Ointment 1 Application(s) Topical two times a day PRN wound  diphenhydrAMINE 50 milliGRAM(s) Oral every 6 hours PRN Extrapyramidal prophylaxis  diphenhydrAMINE Injectable 50 milliGRAM(s) IntraMuscular once PRN Extrapyramidal prophylaxis  haloperidol     Tablet 5 milliGRAM(s) Oral every 6 hours PRN agitation  haloperidol    Injectable 5 milliGRAM(s) IntraMuscular Once PRN agitation  LORazepam   Injectable 2 milliGRAM(s) IntraMuscular once PRN Agitation  nicotine  Polacrilex Gum 2 milliGRAM(s) Oral every 2 hours PRN breakthrough cravings  
MEDICATIONS  (STANDING):  benztropine 0.5 milliGRAM(s) Oral two times a day  LORazepam     Tablet 1 milliGRAM(s) Oral three times a day  melatonin 5 milliGRAM(s) Oral at bedtime  risperiDONE   Tablet 2 milliGRAM(s) Oral at bedtime  risperiDONE   Tablet 1 milliGRAM(s) Oral daily    MEDICATIONS  (PRN):  BACItracin   Ointment 1 Application(s) Topical two times a day PRN wound  diphenhydrAMINE 50 milliGRAM(s) Oral every 6 hours PRN Extrapyramidal prophylaxis  diphenhydrAMINE Injectable 50 milliGRAM(s) IntraMuscular once PRN Extrapyramidal prophylaxis  haloperidol     Tablet 5 milliGRAM(s) Oral every 6 hours PRN agitation  haloperidol    Injectable 5 milliGRAM(s) IntraMuscular Once PRN agitation  LORazepam     Tablet 2 milliGRAM(s) Oral every 6 hours PRN Anxiety  LORazepam   Injectable 2 milliGRAM(s) IntraMuscular once PRN Agitation  nicotine  Polacrilex Gum 2 milliGRAM(s) Oral every 2 hours PRN breakthrough cravings

## 2022-10-19 NOTE — BH INPATIENT PSYCHIATRY PROGRESS NOTE - NSBHATTESTTYPEVISIT_PSY_A_CORE
Nephrology Progress Note          Assessment and Plan:   ARF continues rapid improvement; assume FIGUEROA due to ATN.  Anticipate return to baseline.  I think spironolactone could be resumed at any time.  Continue Qday chemistries while in-pt.  I will not see pt daily but will follow in the background.  Please call with questions.         Hepatic encephalopathy (H)    * No resolved hospital problems. *               Interval History:   no new complaints and doing well; no cp, sob, n/v/d, or abd pain.  Same confused alert state.  VS ok.  BP remains nl.  Good intake.  Voiding often; amt unknown.  Wt staying about the same.  Meds and labs reviewed.  Note switch to ertapenem with ESBL.  Lytes remain ok.  Cr down to ~1.7 and BUN also lower.  CBC stable with Hgb >8 after RBC's.                  Medications:       cyanocobalamin  1,000 mcg Oral Daily     diclofenac  2 g Transdermal TID     ertapenem (INVanz) IV  1 g Intravenous Q24H     folic acid  1 mg Oral Daily     gabapentin  100 mg Oral BID     lactulose  20 g Oral Daily     lidocaine  1 patch Transdermal Q24h    And     lidocaine   Transdermal Q24H    And     lidocaine   Transdermal Q8H     multivitamin, therapeutic  1 tablet Oral Daily     omeprazole  20 mg Oral Daily     sodium chloride (PF)  3 mL Intracatheter Q8H     traZODone  50 mg Oral At Bedtime     vilazodone  40 mg Oral Daily     vitamin B1  100 mg Oral Daily     vitamin D3  2,000 Units Oral Daily                      Physical Exam:       Vital Sign Ranges  Temp:  [97.2  F (36.2  C)-98.8  F (37.1  C)] 98.3  F (36.8  C)  Pulse:  [] 112  Heart Rate:  [95] 95  Resp:  [18-20] 20  BP: ()/(40-62) 120/61  SpO2:  [95 %-100 %] 99 %    Weight, current:  72.2 kg (actual weight)  Weight change: 1.179 kg (2 lb 9.6 oz)    I/O last 3 completed shifts:  In: 1008.75 [P.O.:700]  Out: 300 [Urine:300]    Physical Exam:   General:  Patient comfortable, in no apparent distress.  Awake, alert, oriented x3.  Neck:  Supple, 
no JVD.  Lungs:  Clear to auscultation bilaterally.  Cardiac:  Regular rate and rhythm, no murmurs, rub, or gallops.  Abdomen:  Soft, nontender, physiologic sounds.  Extremities:  Same edema.  2+ pulses.  Skin:  Warm, dry.  Neurologic:  No focal deficits.             Data:        Lab Results   Component Value Date     11/13/2019    Lab Results   Component Value Date    CHLORIDE 113 (H) 11/13/2019    Lab Results   Component Value Date    BUN 27 11/13/2019      Lab Results   Component Value Date    POTASSIUM 3.6 11/13/2019    Lab Results   Component Value Date    CO2 21 11/13/2019    Lab Results   Component Value Date    CR 1.69 (H) 11/13/2019        Lab Results   Component Value Date     11/13/2019     11/12/2019     11/11/2019     Lab Results   Component Value Date    POTASSIUM 3.6 11/13/2019    POTASSIUM 3.7 11/12/2019    POTASSIUM 3.3 (L) 11/11/2019     Lab Results   Component Value Date    CHLORIDE 113 (H) 11/13/2019    CHLORIDE 112 (H) 11/12/2019    CHLORIDE 109 11/11/2019     Lab Results   Component Value Date    CO2 21 11/13/2019    CO2 20 11/12/2019    CO2 20 11/11/2019     Lab Results   Component Value Date    CR 1.69 (H) 11/13/2019    CR 2.39 (H) 11/12/2019    CR 2.98 (H) 11/11/2019     Lab Results   Component Value Date    BUN 27 11/13/2019    BUN 32 (H) 11/12/2019    BUN 36 (H) 11/11/2019     Lab Results   Component Value Date    HGB 8.2 (L) 11/13/2019    HGB 8.0 (L) 11/12/2019    HGB 6.5 (LL) 11/12/2019     No results found for: PH, PHARTERIAL, PO2, [id number], SAT, PCO2, HCO3, BASEEXCESS, ALETHEA, BEB          Edgard Mcghee MD  Nephrology; [employer]  [phone]      
Attending Only
On-site Attending supervising LETY (99XXX codes)
Attending Only

## 2022-10-19 NOTE — BH INPATIENT PSYCHIATRY DISCHARGE NOTE - HPI (INCLUDE ILLNESS QUALITY, SEVERITY, DURATION, TIMING, CONTEXT, MODIFYING FACTORS, ASSOCIATED SIGNS AND SYMPTOMS)
Patient was seen and evaluated, chart, medications and labs reviewed. Case discussed with nursing team.  On service for this 22 year old single male.  Patient has 1 prior psych admission on the adolescent unit for psychosis.  One admission in  at [facility] for bizarre behavior - had significant work up including LP, EEG. received outpatient tx with [facility] until pt tapered off medication in 2018.  Patient reports cannabis use.  Patient is hospitalized with a primary problem of psychotic decompensation.  Patient admitted to [facility] on a 9.39 legal status. I have reviewed the initial psychiatric assessment in the electronic medical record, including the history of present illness, past psychiatric history, family/social history (no pertinent changes), and exam, and have confirmed the salient findings dated   10/7/22.    As per chart review, transferring records indicated the followinM, domiciled with family, employed in security, with 1 prior psych admission on the adolescent unit for psychosis, no known prior suicide attempt or violence, regular cannabis use, no known withdrawal, no known legal issues, [facility] hx of bilateral hearing loss at birth, recent medical admission at [facility] for 'cannabis intoxication delirium' discharged 2 days ago, referred by Crisis Center, where family brought patient for evaluation due to persistent psychosis, to assess need for involuntary hospitalization.   Patient observed sitting in the chair, mumbling to self incoherently. He reluctantly followed writer into a room for private interview, did not sit on the bed. Speech was largely unintelligible, rapid and incoherent. At one point pt grabbed his head and stated "too many voices" but then looked at writer with intense eye contact and stated "they are gone now". He denies CAH, denies suicidal ideation and homicidal ideation. Writer confronted pt on symptoms reported from crisis center (ie that he is a doctor and ). 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 When asked if he uses cannabis, he states "carolina reaper", denies drinking today.  From [facility]: "Patient presents as incoherent and illogical, confused, speech is mumbled at time. He believes he is current home or in a conference room, he is distracted and c/o of . At times thinks his sister is a nurse. He believes he is a neurosurgeon and making gestures as if he is performing a medical procedure. He talks about being  and his wife is “Tamara Valdez.” If shown picture of family members, he is unable to identify them at times. At night, he is pacing, doing repetitive behaviors, taking multiple showers – showered 4x last night. He mumbles often, reports some agitation when reality tested but is not violent. He reports he is unable to sleep due to telekinesis and hallucination. His family has been taking time off from work to be with him, worried he may wonder away and get lost if left alone. They feel they can no longer manage him at home."      On unit: information received from: Chart review and patient interview.  Patient is followed up for psychotic decompensation and cannabis use. Admitting utox +THC, recent medical admission at [facility] for 'cannabis intoxication delirium' discharged 2 days ago.  Patient is discussed with nursing staff. Patient took medications last evening. No behavioral concerns, no prns for aggression. However patient is bizarre and odd on unit.  He is observed following peers and staff on unit, running in hallway, bizarre gestures in hallway.   Eating and sleeping well.   Patient is observed in interview room.  He is guarded upon approach, hesitant to engage in interview.  He is notably bizarre, odd, paranoid and suspicious.  When questioned about his mood pt unable to identify any mood symptoms. Patient shaks his head yes.  Patient is asked to clarify pt stares at writer blankly.  Pt selective mute during interview.   Attempted to discuss precipitating events leading to current admission and why he is here . Patient is challenging to interview, poor/limited historian.    During interview patient’s speech was unintelligible, mumbling speech at times but most of interview pt selectively mute.   Appears with perceptual disturbances.

## 2022-10-19 NOTE — BH INPATIENT PSYCHIATRY PROGRESS NOTE - NSTXCOPEDATETRGT_PSY_ALL_CORE
14-Oct-2022
22-Oct-2022
22-Oct-2022
14-Oct-2022
22-Oct-2022

## 2022-10-19 NOTE — BH INPATIENT PSYCHIATRY PROGRESS NOTE - NSBHFUPINTERVALCCFT_PSY_A_CORE
Patient seen, chart reviewed, case discussed with team.  Patient seen for follow up of psychosis. 
psychosis
Patient seen, chart reviewed, case discussed with team.  Patient seen for follow up of psychosis. 
Patient seen, chart reviewed, case discussed with team.  Patient seen for discharge day management of catatonia and psychosis.

## 2022-10-19 NOTE — BH INPATIENT PSYCHIATRY PROGRESS NOTE - NSBHMSESPABN_PSY_A_CORE
Impaired articulation/Other
Soft volume/Increased productivity/Impaired articulation/Other
Impaired articulation/Other
Soft volume/Increased productivity/Impaired articulation/Other
Soft volume/Decreased productivity/Impaired articulation
Soft volume/Increased productivity/Impaired articulation/Other
Impaired articulation/Other

## 2022-10-19 NOTE — BH DISCHARGE NOTE NURSING/SOCIAL WORK/PSYCH REHAB - NSDCPRGOAL_PSY_ALL_CORE
Writer met with pt. to safety plan for discharge and discuss the pt. progress throughout inpatient stay. Pt. required redirection throughout the interview and constant explanation. Pt. through process was clouded and he was not fully understanding the questions presented to him but he was able to follow as the explanations became more detailed. At admission pt. presented as bizarre and isolative. During inpatient stay the pt. was medication compliant, attended some groups and was compliant with treatment team. At discharge the pt. stated that he is ready to leave. Pt. made progress towards established psych rehab goal as evidenced by pt. being able to clearly communicate coping skills and how he is going to use them outside. Pt. denies SI/HI/TH/AH/VH

## 2022-10-19 NOTE — BH INPATIENT PSYCHIATRY PROGRESS NOTE - NSBHCHARTREVIEWVS_PSY_A_CORE FT
Vital Signs Last 24 Hrs  T(C): 36.3 (10-19-22 @ 06:44), Max: 36.3 (10-18-22 @ 19:08)  T(F): 97.4 (10-19-22 @ 06:44), Max: 97.4 (10-18-22 @ 19:08)  HR: --  BP: --  BP(mean): --  RR: --  SpO2: --    Orthostatic VS  10-19-22 @ 06:44  Lying BP: --/-- HR: --  Sitting BP: 107/71 HR: 98  Standing BP: 110/68 HR: 103  Site: upper left arm  Mode: electronic  Orthostatic VS  10-18-22 @ 19:08  Lying BP: --/-- HR: --  Sitting BP: 131/87 HR: 106  Standing BP: 133/85 HR: 107  Site: --  Mode: --  Orthostatic VS  10-18-22 @ 06:32  Lying BP: --/-- HR: --  Sitting BP: 121/78 HR: 91  Standing BP: 120/86 HR: 96  Site: --  Mode: --  Orthostatic VS  10-17-22 @ 19:21  Lying BP: --/-- HR: --  Sitting BP: 113/73 HR: 103  Standing BP: 118/74 HR: 110  Site: --  Mode: --

## 2022-10-19 NOTE — BH INPATIENT PSYCHIATRY PROGRESS NOTE - NSBHMSEBEHAV_PSY_A_CORE
Cooperative/Other
Other
Cooperative/Other
Cooperative
Cooperative/Other

## 2022-10-19 NOTE — BH INPATIENT PSYCHIATRY PROGRESS NOTE - NSTXPSYCHODATEEST_PSY_ALL_CORE
15-Oct-2022
07-Oct-2022
15-Oct-2022
07-Oct-2022
15-Oct-2022

## 2022-10-19 NOTE — BH INPATIENT PSYCHIATRY DISCHARGE NOTE - HOSPITAL COURSE
to be done 22 year old single male.  Patient has 1 prior psych admission on the adolescent unit for psychosis.  One admission in 2014 at [facility] for bizarre behavior - had significant work up including LP, EEG. received outpatient tx with [facility] until pt tapered off medication in 2018.  Patient reports cannabis use.  Presentation was initially termed a "psychotic decompensation".   Given that he did well for a number of years since his previous hospitalization and both occurred, in the setting of increased cannabis use we thought  a primary psychotic disorder was unlikely.    Cannabis use was implicated in his presentation.  However, the duration of his symptoms argued that his symptoms were not simply cannabis intoxication. However, he evidenced psychotic and catatonic symptoms and was arguably somewhat mood elevated, presenting us with a differential of manic episode with psychosis, substance induced psychosis and also catatonia.      This is a young man who was able to attend college for a period of time.  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  His early presentation was not consistent with this.  He was extremely though disordered.  There was a lot of standing/posturing, echophenomena and at times collin withdraw which we felt was evidence of catatona.  He was also noted to verbalize believing that he was a doctor, that he was , that he saw hallucinations of dogs, etc.  We felt that although these beliefs were note "true" in reality he did not hold them with conviction consistent with a delusion and we felt they were somewhat more akin to verbigeration in catatonia.  There are numerous case reports of catatonia induced by cannabis use.      He did well previously on risperidone and ativan and we started a regimen that resembled regimen form previous hospitalization.  He was given risperidone one mg twice daily and started on ativan one mg twice daily,titrated to 1.5mg three times daily with good improvement.  He was better related but still with some thought disorder so we increased risperidone from one mg twice daily to one mg q am and two mg at bedtime and he continued to improve.  Given that he was much less catatonic we will reduce ativan to one mg three times daily.  His relatedness and catatonic symptoms improved.  There was some residual continuing thought idosrder, although this was also much improved.  He has improved and is not an acute danger to himself or others.      He was not a management problem on the unit, did not require IM prns nor were there frankly inappropriate behaviors. His symptoms improved.  At baseline he has a speech impediment because of his hearing loss.  His speech impediment improved throughout hospitalization.      We counseled him on the risks of continued cannabis use given the ongoing symptoms it elicits in him and obtained an [facility] referral for him.    He is discharged on ativan now one mg three times daily for catatonia (two week supply) and recommend a slow taper.  He also leaves with a month supply of risperidone one mg q am and two mg at bedtime that can likely be tapered soon, as well as a month supply of cogentin 0.5mg twice daily for eps prophylaxis.

## 2022-10-19 NOTE — BH INPATIENT PSYCHIATRY PROGRESS NOTE - NSTXDISORGGOAL_PSY_ALL_CORE
Will make at least 3 goal and reality oriented statements during therapy
Will identify 2 coping skills that assist in organizing
Will make at least 3 goal and reality oriented statements during therapy
Will identify 2 coping skills that assist in organizing
Will identify 2 coping skills that assist in organizing
Will make at least 3 goal and reality oriented statements during therapy

## 2022-10-19 NOTE — BH INPATIENT PSYCHIATRY PROGRESS NOTE - NSBHMSELANG_PSY_A_CORE
Other/Unable to assess
Other/Unable to assess
Unable to assess
Other/Unable to assess
Other/Unable to assess
Unable to assess
Other/Unable to assess
Unable to assess
Other/Unable to assess

## 2022-10-19 NOTE — BH INPATIENT PSYCHIATRY PROGRESS NOTE - NSCGISEVERILLNESS_PSY_ALL_CORE
5 = Markedly ill - intrusive symptoms that distinctly impair social/occupational function or cause intrusive levels of distress
4 = Moderately ill – overt symptoms causing noticeable, but modest, functional impairment or distress; symptom level may warrant medication
6 = Severely ill - disruptive pathology, behavior and function are frequently influenced by symptoms, may require assistance from others
5 = Markedly ill - intrusive symptoms that distinctly impair social/occupational function or cause intrusive levels of distress

## 2022-10-24 ENCOUNTER — OUTPATIENT (OUTPATIENT)
Dept: OUTPATIENT SERVICES | Facility: HOSPITAL | Age: 22
LOS: 1 days | Discharge: HOME | End: 2022-10-24

## 2022-11-18 DIAGNOSIS — F12.10 CANNABIS ABUSE, UNCOMPLICATED: ICD-10-CM

## 2023-02-16 NOTE — H&P ADULT - PMH
NEPHROLOGY PROGRESS NOTE    Reason for consultation: Management of chronic kidney disease stage IV. Consulting physician: Alessia Munoz MD.    Interval history:   Patient seen and examined today, denies any new complains, breathing is stable on RA  Patient underwent cardiac catheterization at [facility] [2/14/2023] with finding of wild-type vessel coronary artery disease although LIMA-LAD, SVG-D and SVG-OM1 were patent but she required drug-eluting stent placement in mid right coronary artery. Diuretics were held prior to procedure and she was gently hydrated. S  serum creatinine stable at 2.7 mg/dl. UOP not recorded. History of Present Illness: This is a 57 y.o. female with history of hypertension, longstanding diabetes mellitus with diabetic nephropathy, Coronary artery disease status post CABG x 3  Chronic CHF with diastolic dysfunction, who presents the [facility] with syncopal episode. Patient has underlying CKD stage IV secondary to diabetic nephropathy. and previous history of dialysis dependency which was  discontinued Aug 2022 due to regaining residual kidney function. She recently was discharged from [facility] on 2/8/2023  after presenting with weight gain and shortness of breath, acute hypoxic respiratory failure secondary to decompensated acute on chronic diastolic heart failure and Hypertensive urgency. Patient was optimally diuresed and  was discharged to [address] [place] [address] on a regimen of Bumex 3 mg twice daily and Metolazone 5 mg every other day. She reports passing out at the [facility] while standing up and attempting to walk across her room yesterday. She has recent onset of lightheadedness 4 days prior . She was  hypotensive and reports two of her  blood pressure medications were held. She  was found to have a UTI isolating  Enterococcus faecalis on 2/7/2022 sensitive to Cipro and commenced on treatment with ciprofloxacin. She denies any flank pain. She has been experiencing dysuria and urgency. No fever, nausea vomiting . She has mild decrease in oral intake. Serum creatinine had risen to 3.3 mg/dl on 2/7/2023 and on presentation to [facility] on 2/11/2023 creatinine was 3.04 mg/dL. Creatinine today is 2.96 mg/dL with BUN of 65, potassium 3.4 and sodium of 145. She has normal white count and hemoglobin of 10.6  She was previously on dialysis initiated in August 2021 following episode of ATN after contrast exposure. She had a brief period of transient recovery of renal function between February and April 2022 Following which dialysis was restarted due to volume overload resistant to diuretic treatment. She reports she had experienced persistent dizziness with dialysis with suspected dialysis disequilibrium syndrome . She was given a trial off dialysis in August 2022 and has not required dialysis since then. Volume overload has been managed with diuretics. Patient's current baseline creatinine has ranged between  2.6 to 3.0 mg/dL. She had previously followed up with Dr. Nitin Waldron and currently receives nephrological care with [facility].     Current Medications:    benzonatate (TESSALON) capsule 200 mg, TID  bumetanide (BUMEX) tablet 1 mg, BID  ticagrelor (BRILINTA) tablet 90 mg, BID  atorvastatin (LIPITOR) tablet 80 mg, Nightly  dextromethorphan (DELSYM) 30 MG/5ML extended release liquid 60 mg, 2 times per day  melatonin tablet 9 mg, Nightly PRN  sodium chloride flush 0.9 % injection 10 mL, PRN  aspirin chewable tablet 81 mg, Daily  docusate sodium (COLACE) capsule 100 mg, BID  linaclotide (LINZESS) capsule 145 mcg, QAM AC  pantoprazole (PROTONIX) tablet 40 mg, QAM AC  allopurinol (ZYLOPRIM) tablet 100 mg, Daily  sodium chloride flush 0.9 % injection 5-40 mL, 2 times per day  sodium chloride flush 0.9 % injection 5-40 mL, PRN  0.9 % sodium chloride infusion, PRN  polyethylene glycol (GLYCOLAX) packet 17 g, Daily PRN  acetaminophen (TYLENOL) tablet 650 mg, Q6H PRN   Or  acetaminophen (TYLENOL) suppository 650 mg, Q6H PRN  ondansetron (ZOFRAN-ODT) disintegrating tablet 4 mg, Q8H PRN   Or  ondansetron (ZOFRAN) injection 4 mg, Q6H PRN  heparin (porcine) injection 5,000 Units, 3 times per day  glucose chewable tablet 16 g, PRN  dextrose bolus 10% 125 mL, PRN   Or  dextrose bolus 10% 250 mL, PRN  glucagon (rDNA) injection 1 mg, PRN  dextrose 10 % infusion, Continuous PRN  gabapentin (NEURONTIN) capsule 300 mg, Daily  carvedilol (COREG) tablet 6.25 mg, BID  amLODIPine (NORVASC) tablet 5 mg, Daily  insulin glargine (LANTUS) injection vial 45 Units, BID  insulin lispro (HUMALOG) injection vial 0-16 Units, TID WC  insulin lispro (HUMALOG) injection vial 0-4 Units, Nightly    Objective:  CURRENT TEMPERATURE:  Temp: 98.1 °F (36.7 °C)  MAXIMUM TEMPERATURE OVER 24HRS:  Temp (24hrs), Av °F (36.7 °C), Min:97.8 °F (36.6 °C), Max:98.1 °F (36.7 °C)    CURRENT RESPIRATORY RATE:  Resp: 22  CURRENT PULSE:  Heart Rate: 58  CURRENT BLOOD PRESSURE:  BP: (!) 116/55  24HR BLOOD PRESSURE RANGE:  Systolic (40QYG), IWT:870 , Min:116 , GGL:495   ; Diastolic (16ETJ), EG, Min:55, Max:65    24HR INTAKE/OUTPUT:  No intake or output data in the 24 hours ending 23 1231  No data found. Physical Exam:  GENERAL APPEARANCE: Alert and cooperative, and appears to be in no acute distress. HEAD: normocephalic  EYES:EOMI. Not pale, anicteric   NOSE:  No nasal discharge. NECK:trachea midline  CARDIAC: Normal S1 and S2. No S3, S4 or murmurs. Rhythm is regular. LUNGS: Clear to auscultation and percussion without rales, rhonchi, wheezing or diminished breath sounds. ABDOMEN: soft non distended, BS+ Non tender no organomegally  MUSKULOSKELETAL: Adequately aligned spine. No joint erythema or tenderness. EXTREMITIES: No edema. Peripheral pulses intact.    NEURO:Alert oriented x 3 ,power 5/5 in all extremities    Labs:   CBC:  Recent Labs     23  0544 02/15/23  0724 02/16/23  0602   WBC 5.1 6.2 5.7   RBC 3.64* 3.67* 3.52*   HGB 11.3* 11.7* 11.1*   HCT 34.5* 34.4* 33.2*   MCV 94.7 93.8 94.3   MCH 31.1 31.8 31.7   MCHC 32.9 33.9 33.6   RDW 15.0* 14.6 14.9    190 173   MPV 9.0 9.2 9.0      BMP:   Recent Labs     02/14/23  0544 02/15/23  0724 02/16/23  0602    141 143   K 3.8 3.7 3.8   CL 98 100 101   CO2 31 31 30   BUN 70* 60* 59*   CREATININE 3.01* 2.62* 2.74*   GLUCOSE 269* 262* 139*   CALCIUM 8.9 8.9 9.1      IRON:    Lab Results   Component Value Date/Time    IRON 83 09/27/2022 01:32 PM     TIBC:    Lab Results   Component Value Date/Time    TIBC 217 09/27/2022 01:32 PM     FERRITIN:    Lab Results   Component Value Date/Time    FERRITIN 117 09/27/2022 01:32 PM     SPEP:   Lab Results   Component Value Date/Time    PROT 6.4 02/07/2023 05:45 AM     UPEP:   Lab Results   Component Value Date/Time    TPU 20 11/12/2021 10:30 AM      Urine Sodium:    Lab Results   Component Value Date/Time    JASON 47 11/14/2021 02:04 AM      Urine Creatinine:    Lab Results   Component Value Date/Time    LABCREA 65.4 01/26/2023 01:10 PM     Urine Protein:    Lab Results   Component Value Date/Time    TPU 20 11/12/2021 10:30 AM     Urinalysis:  U/A:   Lab Results   Component Value Date/Time    NITRU NEGATIVE 02/07/2023 03:00 PM    COLORU Yellow 02/07/2023 03:00 PM    PHUR 5.5 02/07/2023 03:00 PM    WBCUA 2 TO 5 02/07/2023 03:00 PM    RBCUA None 02/07/2023 03:00 PM    MUCUS 1+ 02/07/2023 03:00 PM    TRICHOMONAS NOT REPORTED 11/14/2021 02:05 AM    YEAST FEW 02/07/2023 03:00 PM    BACTERIA MANY 02/07/2023 03:00 PM    SPECGRAV 1.016 02/07/2023 03:00 PM    LEUKOCYTESUR SMALL 02/07/2023 03:00 PM    UROBILINOGEN Normal 02/07/2023 03:00 PM    BILIRUBINUR NEGATIVE 02/07/2023 03:00 PM    GLUCOSEU NEGATIVE 02/07/2023 03:00 PM    KETUA NEGATIVE 02/07/2023 03:00 PM    AMORPHOUS 2+ 07/31/2022 12:00 PM     Assessment/plan:    1.   Chronic kidney disease stage IV [baseline serum creatinine 2.6 to 3.0 No pertinent past medical history mg/dL over the past few months] - secondary to diabetic nephropathy. Renal function is stable and there is no evidence of contrast nephropathy at this time. Scr 2.7 mg/dl stable    2. Systemic hypertension - blood pressure is adequately controlled. 3.  Syncope associated with orthostasis. Clinically improved. 4.  Coronary artery disease s/p CABG - Patient required treatment of drug eluting stent into right coronary artery 2/14/2023.    5.  Enterococcus faecalis urinary tract infection (2/7/2023) - Completed course of IV ceftriaxone with repeat blood culture negative on 2/13/2023. Prognosis is guarded.     Electronically signed by Ninoska Pennington MD on 2/16/2023 at 12:31 PM

## 2023-03-05 NOTE — BH DISCHARGE NOTE NURSING/SOCIAL WORK/PSYCH REHAB - NSFLUVACAGEDISCH_IMM_ALL_CORE
Adult ,DirectAddress_Unknown ,DirectAddress_Unknown,[email or web address]

## 2024-04-04 NOTE — ED BEHAVIORAL HEALTH ASSESSMENT NOTE - OTHER PAST PSYCHIATRIC HISTORY (INCLUDE DETAILS REGARDING ONSET, COURSE OF ILLNESS, INPATIENT/OUTPATIENT TREATMENT)
[Chaperone Declined] : Patient declined chaperone [Appropriately responsive] : appropriately responsive [Alert] : alert [No Acute Distress] : no acute distress [No Lymphadenopathy] : no lymphadenopathy [Regular Rate Rhythm] : regular rate rhythm [No Murmurs] : no murmurs [Clear to Auscultation B/L] : clear to auscultation bilaterally [Soft] : soft [Non-tender] : non-tender [Non-distended] : non-distended [No HSM] : No HSM [No Lesions] : no lesions [No Mass] : no mass [Oriented x3] : oriented x3 [Examination Of The Breasts] : a normal appearance [No Masses] : no breast masses were palpable [Labia Majora] : normal [Labia Minora] : normal [Normal] : normal [Uterine Adnexae] : normal one admission in 2014 at [facility] for bizarre behavior - had significant work up including LP, EEG. received outpatient tx with [facility] until pt tapered off medication in 2018. more recently admitted to medicine for 1 week at [facility] for cannabis intoxication delirium - had one night of Risperdal 4 and Zyprexa 5. Then on Lexapro 10 and Abilify 5 and Melatonin 5. no suicide attempt.

## 2024-06-10 ENCOUNTER — OUTPATIENT (OUTPATIENT)
Dept: OUTPATIENT SERVICES | Facility: HOSPITAL | Age: 24
LOS: 1 days | Discharge: HOME | End: 2024-06-10

## 2024-06-10 ENCOUNTER — APPOINTMENT (OUTPATIENT)
Dept: OTOLARYNGOLOGY | Facility: CLINIC | Age: 24
End: 2024-06-10
Payer: COMMERCIAL

## 2024-06-10 VITALS — HEIGHT: 68 IN | BODY MASS INDEX: 38.23 KG/M2 | WEIGHT: 252.25 LBS

## 2024-06-10 PROCEDURE — 92567 TYMPANOMETRY: CPT

## 2024-06-10 PROCEDURE — 92557 COMPREHENSIVE HEARING TEST: CPT

## 2024-06-10 PROCEDURE — 99204 OFFICE O/P NEW MOD 45 MIN: CPT

## 2024-06-25 RX ORDER — CIPROFLOXACIN AND DEXAMETHASONE 3; 1 MG/ML; MG/ML
0.3-0.1 SUSPENSION/ DROPS AURICULAR (OTIC) TWICE DAILY
Qty: 1 | Refills: 3 | Status: ACTIVE | COMMUNITY
Start: 2024-06-10

## 2024-06-25 NOTE — REASON FOR VISIT
[Initial Evaluation] : an initial evaluation for [FreeTextEntry2] : c/o of  failed hearing test at physical location and would like to a clearance letter from a new audio testno complaints

## 2024-06-25 NOTE — HISTORY OF PRESENT ILLNESS
[de-identified] : 22 yo with hx of bilateral SNHL and psychosis on abilify presents here saying that he had to get a physical for a new job however their audiogram machine wasn't working so they referred him here. Patient's last hearing test was around 2016. Patient was given hearing aids but doesn't really wear them. Patient states he has difficulty hearing low volume sounds. Denies tinnitus. Patient has no other concerns at this time. Denies Family history of sister or brother hearing loss

## 2024-06-25 NOTE — DATA REVIEWED
[de-identified] : An audiogram was ordered and performed including pure tones, tympanometry and speech testing for the patients complaint of hearing loss I have independently reviewed the patient's audiogram from today and my findings include  AU Mild to profound SNHL [id number] hz. AU Tymp A. WRS AD 60% AS 56%

## 2024-06-25 NOTE — ASSESSMENT
[FreeTextEntry1] : 23 year M present with bilateral SNHL and right ear canal granulation. Patient tolerated cerumen removal without complaints.   History of Passed NBS. Loss discovered at age 3 and wore HA's from that point until about age 12. Stopped at that time b/c they HA's were broken and was unable to purchase new ones. Was previously sent to genetic testing by Dr. Rojas but never completed  5/7/2016 As per radiology CT IAC shows Vestibular aqueducts within normal limits bilaterally  Personally reviewed Audiogram shows AU Mild to profound SNHL 250-8k hz. AU Tymp A. WRS AD 60% AS 56%. Compared to 1/25/2016 Puretone stable however WRS AD 80 AS 68 has worsen.  Physical exam shows right ear canal granulation, Right debris. Removed with suction. Left Ear Normal EAC/TM.  Recommend Right Ear canal Granulation  - Start Ciprodex drops BID for affected ear for 14 Days - Educated patient on keeping ears as dry as possible, Avoid sticking anything into the ear canal (i.e. q-tips, hairpins to clean ears), Avoid swimming in polluted wagner, To keep ears dry patient can use hair dryer to blow warm air back and forth over the ear canal.  SNHL -Discussed Benefit of Hearings Aids and their value of helping keep brain stimulated by helping hear conversation which keeps a person active and socially connected. Stressed also the association with a lower risk of incident dementia and slower cognitive decline. -Clearance Hearing Aid Evaluation Given  -Return to clinic 3 weeks or sooner if new/worsen symptoms present

## 2024-06-26 DIAGNOSIS — H90.3 SENSORINEURAL HEARING LOSS, BILATERAL: ICD-10-CM

## 2024-06-26 DIAGNOSIS — L92.9 GRANULOMATOUS DISORDER OF THE SKIN AND SUBCUTANEOUS TISSUE, UNSPECIFIED: ICD-10-CM

## 2024-06-26 DIAGNOSIS — H93.293 OTHER ABNORMAL AUDITORY PERCEPTIONS, BILATERAL: ICD-10-CM

## 2024-07-01 ENCOUNTER — APPOINTMENT (OUTPATIENT)
Dept: OTOLARYNGOLOGY | Facility: CLINIC | Age: 24
End: 2024-07-01
Payer: MEDICAID

## 2024-07-01 VITALS
WEIGHT: 252 LBS | SYSTOLIC BLOOD PRESSURE: 122 MMHG | DIASTOLIC BLOOD PRESSURE: 87 MMHG | HEART RATE: 67 BPM | HEIGHT: 68 IN | BODY MASS INDEX: 38.19 KG/M2

## 2024-07-01 DIAGNOSIS — H93.293 OTHER ABNORMAL AUDITORY PERCEPTIONS, BILATERAL: ICD-10-CM

## 2024-07-01 DIAGNOSIS — H90.3 SENSORINEURAL HEARING LOSS, BILATERAL: ICD-10-CM

## 2024-07-01 DIAGNOSIS — L92.9 GRANULOMATOUS DISORDER OF THE SKIN AND SUBCUTANEOUS TISSUE, UNSPECIFIED: ICD-10-CM

## 2024-07-01 PROCEDURE — 99214 OFFICE O/P EST MOD 30 MIN: CPT

## 2024-07-01 RX ORDER — ELECTROLYTES/DEXTROSE
SOLUTION, ORAL ORAL
Refills: 0 | Status: ACTIVE | COMMUNITY

## 2024-07-01 RX ORDER — ARIPIPRAZOLE 2 MG/1
2 TABLET ORAL
Refills: 0 | Status: ACTIVE | COMMUNITY

## 2024-07-01 RX ORDER — DEXAMETHASONE SODIUM PHOSPHATE 1 MG/ML
0.1 SOLUTION/ DROPS OPHTHALMIC TWICE DAILY
Qty: 1 | Refills: 2 | Status: ACTIVE | COMMUNITY
Start: 2024-07-01 | End: 1900-01-01

## 2024-07-30 NOTE — ED ADULT NURSE NOTE - CHIEF COMPLAINT QUOTE
Per sister pt was out last night came home at 6am with confusin and seeing things
[FreeTextEntry2] : as noted in HPI

## 2024-08-19 ENCOUNTER — APPOINTMENT (OUTPATIENT)
Dept: MRI IMAGING | Facility: HOSPITAL | Age: 24
End: 2024-08-19

## 2024-08-21 ENCOUNTER — APPOINTMENT (OUTPATIENT)
Dept: MRI IMAGING | Facility: HOSPITAL | Age: 24
End: 2024-08-21

## 2024-08-21 ENCOUNTER — RESULT REVIEW (OUTPATIENT)
Age: 24
End: 2024-08-21

## 2024-08-26 ENCOUNTER — APPOINTMENT (OUTPATIENT)
Dept: OTOLARYNGOLOGY | Facility: CLINIC | Age: 24
End: 2024-08-26
Payer: MEDICAID

## 2024-08-26 VITALS
WEIGHT: 252 LBS | SYSTOLIC BLOOD PRESSURE: 125 MMHG | DIASTOLIC BLOOD PRESSURE: 80 MMHG | BODY MASS INDEX: 38.19 KG/M2 | HEIGHT: 68 IN | HEART RATE: 65 BPM

## 2024-08-26 DIAGNOSIS — L92.9 GRANULOMATOUS DISORDER OF THE SKIN AND SUBCUTANEOUS TISSUE, UNSPECIFIED: ICD-10-CM

## 2024-08-26 DIAGNOSIS — H90.3 SENSORINEURAL HEARING LOSS, BILATERAL: ICD-10-CM

## 2024-08-26 DIAGNOSIS — H93.293 OTHER ABNORMAL AUDITORY PERCEPTIONS, BILATERAL: ICD-10-CM

## 2024-08-26 DIAGNOSIS — H91.93 UNSPECIFIED HEARING LOSS, BILATERAL: ICD-10-CM

## 2024-08-26 PROCEDURE — 99213 OFFICE O/P EST LOW 20 MIN: CPT

## 2024-08-26 NOTE — ASSESSMENT
[FreeTextEntry1] : 24 year M follow up for with bilateral SNHL and right ear canal granulation. Patient tolerated cerumen removal without complaints.   History of Passed NBS. Loss discovered at age 3 and wore HA's from that point until about age 12. Stopped at that time b/c they HA's were broken and was unable to purchase new ones. Was previously sent to genetic testing by Dr. Rojas but never completed  5/7/2016 As per radiology CT IAC shows Vestibular aqueducts within normal limits bilaterally  Personally reviewed Audiogram shows AU Mild to profound SNHL 250-8k hz. AU Tymp A. WRS AD 60% AS 56%. Compared to 1/25/2016 Puretone stable however WRS AD 80 AS 68 has worsen.  Physical exam shows right ear granulation starting superior cover 70% TM. Left Ear Normal EAC/TM.  Recommend Right Ear canal Granulation  - Stop Dexamethasone drops BID for affected ear for 21 Days - Educated patient on keeping ears as dry as possible, Avoid sticking anything into the ear canal (i.e. q-tips, hairpins to clean ears), Avoid swimming in polluted wagner, To keep ears dry patient can use hair dryer to blow warm air back and forth over the ear canal. -Referral to Dr. Thomas Tucker for persistent granulation tissue despite multiple course of Ciprodex and Dexamethasone drops.  SNHL -Discussed Benefit of Hearings Aids and their value of helping keep brain stimulated by helping hear conversation which keeps a person active and socially connected. Stressed also the association with a lower risk of incident dementia and slower cognitive decline. -Continue Hearing Aids with new audiogram   -Return to clinic 4 months or sooner if new/worsen symptoms present

## 2024-08-26 NOTE — REASON FOR VISIT
[Subsequent Evaluation] : a subsequent evaluation for [FreeTextEntry2] : follow up for SNHL and right ear infection

## 2024-08-26 NOTE — PHYSICAL EXAM
[de-identified] : granulation covering superior cover 70% TM [Normal] : mucosa is normal [Midline] : trachea located in midline position

## 2024-08-26 NOTE — HISTORY OF PRESENT ILLNESS
[de-identified] : 24 M follow up for bilateral SNHL and right ear canal granulation. Patient states has completed dexamethasone drop States hearing has not change No recent ear infections, no otorrhea, no otalgia

## 2024-08-28 DIAGNOSIS — H91.93 UNSPECIFIED HEARING LOSS, BILATERAL: ICD-10-CM

## 2024-09-18 PROCEDURE — 99214 OFFICE O/P EST MOD 30 MIN: CPT

## 2024-10-01 ENCOUNTER — APPOINTMENT (OUTPATIENT)
Dept: OTOLARYNGOLOGY | Facility: CLINIC | Age: 24
End: 2024-10-01
Payer: MEDICAID

## 2024-10-01 ENCOUNTER — LABORATORY RESULT (OUTPATIENT)
Age: 24
End: 2024-10-01

## 2024-10-01 VITALS — WEIGHT: 252 LBS | BODY MASS INDEX: 38.19 KG/M2 | HEIGHT: 68 IN

## 2024-10-01 DIAGNOSIS — H90.3 SENSORINEURAL HEARING LOSS, BILATERAL: ICD-10-CM

## 2024-10-01 DIAGNOSIS — H73.21 UNSPECIFIED MYRINGITIS, RIGHT EAR: ICD-10-CM

## 2024-10-01 PROCEDURE — 99213 OFFICE O/P EST LOW 20 MIN: CPT | Mod: 25

## 2024-10-01 PROCEDURE — 92504 EAR MICROSCOPY EXAMINATION: CPT

## 2024-10-01 RX ORDER — MOMETASONE FUROATE 1 MG/G
0.1 CREAM TOPICAL
Qty: 15 | Refills: 3 | Status: ACTIVE | COMMUNITY
Start: 2024-10-01 | End: 1900-01-01

## 2024-10-01 NOTE — HISTORY OF PRESENT ILLNESS
[de-identified] : 24 year old male referred by Dr. Miranda Hinton presents for initial evaluation of B/L SNHL Hx of pyschosis, on Abilify Wears B/L HAs Persistent right ear granulation treated with drops by Dr. Hinton States when he sweats it irritates his right ear Patient states he has difficulty hearing low volume sounds Denies otalgia, otorrhea, tinnitus, dizziness, vertigo, headaches related to hearing.

## 2024-10-01 NOTE — PHYSICAL EXAM
[Binocular Microscopic Exam] : Binocular microscopic exam was performed [Hearing Shell Test (Tuning Fork On Forehead)] : no lateralization of tone [Hearing Loss Right Only] : normal [Hearing Loss Left Only] : normal [Rinne Test Air Conduction Persists > Bone Conduction Right] : bone conduction greater than air conduction on the right [Rinne Test Air Conduction Persists > Bone Conduction Left] : bone conduction greater than air conduction on the left [Nystagmus] : ~T no ~M nystagmus was seen [Fukuda Step Test] : Fukuda Step Test was Negative [Romberg's Sign] : Romberg's sign was absent [Fistula Sign] : Fistula Sign: Negative [Past-Pointing] : Past-Pointing: Negative [[place]ores-Hallpearlke] : [place]-Hallpike: Negative [de-identified] : imferior half with dry post inflammatory crust [Normal] : no rashes

## 2024-10-01 NOTE — DATA REVIEWED
[de-identified] : I have independently reviewed the patient's audiogram from prior and my findings include matilde SNHL SDS in 60s

## 2024-10-01 NOTE — PROCEDURE
[Other ___] : [unfilled] [Same] : same as the Pre Op Dx. [] : Binocular Microscopy [FreeTextEntry1] : R otorrhea [FreeTextEntry4] : none [FreeTextEntry6] : Operative microscope was used to examine the ear canal, ear drum and visible middle ear landmarks. Adequate exam would not have been possible without the use of a microscope. Findings are described.

## 2024-10-02 ENCOUNTER — APPOINTMENT (OUTPATIENT)
Dept: MRI IMAGING | Facility: HOSPITAL | Age: 24
End: 2024-10-02

## 2024-10-02 ENCOUNTER — RESULT REVIEW (OUTPATIENT)
Age: 24
End: 2024-10-02

## 2024-10-02 ENCOUNTER — OUTPATIENT (OUTPATIENT)
Dept: OUTPATIENT SERVICES | Facility: HOSPITAL | Age: 24
LOS: 1 days | End: 2024-10-02
Payer: SUBSIDIZED

## 2024-10-02 DIAGNOSIS — Z00.6 ENCOUNTER FOR EXAMINATION FOR NORMAL COMPARISON AND CONTROL IN CLINICAL RESEARCH PROGRAM: ICD-10-CM

## 2024-10-02 DIAGNOSIS — Z00.00 ENCOUNTER FOR GENERAL ADULT MEDICAL EXAMINATION WITHOUT ABNORMAL FINDINGS: ICD-10-CM

## 2024-10-02 PROCEDURE — 70551 MRI BRAIN STEM W/O DYE: CPT | Mod: 26

## 2024-10-02 PROCEDURE — 70551 MRI BRAIN STEM W/O DYE: CPT

## 2024-10-07 ENCOUNTER — NON-APPOINTMENT (OUTPATIENT)
Age: 24
End: 2024-10-07

## 2024-10-07 ENCOUNTER — APPOINTMENT (OUTPATIENT)
Dept: GASTROENTEROLOGY | Facility: CLINIC | Age: 24
End: 2024-10-07
Payer: MEDICAID

## 2024-10-07 VITALS
HEART RATE: 100 BPM | SYSTOLIC BLOOD PRESSURE: 120 MMHG | HEIGHT: 68 IN | BODY MASS INDEX: 39.25 KG/M2 | WEIGHT: 259 LBS | OXYGEN SATURATION: 98 % | TEMPERATURE: 96.4 F | DIASTOLIC BLOOD PRESSURE: 87 MMHG | RESPIRATION RATE: 16 BRPM

## 2024-10-07 DIAGNOSIS — R79.89 OTHER SPECIFIED ABNORMAL FINDINGS OF BLOOD CHEMISTRY: ICD-10-CM

## 2024-10-07 DIAGNOSIS — K76.0 FATTY (CHANGE OF) LIVER, NOT ELSEWHERE CLASSIFIED: ICD-10-CM

## 2024-10-07 DIAGNOSIS — Z00.00 ENCOUNTER FOR GENERAL ADULT MEDICAL EXAMINATION W/OUT ABNORMAL FINDINGS: ICD-10-CM

## 2024-10-07 LAB — EAR NOSE AND THROAT CULTURE: NORMAL

## 2024-10-07 PROCEDURE — G2211 COMPLEX E/M VISIT ADD ON: CPT | Mod: NC

## 2024-10-07 PROCEDURE — 99205 OFFICE O/P NEW HI 60 MIN: CPT

## 2024-10-09 LAB
EBV EA AB SER IA-ACNC: <5 U/ML
EBV EA AB TITR SER IF: POSITIVE
EBV EA IGG SER QL IA: >600 U/ML
EBV EA IGG SER-ACNC: NEGATIVE
EBV EA IGM SER IA-ACNC: NEGATIVE
EBV PATRN SPEC IB-IMP: NORMAL
EBV VCA IGG SER IA-ACNC: 510 U/ML
EBV VCA IGM SER QL IA: <10 U/ML
EPSTEIN-BARR VIRUS CAPSID ANTIGEN IGG: POSITIVE

## 2024-10-10 LAB
ALBUMIN SERPL ELPH-MCNC: 4.8 G/DL
ALP BLD-CCNC: 114 U/L
ALT SERPL-CCNC: 223 U/L
ANION GAP SERPL CALC-SCNC: 14 MMOL/L
AST SERPL-CCNC: 97 U/L
BILIRUB SERPL-MCNC: 0.3 MG/DL
BUN SERPL-MCNC: 15 MG/DL
CALCIUM SERPL-MCNC: 10.2 MG/DL
CERULOPLASMIN SERPL-MCNC: 26 MG/DL
CHLORIDE SERPL-SCNC: 103 MMOL/L
CK SERPL-CCNC: 250 U/L
CO2 SERPL-SCNC: 24 MMOL/L
CREAT SERPL-MCNC: 0.98 MG/DL
DEPRECATED KAPPA LC FREE/LAMBDA SER: 1.4 RATIO
EGFR: 109 ML/MIN/1.73M2
FERRITIN SERPL-MCNC: 381 NG/ML
GLUCOSE SERPL-MCNC: 87 MG/DL
HCT VFR BLD CALC: 52.4 %
HGB BLD-MCNC: 16.9 G/DL
HSV 1 IGG TYPE-SPECIFIC AB: <0.9 INDEX
HSV 2 IGG TYPE-SPECIFIC AB: <0.9 INDEX
IGA SER QL IEP: 289 MG/DL
IGA SER QL IEP: 289 MG/DL
IGG SER QL IEP: 1593 MG/DL
IGM SER QL IEP: 79 MG/DL
INR PPP: 0.95 RATIO
IRON SATN MFR SERPL: 31 %
IRON SERPL-MCNC: 118 UG/DL
KAPPA LC CSF-MCNC: 1.39 MG/DL
KAPPA LC SERPL-MCNC: 1.94 MG/DL
MCHC RBC-ENTMCNC: 28.4 PG
MCHC RBC-ENTMCNC: 32.3 GM/DL
MCV RBC AUTO: 87.9 FL
PLATELET # BLD AUTO: 339 K/UL
POTASSIUM SERPL-SCNC: 4.6 MMOL/L
PROT SERPL-MCNC: 8.3 G/DL
PT BLD: 11.2 SEC
RBC # BLD: 5.96 M/UL
RBC # FLD: 16.6 %
SODIUM SERPL-SCNC: 141 MMOL/L
TIBC SERPL-MCNC: 385 UG/DL
TTG IGG SER IA-ACNC: <0.8 U/ML
TTG IGG SER IA-ACNC: NEGATIVE
UIBC SERPL-MCNC: 268 UG/DL
WBC # FLD AUTO: 6.67 K/UL

## 2024-10-14 ENCOUNTER — APPOINTMENT (OUTPATIENT)
Dept: OTOLARYNGOLOGY | Facility: CLINIC | Age: 24
End: 2024-10-14
Payer: MEDICAID

## 2024-10-14 VITALS
HEIGHT: 68 IN | DIASTOLIC BLOOD PRESSURE: 80 MMHG | SYSTOLIC BLOOD PRESSURE: 120 MMHG | BODY MASS INDEX: 39.1 KG/M2 | WEIGHT: 258 LBS | HEART RATE: 91 BPM

## 2024-10-14 DIAGNOSIS — H93.293 OTHER ABNORMAL AUDITORY PERCEPTIONS, BILATERAL: ICD-10-CM

## 2024-10-14 DIAGNOSIS — H90.3 SENSORINEURAL HEARING LOSS, BILATERAL: ICD-10-CM

## 2024-10-14 LAB
A1AT PHENOTYP SERPL-IMP: NORMAL
A1AT SERPL-MCNC: 129 MG/DL
TM INTERPRETATION: NORMAL

## 2024-10-14 PROCEDURE — 99213 OFFICE O/P EST LOW 20 MIN: CPT | Mod: 25

## 2024-10-14 PROCEDURE — G0268 REMOVAL OF IMPACTED WAX MD: CPT

## 2024-10-21 ENCOUNTER — APPOINTMENT (OUTPATIENT)
Dept: GASTROENTEROLOGY | Facility: CLINIC | Age: 24
End: 2024-10-21
Payer: MEDICAID

## 2024-10-21 VITALS
RESPIRATION RATE: 16 BRPM | DIASTOLIC BLOOD PRESSURE: 80 MMHG | OXYGEN SATURATION: 97 % | WEIGHT: 259 LBS | HEIGHT: 68 IN | HEART RATE: 104 BPM | TEMPERATURE: 95.4 F | BODY MASS INDEX: 39.25 KG/M2 | SYSTOLIC BLOOD PRESSURE: 125 MMHG

## 2024-10-21 DIAGNOSIS — E78.00 PURE HYPERCHOLESTEROLEMIA, UNSPECIFIED: ICD-10-CM

## 2024-10-21 DIAGNOSIS — E66.812 OBESITY, CLASS 2: ICD-10-CM

## 2024-10-21 PROCEDURE — 97802 MEDICAL NUTRITION INDIV IN: CPT

## 2024-10-22 ENCOUNTER — APPOINTMENT (OUTPATIENT)
Dept: OTOLARYNGOLOGY | Facility: CLINIC | Age: 24
End: 2024-10-22
Payer: MEDICAID

## 2024-10-22 VITALS — WEIGHT: 259 LBS | HEIGHT: 68 IN | BODY MASS INDEX: 39.25 KG/M2

## 2024-10-22 DIAGNOSIS — H90.3 SENSORINEURAL HEARING LOSS, BILATERAL: ICD-10-CM

## 2024-10-22 DIAGNOSIS — H73.21 UNSPECIFIED MYRINGITIS, RIGHT EAR: ICD-10-CM

## 2024-10-22 PROBLEM — E66.812 OBESITY, CLASS II, BMI 35-39.9: Status: ACTIVE | Noted: 2024-10-22

## 2024-10-22 PROBLEM — E78.00 HYPERCHOLESTEROLEMIA: Status: ACTIVE | Noted: 2024-10-22

## 2024-10-22 PROCEDURE — 99213 OFFICE O/P EST LOW 20 MIN: CPT | Mod: 25

## 2024-10-22 PROCEDURE — 92504 EAR MICROSCOPY EXAMINATION: CPT

## 2024-10-22 NOTE — ED PROVIDER NOTE - ENMT, MLM
[facility]  Lisa Calhoun M.D.  [address] , [address]   [place]  Office : [phone], Fax: [phone]     RHEUMATOLOGY OFFICE VISIT NOTE  Date of Visit:  10/22/2024 4:25 PM    Patient Information:  Name:  Tiffanie Tejeda  :  1992  Age:  32 y.o.   Gender:  female      Ms. Tejeda is here today for follow-up of lupus, raynaud's, fibromyalgia and medication monitoring.     Last visit: 24    History of Present Illness: On talking to the patient today she states that she is seeing pain management for her fibromyalgia.  She has also been going for aquatic therapy twice a week which is helping with her fibromyalgia symptoms.  On talking to her further she states that she took the Plaquenil for a few months and she did not see any change so she stopped taking it.  Patient had been told at several visits by me that she has lupus for which she needs to be on the Plaquenil but the fibromyalgia seems to be causing her flares for which reason she was instructed to see pain management.  That was reiterated to the patient again today.  Patient's current joint complaints are as mentioned below.    Since the last visit, patient is feeling \"fair\".    Pain: 4/10  Location:  Some arm and leg pain with no UE and LE weakness. Some neck and lower back pain. Bilateral shoulder and para spinal muscle pain. Some para spinal muscle pain. Bilateral wrist pain with no swelling, warmth and redness. Bilateral knee pain with no swelling, warmth and redness with no buckling. Bilateral ankle pain and swelling with no buckling with no warmth and redness.   Quality:  Deep achy to sharp to throbbing pain.   Modifying Factors:  1st thing in the morning the stiffness is the worst and the pain is the worst at the end of the day.   Associated Symptoms:  No limitations with her ADL's with constant tingling and numbness of her hands and intermittent tingling and numbness of her 
Airway patent, Nasal mucosa clear. Mouth with normal mucosa. Throat has no vesicles, no oropharyngeal exudates and uvula is midline.

## 2024-10-23 PROCEDURE — 99214 OFFICE O/P EST MOD 30 MIN: CPT

## 2024-10-28 ENCOUNTER — APPOINTMENT (OUTPATIENT)
Dept: HEPATOLOGY | Facility: CLINIC | Age: 24
End: 2024-10-28
Payer: MEDICAID

## 2024-10-28 VITALS
HEIGHT: 68 IN | WEIGHT: 259 LBS | BODY MASS INDEX: 39.25 KG/M2 | RESPIRATION RATE: 16 BRPM | HEART RATE: 84 BPM | TEMPERATURE: 96.4 F | OXYGEN SATURATION: 97 % | DIASTOLIC BLOOD PRESSURE: 84 MMHG | SYSTOLIC BLOOD PRESSURE: 127 MMHG

## 2024-10-28 DIAGNOSIS — K76.0 FATTY (CHANGE OF) LIVER, NOT ELSEWHERE CLASSIFIED: ICD-10-CM

## 2024-10-28 DIAGNOSIS — R79.89 OTHER SPECIFIED ABNORMAL FINDINGS OF BLOOD CHEMISTRY: ICD-10-CM

## 2024-10-28 DIAGNOSIS — K74.00 HEPATIC FIBROSIS, UNSPECIFIED: ICD-10-CM

## 2024-10-28 PROCEDURE — 99205 OFFICE O/P NEW HI 60 MIN: CPT

## 2024-10-28 PROCEDURE — 76981 USE PARENCHYMA: CPT

## 2024-10-29 LAB
ALBUMIN MFR SERPL ELPH: 58 %
ALBUMIN SERPL ELPH-MCNC: 5 G/DL
ALBUMIN SERPL-MCNC: 5 G/DL
ALBUMIN/GLOB SERPL: 1.4 RATIO
ALP BLD-CCNC: 96 U/L
ALPHA1 GLOB MFR SERPL ELPH: 3 %
ALPHA1 GLOB SERPL ELPH-MCNC: 0.3 G/DL
ALPHA2 GLOB MFR SERPL ELPH: 9 %
ALPHA2 GLOB SERPL ELPH-MCNC: 0.8 G/DL
ALT SERPL-CCNC: 140 U/L
AST SERPL-CCNC: 56 U/L
B-GLOBULIN MFR SERPL ELPH: 12.6 %
B-GLOBULIN SERPL ELPH-MCNC: 1.1 G/DL
BILIRUB DIRECT SERPL-MCNC: 0.1 MG/DL
BILIRUB INDIRECT SERPL-MCNC: 0.2 MG/DL
BILIRUB SERPL-MCNC: 0.3 MG/DL
GAMMA GLOB FLD ELPH-MCNC: 1.5 G/DL
GAMMA GLOB MFR SERPL ELPH: 17.4 %
HBV CORE IGG+IGM SER QL: NONREACTIVE
HBV SURFACE AB SERPL IA-ACNC: <3 MIU/ML
HEPATITIS A IGG ANTIBODY: REACTIVE
INTERPRETATION SERPL IEP-IMP: NORMAL
PROT SERPL-MCNC: 8.6 G/DL

## 2024-11-01 ENCOUNTER — NON-APPOINTMENT (OUTPATIENT)
Age: 24
End: 2024-11-01

## 2024-11-04 ENCOUNTER — APPOINTMENT (OUTPATIENT)
Dept: GASTROENTEROLOGY | Facility: CLINIC | Age: 24
End: 2024-11-04

## 2024-11-14 ENCOUNTER — APPOINTMENT (OUTPATIENT)
Dept: GASTROENTEROLOGY | Facility: CLINIC | Age: 24
End: 2024-11-14
Payer: MEDICAID

## 2024-11-14 ENCOUNTER — APPOINTMENT (OUTPATIENT)
Dept: OPHTHALMOLOGY | Facility: CLINIC | Age: 24
End: 2024-11-14
Payer: MEDICAID

## 2024-11-14 ENCOUNTER — NON-APPOINTMENT (OUTPATIENT)
Age: 24
End: 2024-11-14

## 2024-11-14 VITALS
HEART RATE: 70 BPM | WEIGHT: 252 LBS | DIASTOLIC BLOOD PRESSURE: 80 MMHG | SYSTOLIC BLOOD PRESSURE: 126 MMHG | RESPIRATION RATE: 16 BRPM | HEIGHT: 68 IN | TEMPERATURE: 98.4 F | BODY MASS INDEX: 38.19 KG/M2 | OXYGEN SATURATION: 99 %

## 2024-11-14 DIAGNOSIS — E66.812 OBESITY, CLASS 2: ICD-10-CM

## 2024-11-14 DIAGNOSIS — R79.89 OTHER SPECIFIED ABNORMAL FINDINGS OF BLOOD CHEMISTRY: ICD-10-CM

## 2024-11-14 DIAGNOSIS — K74.00 HEPATIC FIBROSIS, UNSPECIFIED: ICD-10-CM

## 2024-11-14 DIAGNOSIS — H73.21: ICD-10-CM

## 2024-11-14 PROCEDURE — 99204 OFFICE O/P NEW MOD 45 MIN: CPT

## 2024-11-14 PROCEDURE — 97803 MED NUTRITION INDIV SUBSEQ: CPT

## 2024-11-24 NOTE — PATIENT PROFILE ADULT. - VISION (WITH CORRECTIVE LENSES IF THE PATIENT USUALLY WEARS THEM):
wears eyeglasses/Normal vision: sees adequately in most situations; can see medication labels, newsprint Ambulatory

## 2024-11-27 ENCOUNTER — APPOINTMENT (OUTPATIENT)
Dept: HEPATOLOGY | Facility: CLINIC | Age: 24
End: 2024-11-27

## 2024-11-27 VITALS
HEART RATE: 90 BPM | HEIGHT: 68 IN | OXYGEN SATURATION: 95 % | SYSTOLIC BLOOD PRESSURE: 118 MMHG | TEMPERATURE: 97.5 F | RESPIRATION RATE: 16 BRPM | WEIGHT: 249 LBS | BODY MASS INDEX: 37.74 KG/M2 | DIASTOLIC BLOOD PRESSURE: 82 MMHG

## 2024-11-27 PROCEDURE — G2211 COMPLEX E/M VISIT ADD ON: CPT | Mod: NC

## 2024-11-27 PROCEDURE — 99214 OFFICE O/P EST MOD 30 MIN: CPT

## 2024-11-29 ENCOUNTER — NON-APPOINTMENT (OUTPATIENT)
Age: 24
End: 2024-11-29

## 2024-11-29 LAB
ALBUMIN SERPL ELPH-MCNC: 4.9 G/DL
ALP BLD-CCNC: 94 U/L
ALT SERPL-CCNC: 114 U/L
AST SERPL-CCNC: 56 U/L
BILIRUB DIRECT SERPL-MCNC: 0.1 MG/DL
BILIRUB INDIRECT SERPL-MCNC: 0.4 MG/DL
BILIRUB SERPL-MCNC: 0.5 MG/DL
PROT SERPL-MCNC: 8.4 G/DL

## 2024-12-03 ENCOUNTER — APPOINTMENT (OUTPATIENT)
Dept: OPHTHALMOLOGY | Facility: CLINIC | Age: 24
End: 2024-12-03
Payer: MEDICAID

## 2024-12-03 ENCOUNTER — NON-APPOINTMENT (OUTPATIENT)
Age: 24
End: 2024-12-03

## 2024-12-03 PROCEDURE — 92015 DETERMINE REFRACTIVE STATE: CPT | Mod: NC

## 2024-12-03 PROCEDURE — 92004 COMPRE OPH EXAM NEW PT 1/>: CPT | Mod: 25

## 2024-12-05 ENCOUNTER — APPOINTMENT (OUTPATIENT)
Dept: GASTROENTEROLOGY | Facility: CLINIC | Age: 24
End: 2024-12-05

## 2024-12-09 ENCOUNTER — APPOINTMENT (OUTPATIENT)
Dept: GASTROENTEROLOGY | Facility: CLINIC | Age: 24
End: 2024-12-09
Payer: MEDICAID

## 2024-12-09 VITALS
BODY MASS INDEX: 38.04 KG/M2 | OXYGEN SATURATION: 96 % | TEMPERATURE: 97 F | HEIGHT: 68 IN | WEIGHT: 251 LBS | RESPIRATION RATE: 16 BRPM | SYSTOLIC BLOOD PRESSURE: 100 MMHG | HEART RATE: 95 BPM | DIASTOLIC BLOOD PRESSURE: 65 MMHG

## 2024-12-09 DIAGNOSIS — K74.00 HEPATIC FIBROSIS, UNSPECIFIED: ICD-10-CM

## 2024-12-09 DIAGNOSIS — K76.0 FATTY (CHANGE OF) LIVER, NOT ELSEWHERE CLASSIFIED: ICD-10-CM

## 2024-12-09 DIAGNOSIS — E66.812 OBESITY, CLASS 2: ICD-10-CM

## 2024-12-09 PROCEDURE — 97803 MED NUTRITION INDIV SUBSEQ: CPT

## 2025-01-06 ENCOUNTER — APPOINTMENT (OUTPATIENT)
Dept: GASTROENTEROLOGY | Facility: CLINIC | Age: 25
End: 2025-01-06

## 2025-01-13 ENCOUNTER — NON-APPOINTMENT (OUTPATIENT)
Age: 25
End: 2025-01-13

## 2025-01-13 ENCOUNTER — APPOINTMENT (OUTPATIENT)
Dept: GASTROENTEROLOGY | Facility: CLINIC | Age: 25
End: 2025-01-13

## 2025-01-13 VITALS
WEIGHT: 251.8 LBS | HEART RATE: 80 BPM | BODY MASS INDEX: 38.16 KG/M2 | HEIGHT: 68 IN | TEMPERATURE: 207.14 F | RESPIRATION RATE: 16 BRPM | DIASTOLIC BLOOD PRESSURE: 70 MMHG | OXYGEN SATURATION: 99 % | SYSTOLIC BLOOD PRESSURE: 112 MMHG

## 2025-01-13 DIAGNOSIS — E78.00 PURE HYPERCHOLESTEROLEMIA, UNSPECIFIED: ICD-10-CM

## 2025-01-13 PROCEDURE — 97803 MED NUTRITION INDIV SUBSEQ: CPT

## 2025-01-15 ENCOUNTER — APPOINTMENT (OUTPATIENT)
Dept: GASTROENTEROLOGY | Facility: CLINIC | Age: 25
End: 2025-01-15
Payer: MEDICAID

## 2025-01-15 VITALS
RESPIRATION RATE: 18 BRPM | OXYGEN SATURATION: 100 % | WEIGHT: 251 LBS | DIASTOLIC BLOOD PRESSURE: 94 MMHG | TEMPERATURE: 208.4 F | SYSTOLIC BLOOD PRESSURE: 154 MMHG | BODY MASS INDEX: 38.04 KG/M2 | HEIGHT: 68 IN | HEART RATE: 103 BPM

## 2025-01-15 DIAGNOSIS — E66.812 OBESITY, CLASS 2: ICD-10-CM

## 2025-01-15 DIAGNOSIS — K76.0 FATTY (CHANGE OF) LIVER, NOT ELSEWHERE CLASSIFIED: ICD-10-CM

## 2025-01-15 DIAGNOSIS — R79.89 OTHER SPECIFIED ABNORMAL FINDINGS OF BLOOD CHEMISTRY: ICD-10-CM

## 2025-01-15 PROCEDURE — 99215 OFFICE O/P EST HI 40 MIN: CPT

## 2025-01-15 RX ORDER — ATORVASTATIN CALCIUM 10 MG/1
10 TABLET, FILM COATED ORAL
Refills: 0 | Status: ACTIVE | COMMUNITY

## 2025-01-19 ENCOUNTER — EMERGENCY (EMERGENCY)
Facility: HOSPITAL | Age: 25
LOS: 1 days | Discharge: HOME | End: 2025-01-19
Attending: STUDENT IN AN ORGANIZED HEALTH CARE EDUCATION/TRAINING PROGRAM
Payer: MEDICAID

## 2025-01-19 VITALS
TEMPERATURE: 102 F | HEART RATE: 151 BPM | RESPIRATION RATE: 20 BRPM | WEIGHT: 246.92 LBS | HEIGHT: 68 IN | SYSTOLIC BLOOD PRESSURE: 124 MMHG | OXYGEN SATURATION: 97 % | DIASTOLIC BLOOD PRESSURE: 70 MMHG

## 2025-01-19 VITALS
DIASTOLIC BLOOD PRESSURE: 78 MMHG | RESPIRATION RATE: 20 BRPM | SYSTOLIC BLOOD PRESSURE: 120 MMHG | OXYGEN SATURATION: 98 %

## 2025-01-19 LAB
ALBUMIN SERPL ELPH-MCNC: 4.2 G/DL — SIGNIFICANT CHANGE UP (ref 3.5–5)
ALP SERPL-CCNC: 94 U/L — SIGNIFICANT CHANGE UP (ref 40–120)
ALT FLD-CCNC: 94 U/L DA — HIGH (ref 10–60)
ANION GAP SERPL CALC-SCNC: 11 MMOL/L — SIGNIFICANT CHANGE UP (ref 5–17)
APPEARANCE UR: CLEAR — SIGNIFICANT CHANGE UP
APTT BLD: 33.7 SEC — SIGNIFICANT CHANGE UP (ref 24.5–35.6)
AST SERPL-CCNC: 41 U/L — HIGH (ref 10–40)
BACTERIA # UR AUTO: ABNORMAL /HPF
BASOPHILS # BLD AUTO: 0.06 K/UL — SIGNIFICANT CHANGE UP (ref 0–0.2)
BASOPHILS NFR BLD AUTO: 0.5 % — SIGNIFICANT CHANGE UP (ref 0–2)
BILIRUB SERPL-MCNC: 0.3 MG/DL — SIGNIFICANT CHANGE UP (ref 0.2–1.2)
BILIRUB UR-MCNC: NEGATIVE — SIGNIFICANT CHANGE UP
BUN SERPL-MCNC: 17 MG/DL — SIGNIFICANT CHANGE UP (ref 7–18)
CALCIUM SERPL-MCNC: 9.6 MG/DL — SIGNIFICANT CHANGE UP (ref 8.4–10.5)
CHLORIDE SERPL-SCNC: 104 MMOL/L — SIGNIFICANT CHANGE UP (ref 96–108)
CO2 SERPL-SCNC: 22 MMOL/L — SIGNIFICANT CHANGE UP (ref 22–31)
COLOR SPEC: SIGNIFICANT CHANGE UP
COMMENT - URINE: SIGNIFICANT CHANGE UP
CREAT SERPL-MCNC: 1.31 MG/DL — HIGH (ref 0.5–1.3)
DIFF PNL FLD: NEGATIVE — SIGNIFICANT CHANGE UP
EGFR: 77 ML/MIN/1.73M2 — SIGNIFICANT CHANGE UP
EOSINOPHIL # BLD AUTO: 0.04 K/UL — SIGNIFICANT CHANGE UP (ref 0–0.5)
EOSINOPHIL NFR BLD AUTO: 0.3 % — SIGNIFICANT CHANGE UP (ref 0–6)
EPI CELLS # UR: PRESENT
FLUAV AG NPH QL: DETECTED
FLUBV AG NPH QL: SIGNIFICANT CHANGE UP
GLUCOSE SERPL-MCNC: 120 MG/DL — HIGH (ref 70–99)
GLUCOSE UR QL: NEGATIVE MG/DL — SIGNIFICANT CHANGE UP
HCT VFR BLD CALC: 47.7 % — SIGNIFICANT CHANGE UP (ref 39–50)
HGB BLD-MCNC: 15.6 G/DL — SIGNIFICANT CHANGE UP (ref 13–17)
IMM GRANULOCYTES NFR BLD AUTO: 0.5 % — SIGNIFICANT CHANGE UP (ref 0–0.9)
INR BLD: 1.17 RATIO — HIGH (ref 0.85–1.16)
KETONES UR-MCNC: ABNORMAL MG/DL
LACTATE SERPL-SCNC: 2 MMOL/L — SIGNIFICANT CHANGE UP (ref 0.7–2)
LEUKOCYTE ESTERASE UR-ACNC: NEGATIVE — SIGNIFICANT CHANGE UP
LYMPHOCYTES # BLD AUTO: 0.92 K/UL — LOW (ref 1–3.3)
LYMPHOCYTES # BLD AUTO: 7.2 % — LOW (ref 13–44)
MCHC RBC-ENTMCNC: 27.6 PG — SIGNIFICANT CHANGE UP (ref 27–34)
MCHC RBC-ENTMCNC: 32.7 G/DL — SIGNIFICANT CHANGE UP (ref 32–36)
MCV RBC AUTO: 84.4 FL — SIGNIFICANT CHANGE UP (ref 80–100)
MONOCYTES # BLD AUTO: 0.55 K/UL — SIGNIFICANT CHANGE UP (ref 0–0.9)
MONOCYTES NFR BLD AUTO: 4.3 % — SIGNIFICANT CHANGE UP (ref 2–14)
NEUTROPHILS # BLD AUTO: 11.15 K/UL — HIGH (ref 1.8–7.4)
NEUTROPHILS NFR BLD AUTO: 87.2 % — HIGH (ref 43–77)
NITRITE UR-MCNC: NEGATIVE — SIGNIFICANT CHANGE UP
NRBC # BLD: 0 /100 WBCS — SIGNIFICANT CHANGE UP (ref 0–0)
PH UR: 5.5 — SIGNIFICANT CHANGE UP (ref 5–8)
PLATELET # BLD AUTO: 382 K/UL — SIGNIFICANT CHANGE UP (ref 150–400)
POTASSIUM SERPL-MCNC: 4 MMOL/L — SIGNIFICANT CHANGE UP (ref 3.5–5.3)
POTASSIUM SERPL-SCNC: 4 MMOL/L — SIGNIFICANT CHANGE UP (ref 3.5–5.3)
PROT SERPL-MCNC: 9.1 G/DL — HIGH (ref 6–8.3)
PROT UR-MCNC: 30 MG/DL
PROTHROM AB SERPL-ACNC: 13.5 SEC — HIGH (ref 9.9–13.4)
RBC # BLD: 5.65 M/UL — SIGNIFICANT CHANGE UP (ref 4.2–5.8)
RBC # FLD: 13.7 % — SIGNIFICANT CHANGE UP (ref 10.3–14.5)
RBC CASTS # UR COMP ASSIST: 1 /HPF — SIGNIFICANT CHANGE UP (ref 0–4)
RSV RNA NPH QL NAA+NON-PROBE: SIGNIFICANT CHANGE UP
SARS-COV-2 RNA SPEC QL NAA+PROBE: SIGNIFICANT CHANGE UP
SODIUM SERPL-SCNC: 137 MMOL/L — SIGNIFICANT CHANGE UP (ref 135–145)
SP GR SPEC: 1.04 — HIGH (ref 1–1.03)
UROBILINOGEN FLD QL: 1 MG/DL — SIGNIFICANT CHANGE UP (ref 0.2–1)
WBC # BLD: 12.78 K/UL — HIGH (ref 3.8–10.5)
WBC # FLD AUTO: 12.78 K/UL — HIGH (ref 3.8–10.5)
WBC UR QL: 1 /HPF — SIGNIFICANT CHANGE UP (ref 0–5)

## 2025-01-19 PROCEDURE — 83605 ASSAY OF LACTIC ACID: CPT

## 2025-01-19 PROCEDURE — 87389 HIV-1 AG W/HIV-1&-2 AB AG IA: CPT

## 2025-01-19 PROCEDURE — 81001 URINALYSIS AUTO W/SCOPE: CPT

## 2025-01-19 PROCEDURE — 87040 BLOOD CULTURE FOR BACTERIA: CPT

## 2025-01-19 PROCEDURE — 85610 PROTHROMBIN TIME: CPT

## 2025-01-19 PROCEDURE — 71046 X-RAY EXAM CHEST 2 VIEWS: CPT

## 2025-01-19 PROCEDURE — 99285 EMERGENCY DEPT VISIT HI MDM: CPT

## 2025-01-19 PROCEDURE — 96365 THER/PROPH/DIAG IV INF INIT: CPT

## 2025-01-19 PROCEDURE — 96375 TX/PRO/DX INJ NEW DRUG ADDON: CPT

## 2025-01-19 PROCEDURE — 71046 X-RAY EXAM CHEST 2 VIEWS: CPT | Mod: 26

## 2025-01-19 PROCEDURE — 85025 COMPLETE CBC W/AUTO DIFF WBC: CPT

## 2025-01-19 PROCEDURE — 36415 COLL VENOUS BLD VENIPUNCTURE: CPT

## 2025-01-19 PROCEDURE — 80053 COMPREHEN METABOLIC PANEL: CPT

## 2025-01-19 PROCEDURE — 85730 THROMBOPLASTIN TIME PARTIAL: CPT

## 2025-01-19 PROCEDURE — 99284 EMERGENCY DEPT VISIT MOD MDM: CPT | Mod: 25

## 2025-01-19 PROCEDURE — 87637 SARSCOV2&INF A&B&RSV AMP PRB: CPT

## 2025-01-19 RX ORDER — ACETAMINOPHEN 80 MG/.8ML
1000 SOLUTION/ DROPS ORAL ONCE
Refills: 0 | Status: COMPLETED | OUTPATIENT
Start: 2025-01-19 | End: 2025-01-19

## 2025-01-19 RX ORDER — SODIUM CHLORIDE 9 MG/ML
2100 INJECTION, SOLUTION INTRAMUSCULAR; INTRAVENOUS; SUBCUTANEOUS ONCE
Refills: 0 | Status: COMPLETED | OUTPATIENT
Start: 2025-01-19 | End: 2025-01-19

## 2025-01-19 RX ORDER — KETOROLAC TROMETHAMINE 30 MG/ML
15 INJECTION INTRAMUSCULAR; INTRAVENOUS ONCE
Refills: 0 | Status: DISCONTINUED | OUTPATIENT
Start: 2025-01-19 | End: 2025-01-19

## 2025-01-19 RX ORDER — ONDANSETRON 4 MG/1
4 TABLET ORAL ONCE
Refills: 0 | Status: COMPLETED | OUTPATIENT
Start: 2025-01-19 | End: 2025-01-19

## 2025-01-19 RX ADMIN — KETOROLAC TROMETHAMINE 15 MILLIGRAM(S): 30 INJECTION INTRAMUSCULAR; INTRAVENOUS at 11:40

## 2025-01-19 RX ADMIN — ONDANSETRON 4 MILLIGRAM(S): 4 TABLET ORAL at 11:02

## 2025-01-19 RX ADMIN — KETOROLAC TROMETHAMINE 15 MILLIGRAM(S): 30 INJECTION INTRAMUSCULAR; INTRAVENOUS at 11:01

## 2025-01-19 RX ADMIN — ACETAMINOPHEN 400 MILLIGRAM(S): 80 SOLUTION/ DROPS ORAL at 11:01

## 2025-01-19 RX ADMIN — SODIUM CHLORIDE 2100 MILLILITER(S): 9 INJECTION, SOLUTION INTRAMUSCULAR; INTRAVENOUS; SUBCUTANEOUS at 11:02

## 2025-01-19 RX ADMIN — ACETAMINOPHEN 1000 MILLIGRAM(S): 80 SOLUTION/ DROPS ORAL at 11:40

## 2025-01-19 RX ADMIN — ACETAMINOPHEN 1000 MILLIGRAM(S): 80 SOLUTION/ DROPS ORAL at 11:28

## 2025-01-19 NOTE — ED PROVIDER NOTE - CLINICAL SUMMARY MEDICAL DECISION MAKING FREE TEXT BOX
24-year-old male presenting with fever, cough, nausea and vomiting.  Patient febrile and tachycardic on ED arrival so code sepsis called.  Will not give antibiotics at this time as symptoms are likely secondary to a viral illness.

## 2025-01-19 NOTE — ED PROVIDER NOTE - OBJECTIVE STATEMENT
24-year-old male, PMH of hearing loss, schizophrenia, presenting with fever.  Patient reports since the new year he has had high fevers, cough, some nausea.  No abdominal pain or trouble breathing.  Sister at bedside reports that last night he had fevers to 106.  Patient also feels as if the fevers are making his schizophrenia symptoms worse.

## 2025-01-19 NOTE — ED ADULT NURSE NOTE - OBJECTIVE STATEMENT
patient reports feeling sick with flu like symptoms x 3 weeks with nausea/ vomitting , body chills, patient was treated by PCP with Clara/SERGEI , SO at bedside, patient attached to cardiac monitoring, Tachycardic. denies pain . pt reports phlegm

## 2025-01-19 NOTE — ED PROVIDER NOTE - PATIENT PORTAL LINK FT
You can access the FollowMyHealth Patient Portal offered by [facility] by registering at the following website: [email or web address]. By joining [employer]’s FollowMyHealth portal, you will also be able to view your health information using other applications (apps) compatible with our system.

## 2025-01-19 NOTE — ED ADULT NURSE NOTE - NS ED NOTE ABUSE SUSPICION NEGLECT YN
Per pcp re: cxr \"pneumonia noted.  Please inform pt, family.  If want to stay there, call tomorrow for followup.\"    Pt's daughter is advised of pneumonia dx and tx.  She will call the office tomorrow to update.    Message faxed to [phone].  
Priscila is faxing results of cxr indicating bilateral airspace disease.  She says she spoke with pcp this morning and pt was started on tx, not indicated on mar.  She confirmed nkda, office fax provided.  
No

## 2025-01-19 NOTE — ED PROVIDER NOTE - NSFOLLOWUPINSTRUCTIONS_ED_ALL_ED_FT
You were seen in the emergency department for fever and cough and were found to have Influenza.     Please follow-up with your primary care doctor within the next 24-48 hours.    Please take Ibuprofen and Tylenol as prescribed on the bottles for symptom control.     If you have any worsening symptoms, severe headache, chest pain, trouble breathing, please return to the emergency department.

## 2025-01-19 NOTE — ED ADULT NURSE NOTE - NS PRO PASSIVE SMOKE EXP
[NS_DeliveryAttending1_OBGYN_ALL_OB_FT:[email or web address]=],[NS_DeliveryAssist1_OBGYN_ALL_OB_FT:MjMwNzgzMDExOTA=],[NS_DeliveryRN_OBGYN_ALL_OB_FT:MjEzMjkyMDExOTA=] [NS_DeliveryAttending1_OBGYN_ALL_OB_FT:MTcwMzgzMDExOTA=],[NS_DeliveryAssist1_OBGYN_ALL_OB_FT:MjMwNzgzMDExOTA=],[NS_DeliveryRN_OBGYN_ALL_OB_FT:MjEzMjkyMDExOTA=],[NS_CirculateRN2_OBGYN_ALL_OB_FT:MjEyOTkwMDExOTA=] Unknown

## 2025-01-20 LAB — HIV 1+2 AB+HIV1 P24 AG SERPL QL IA: SIGNIFICANT CHANGE UP

## 2025-01-22 ENCOUNTER — APPOINTMENT (OUTPATIENT)
Dept: ENDOCRINOLOGY | Facility: CLINIC | Age: 25
End: 2025-01-22

## 2025-02-04 ENCOUNTER — APPOINTMENT (OUTPATIENT)
Dept: OTOLARYNGOLOGY | Facility: CLINIC | Age: 25
End: 2025-02-04
Payer: MEDICAID

## 2025-02-04 VITALS
BODY MASS INDEX: 37.44 KG/M2 | HEART RATE: 99 BPM | HEIGHT: 68 IN | WEIGHT: 247 LBS | SYSTOLIC BLOOD PRESSURE: 113 MMHG | DIASTOLIC BLOOD PRESSURE: 84 MMHG

## 2025-02-04 DIAGNOSIS — H73.21 UNSPECIFIED MYRINGITIS, RIGHT EAR: ICD-10-CM

## 2025-02-04 DIAGNOSIS — H90.3 SENSORINEURAL HEARING LOSS, BILATERAL: ICD-10-CM

## 2025-02-04 PROCEDURE — 99214 OFFICE O/P EST MOD 30 MIN: CPT | Mod: 25

## 2025-02-04 PROCEDURE — 92504 EAR MICROSCOPY EXAMINATION: CPT

## 2025-02-04 RX ORDER — CIPROFLOXACIN AND DEXAMETHASONE 3; 1 MG/ML; MG/ML
0.3-0.1 SUSPENSION/ DROPS AURICULAR (OTIC)
Qty: 1 | Refills: 0 | Status: ACTIVE | COMMUNITY
Start: 2025-02-04 | End: 1900-01-01

## 2025-02-10 ENCOUNTER — APPOINTMENT (OUTPATIENT)
Dept: GASTROENTEROLOGY | Facility: CLINIC | Age: 25
End: 2025-02-10
Payer: MEDICAID

## 2025-02-10 ENCOUNTER — APPOINTMENT (OUTPATIENT)
Dept: GASTROENTEROLOGY | Facility: CLINIC | Age: 25
End: 2025-02-10

## 2025-02-10 VITALS
OXYGEN SATURATION: 98 % | HEART RATE: 113 BPM | SYSTOLIC BLOOD PRESSURE: 120 MMHG | BODY MASS INDEX: 37.44 KG/M2 | TEMPERATURE: 97.3 F | WEIGHT: 247 LBS | DIASTOLIC BLOOD PRESSURE: 84 MMHG | HEIGHT: 68 IN

## 2025-02-10 DIAGNOSIS — K74.00 HEPATIC FIBROSIS, UNSPECIFIED: ICD-10-CM

## 2025-02-10 DIAGNOSIS — E66.812 OBESITY, CLASS 2: ICD-10-CM

## 2025-02-10 DIAGNOSIS — K76.0 FATTY (CHANGE OF) LIVER, NOT ELSEWHERE CLASSIFIED: ICD-10-CM

## 2025-02-10 PROCEDURE — 97803 MED NUTRITION INDIV SUBSEQ: CPT

## 2025-02-28 ENCOUNTER — APPOINTMENT (OUTPATIENT)
Dept: OTOLARYNGOLOGY | Facility: CLINIC | Age: 25
End: 2025-02-28
Payer: MEDICAID

## 2025-02-28 DIAGNOSIS — H73.21 UNSPECIFIED MYRINGITIS, RIGHT EAR: ICD-10-CM

## 2025-02-28 DIAGNOSIS — L92.9 GRANULOMATOUS DISORDER OF THE SKIN AND SUBCUTANEOUS TISSUE, UNSPECIFIED: ICD-10-CM

## 2025-02-28 DIAGNOSIS — H90.3 SENSORINEURAL HEARING LOSS, BILATERAL: ICD-10-CM

## 2025-02-28 PROCEDURE — 92504 EAR MICROSCOPY EXAMINATION: CPT

## 2025-02-28 PROCEDURE — 99214 OFFICE O/P EST MOD 30 MIN: CPT | Mod: 25

## 2025-02-28 RX ORDER — CLOTRIMAZOLE AND BETAMETHASONE DIPROPIONATE 10; .5 MG/G; MG/G
1-0.05 CREAM TOPICAL
Qty: 1 | Refills: 0 | Status: ACTIVE | COMMUNITY
Start: 2025-02-28 | End: 1900-01-01

## 2025-03-03 ENCOUNTER — APPOINTMENT (OUTPATIENT)
Dept: HEPATOLOGY | Facility: CLINIC | Age: 25
End: 2025-03-03

## 2025-03-03 VITALS
WEIGHT: 252 LBS | DIASTOLIC BLOOD PRESSURE: 85 MMHG | HEART RATE: 98 BPM | RESPIRATION RATE: 18 BRPM | HEIGHT: 68 IN | SYSTOLIC BLOOD PRESSURE: 125 MMHG | OXYGEN SATURATION: 94 % | BODY MASS INDEX: 38.19 KG/M2 | TEMPERATURE: 98 F

## 2025-03-03 PROCEDURE — 99214 OFFICE O/P EST MOD 30 MIN: CPT

## 2025-03-03 PROCEDURE — G2211 COMPLEX E/M VISIT ADD ON: CPT | Mod: NC

## 2025-03-05 ENCOUNTER — NON-APPOINTMENT (OUTPATIENT)
Age: 25
End: 2025-03-05

## 2025-03-05 LAB
ALBUMIN SERPL ELPH-MCNC: 4.7 G/DL
ALP BLD-CCNC: 105 U/L
ALT SERPL-CCNC: 91 U/L
AST SERPL-CCNC: 48 U/L
BILIRUB DIRECT SERPL-MCNC: 0.1 MG/DL
BILIRUB INDIRECT SERPL-MCNC: 0.2 MG/DL
BILIRUB SERPL-MCNC: 0.2 MG/DL
PROT SERPL-MCNC: 8.2 G/DL

## 2025-03-10 ENCOUNTER — APPOINTMENT (OUTPATIENT)
Dept: GASTROENTEROLOGY | Facility: CLINIC | Age: 25
End: 2025-03-10
Payer: MEDICAID

## 2025-03-10 VITALS
HEART RATE: 82 BPM | TEMPERATURE: 96.9 F | SYSTOLIC BLOOD PRESSURE: 110 MMHG | OXYGEN SATURATION: 96 % | DIASTOLIC BLOOD PRESSURE: 78 MMHG | WEIGHT: 250 LBS | RESPIRATION RATE: 16 BRPM | HEIGHT: 68 IN | BODY MASS INDEX: 37.89 KG/M2

## 2025-03-10 DIAGNOSIS — K76.0 FATTY (CHANGE OF) LIVER, NOT ELSEWHERE CLASSIFIED: ICD-10-CM

## 2025-03-10 DIAGNOSIS — E78.00 PURE HYPERCHOLESTEROLEMIA, UNSPECIFIED: ICD-10-CM

## 2025-03-10 DIAGNOSIS — E66.812 OBESITY, CLASS 2: ICD-10-CM

## 2025-03-10 DIAGNOSIS — K74.00 HEPATIC FIBROSIS, UNSPECIFIED: ICD-10-CM

## 2025-03-10 DIAGNOSIS — R79.89 OTHER SPECIFIED ABNORMAL FINDINGS OF BLOOD CHEMISTRY: ICD-10-CM

## 2025-03-10 PROCEDURE — 97803 MED NUTRITION INDIV SUBSEQ: CPT

## 2025-03-31 ENCOUNTER — RESULT REVIEW (OUTPATIENT)
Age: 25
End: 2025-03-31

## 2025-03-31 ENCOUNTER — OUTPATIENT (OUTPATIENT)
Dept: OUTPATIENT SERVICES | Facility: HOSPITAL | Age: 25
LOS: 1 days | End: 2025-03-31
Payer: SUBSIDIZED

## 2025-03-31 ENCOUNTER — APPOINTMENT (OUTPATIENT)
Dept: MRI IMAGING | Facility: HOSPITAL | Age: 25
End: 2025-03-31

## 2025-03-31 DIAGNOSIS — Z00.6 ENCOUNTER FOR EXAMINATION FOR NORMAL COMPARISON AND CONTROL IN CLINICAL RESEARCH PROGRAM: ICD-10-CM

## 2025-03-31 DIAGNOSIS — Z00.00 ENCOUNTER FOR GENERAL ADULT MEDICAL EXAMINATION WITHOUT ABNORMAL FINDINGS: ICD-10-CM

## 2025-03-31 PROCEDURE — 70551 MRI BRAIN STEM W/O DYE: CPT

## 2025-03-31 PROCEDURE — 70551 MRI BRAIN STEM W/O DYE: CPT | Mod: 26

## 2025-04-10 ENCOUNTER — APPOINTMENT (OUTPATIENT)
Dept: ENDOCRINOLOGY | Facility: CLINIC | Age: 25
End: 2025-04-10

## 2025-05-29 ENCOUNTER — APPOINTMENT (OUTPATIENT)
Dept: OTOLARYNGOLOGY | Facility: CLINIC | Age: 25
End: 2025-05-29
Payer: MEDICAID

## 2025-05-29 VITALS — HEIGHT: 68 IN | BODY MASS INDEX: 37.89 KG/M2 | WEIGHT: 250 LBS

## 2025-05-29 DIAGNOSIS — H73.21 UNSPECIFIED MYRINGITIS, RIGHT EAR: ICD-10-CM

## 2025-05-29 PROCEDURE — 92504 EAR MICROSCOPY EXAMINATION: CPT

## 2025-05-29 PROCEDURE — 99213 OFFICE O/P EST LOW 20 MIN: CPT | Mod: 25

## 2025-06-17 NOTE — ED ADULT TRIAGE NOTE - LOCATION:
Left arm;
Quality 226: Preventive Care And Screening: Tobacco Use: Screening And Cessation Intervention: Patient screened for tobacco use and is an ex/non-smoker
Quality 130: Documentation Of Current Medications In The Medical Record: Current Medications Documented
Detail Level: Detailed

## 2025-07-09 ENCOUNTER — APPOINTMENT (OUTPATIENT)
Dept: ENDOCRINOLOGY | Facility: CLINIC | Age: 25
End: 2025-07-09

## 2025-07-28 ENCOUNTER — TRANSCRIPTION ENCOUNTER (OUTPATIENT)
Age: 25
End: 2025-07-28

## 2025-07-28 ENCOUNTER — APPOINTMENT (OUTPATIENT)
Dept: HEPATOLOGY | Facility: CLINIC | Age: 25
End: 2025-07-28

## 2025-07-28 VITALS
SYSTOLIC BLOOD PRESSURE: 122 MMHG | DIASTOLIC BLOOD PRESSURE: 80 MMHG | RESPIRATION RATE: 17 BRPM | HEIGHT: 68 IN | BODY MASS INDEX: 39.47 KG/M2 | TEMPERATURE: 97.1 F | HEART RATE: 100 BPM | OXYGEN SATURATION: 97 % | WEIGHT: 260.4 LBS

## 2025-07-29 ENCOUNTER — NON-APPOINTMENT (OUTPATIENT)
Age: 25
End: 2025-07-29

## 2025-07-29 LAB
ALBUMIN SERPL ELPH-MCNC: 4.5 G/DL
ALP BLD-CCNC: 104 U/L
ALT SERPL-CCNC: 105 U/L
AST SERPL-CCNC: 53 U/L
BILIRUB DIRECT SERPL-MCNC: 0.12 MG/DL
BILIRUB INDIRECT SERPL-MCNC: 0.2 MG/DL
BILIRUB SERPL-MCNC: 0.3 MG/DL
PROT SERPL-MCNC: 7.9 G/DL

## 2025-07-31 LAB
PETH 16:0/18:1: NEGATIVE NG/ML
PETH 16:0/18:2: NEGATIVE NG/ML
PETH COMMENTS: NORMAL

## 2025-08-11 ENCOUNTER — APPOINTMENT (OUTPATIENT)
Dept: GASTROENTEROLOGY | Facility: CLINIC | Age: 25
End: 2025-08-11
Payer: MEDICAID

## 2025-08-11 VITALS
HEART RATE: 105 BPM | DIASTOLIC BLOOD PRESSURE: 88 MMHG | HEIGHT: 68 IN | WEIGHT: 258 LBS | BODY MASS INDEX: 39.1 KG/M2 | SYSTOLIC BLOOD PRESSURE: 138 MMHG | OXYGEN SATURATION: 97 % | TEMPERATURE: 96.7 F

## 2025-08-11 DIAGNOSIS — R79.89 OTHER SPECIFIED ABNORMAL FINDINGS OF BLOOD CHEMISTRY: ICD-10-CM

## 2025-08-11 DIAGNOSIS — E66.812 OBESITY, CLASS 2: ICD-10-CM

## 2025-08-11 DIAGNOSIS — K76.0 FATTY (CHANGE OF) LIVER, NOT ELSEWHERE CLASSIFIED: ICD-10-CM

## 2025-08-11 PROCEDURE — 97803 MED NUTRITION INDIV SUBSEQ: CPT

## 2025-08-20 ENCOUNTER — APPOINTMENT (OUTPATIENT)
Dept: GASTROENTEROLOGY | Facility: CLINIC | Age: 25
End: 2025-08-20
Payer: MEDICAID

## 2025-08-20 VITALS
BODY MASS INDEX: 39.25 KG/M2 | RESPIRATION RATE: 16 BRPM | OXYGEN SATURATION: 98 % | TEMPERATURE: 97.4 F | SYSTOLIC BLOOD PRESSURE: 125 MMHG | HEIGHT: 68 IN | WEIGHT: 259 LBS | DIASTOLIC BLOOD PRESSURE: 75 MMHG | HEART RATE: 91 BPM

## 2025-08-20 PROCEDURE — 99214 OFFICE O/P EST MOD 30 MIN: CPT

## 2025-09-08 ENCOUNTER — APPOINTMENT (OUTPATIENT)
Dept: GASTROENTEROLOGY | Facility: CLINIC | Age: 25
End: 2025-09-08
Payer: MEDICAID

## 2025-09-08 VITALS
DIASTOLIC BLOOD PRESSURE: 80 MMHG | TEMPERATURE: 96.7 F | SYSTOLIC BLOOD PRESSURE: 133 MMHG | HEIGHT: 68 IN | BODY MASS INDEX: 39.4 KG/M2 | OXYGEN SATURATION: 97 % | HEART RATE: 90 BPM | WEIGHT: 260 LBS

## 2025-09-08 DIAGNOSIS — K76.0 FATTY (CHANGE OF) LIVER, NOT ELSEWHERE CLASSIFIED: ICD-10-CM

## 2025-09-08 DIAGNOSIS — E78.00 PURE HYPERCHOLESTEROLEMIA, UNSPECIFIED: ICD-10-CM

## 2025-09-08 DIAGNOSIS — E66.812 OBESITY, CLASS 2: ICD-10-CM

## 2025-09-08 DIAGNOSIS — R79.89 OTHER SPECIFIED ABNORMAL FINDINGS OF BLOOD CHEMISTRY: ICD-10-CM

## 2025-09-08 PROCEDURE — 97803 MED NUTRITION INDIV SUBSEQ: CPT

## 2025-09-16 ENCOUNTER — APPOINTMENT (OUTPATIENT)
Dept: OTOLARYNGOLOGY | Facility: CLINIC | Age: 25
End: 2025-09-16
Payer: MEDICAID

## 2025-09-16 VITALS — BODY MASS INDEX: 39.4 KG/M2 | WEIGHT: 260 LBS | HEIGHT: 68 IN

## 2025-09-16 DIAGNOSIS — L92.9 GRANULOMATOUS DISORDER OF THE SKIN AND SUBCUTANEOUS TISSUE, UNSPECIFIED: ICD-10-CM

## 2025-09-16 DIAGNOSIS — H73.21 UNSPECIFIED MYRINGITIS, RIGHT EAR: ICD-10-CM

## 2025-09-16 DIAGNOSIS — H90.3 SENSORINEURAL HEARING LOSS, BILATERAL: ICD-10-CM

## 2025-09-16 DIAGNOSIS — H60.501 UNSPECIFIED ACUTE NONINFECTIVE OTITIS EXTERNA, RIGHT EAR: ICD-10-CM

## 2025-09-16 PROCEDURE — 99213 OFFICE O/P EST LOW 20 MIN: CPT | Mod: 25

## 2025-09-16 PROCEDURE — 92504 EAR MICROSCOPY EXAMINATION: CPT

## 2025-09-25 LAB — EAR NOSE AND THROAT CULTURE: ABNORMAL
